# Patient Record
Sex: FEMALE | Race: WHITE | NOT HISPANIC OR LATINO | Employment: FULL TIME | URBAN - METROPOLITAN AREA
[De-identification: names, ages, dates, MRNs, and addresses within clinical notes are randomized per-mention and may not be internally consistent; named-entity substitution may affect disease eponyms.]

---

## 2019-03-15 PROCEDURE — 86481 TB AG RESPONSE T-CELL SUSP: CPT

## 2019-03-16 ENCOUNTER — LAB REQUISITION (OUTPATIENT)
Dept: LAB | Facility: HOSPITAL | Age: 54
End: 2019-03-16

## 2019-03-16 DIAGNOSIS — Z00.00 ROUTINE GENERAL MEDICAL EXAMINATION AT A HEALTH CARE FACILITY: ICD-10-CM

## 2019-03-17 LAB
TSPOT INTERPRETATION: NEGATIVE
TSPOT NIL CONTROL INTERPRETATION: NORMAL
TSPOT PANEL A: 0
TSPOT PANEL B: 0
TSPOT POS CONTROL INTERPRETATION: NORMAL

## 2019-05-08 ENCOUNTER — OFFICE VISIT (OUTPATIENT)
Dept: ORTHOPEDIC SURGERY | Facility: CLINIC | Age: 54
End: 2019-05-08

## 2019-05-08 VITALS — HEIGHT: 70 IN | BODY MASS INDEX: 41.95 KG/M2 | OXYGEN SATURATION: 98 % | WEIGHT: 293 LBS | HEART RATE: 121 BPM

## 2019-05-08 DIAGNOSIS — Z02.6 ENCOUNTER RELATED TO WORKER'S COMPENSATION CLAIM: ICD-10-CM

## 2019-05-08 DIAGNOSIS — S83.91XA SPRAIN OF RIGHT KNEE, UNSPECIFIED LIGAMENT, INITIAL ENCOUNTER: ICD-10-CM

## 2019-05-08 DIAGNOSIS — M25.531 RIGHT WRIST PAIN: Primary | ICD-10-CM

## 2019-05-08 PROCEDURE — 99204 OFFICE O/P NEW MOD 45 MIN: CPT | Performed by: ORTHOPAEDIC SURGERY

## 2019-05-08 NOTE — PROGRESS NOTES
[facility] – [place], [place] Orthopaedic Surgery Clinic Note    Subjective     Chief Complaint   Patient presents with   • Right Wrist - Pain        HPI      Estefani Blancas is a 53 y.o. female.  She fell at work injuring her right knee and right wrist about 4 weeks ago.  She had x-rays .  She is been in a brace.  Pain is 4 out of 10 aching and she is slowly getting better.        Past Medical History:   Diagnosis Date   • Diabetes mellitus (CMS/HCC)    • Hyperlipidemia    • Hypertension       Past Surgical History:   Procedure Laterality Date   • ADENOIDECTOMY     •  SECTION     • TONSILLECTOMY        History reviewed. No pertinent family history.  Social History     Socioeconomic History   • Marital status:      Spouse name: Not on file   • Number of children: Not on file   • Years of education: Not on file   • Highest education level: Not on file   Tobacco Use   • Smoking status: Never Smoker   • Smokeless tobacco: Never Used   Substance and Sexual Activity   • Alcohol use: No     Frequency: Never   • Drug use: Defer   • Sexual activity: Defer      Current Outpatient Medications on File Prior to Visit   Medication Sig Dispense Refill   • ACETAMINOPHEN 8 HOUR PO Take  by mouth.     • AMLODIPINE BESYLATE PO Take  by mouth.     • Empagliflozin (JARDIANCE PO) Take  by mouth.     • GLIPIZIDE PO Take  by mouth.     • HYDROXYZINE HCL PO Take  by mouth.     • LOSARTAN POTASSIUM PO Take  by mouth.     • Metoprolol-Hydrochlorothiazide (METOPROLOL-HCTZ ER PO) Take  by mouth.     • Misc Natural Products (ESTROVEN ENERGY) tablet Take  by mouth.     • naproxen (NAPROSYN) 375 MG tablet Take 1 tablet by mouth 2 (Two) Times a Day With Meals. 20 tablet 0   • PARoxetine HCl (PAXIL PO) Take  by mouth.       No current facility-administered medications on file prior to visit.       Allergies   Allergen Reactions   • Latex Hives        The following portions of the patient's history were reviewed and updated as appropriate:  "allergies, current medications, past family history, past medical history, past social history, past surgical history and problem list.    Review of Systems   Constitutional: Negative.    HENT: Positive for sinus pressure and sneezing.    Eyes: Negative.    Respiratory: Positive for apnea.    Cardiovascular: Negative.    Gastrointestinal: Negative.    Endocrine: Negative.    Genitourinary: Negative.    Musculoskeletal: Positive for arthralgias and joint swelling.   Skin: Negative.    Allergic/Immunologic: Positive for environmental allergies.   Neurological: Negative.    Hematological: Negative.    Psychiatric/Behavioral: Negative.         Objective      Physical Exam  Pulse (!) 121   Ht 177 cm (69.69\")   Wt (!) 148 kg (326 lb 8 oz)   SpO2 98%   Breastfeeding? No   BMI 47.27 kg/m²     Body mass index is 47.27 kg/m².        GENERAL APPEARANCE: awake, alert & oriented x 3, in no acute distress and well developed, well nourished  PSYCH: normal mood and affect  LUNGS:  breathing nonlabored, no wheezing  EYES: sclera anicteric, pupils equal  CARDIOVASCULAR: palpable pulses dorsalis pedis, palpable posterior tibial bilaterally. Capillary refill less than 2 seconds  INTEGUMENTARY: skin intact, no clubbing, cyanosis  NEUROLOGIC:  Normal Sensation and reflexes             Ortho Exam  Peripheral Vascular   Left Upper Extremity    No cyanotic nail beds    Pink nail beds and rapid capillary refill   Palpation    Radial Pulse - Bilaterally normal    Neurologic   Sensory: Light touch intact- Right and left hand    Left Upper Extremity    Left wrist extensors: 5/5    Left wrist flexors: 5/5    Left intrinsics: 5/5   Right Upper Extremity    Right wrist extensors: 5/5    Right wrist flexors: 5/5    Right intrinsics: 5/5    Musculoskeletal   Left Elbow    Forearm supination: AROM - 90 degrees    Forearm pronation: AROM - 90 degrees   Right Elbow    Forearm supination: AROM - 90 degrees    Forearm pronation: AROM - 90 " degrees     Inspection and Palpation   Right Wrist      Tenderness -tender snuffbox right wrist    Swelling - none    Crepitus - none    Muscle tone - no atrophy   Left Wrist    Tenderness - none    Swelling - none    Crepitus - none    Muscle tone - no atrophy     ROJM:   Left Wrist    Flexion: AROM - 90 degrees    Extension: AROM - 90 degrees   Right Wrist    Flexion: AROM - 90 degrees    Extension: AROM - 90 degrees     Deformities, Malalignments, Discrepancies    None     Functional Testing   Right Wrist    Tinel's Sign negative    Phalen's Sign negative    Carpal Compression Test negative   Left Wrist    Tinel's Sign negative    Phalen's Sign negative    Carpal Compression Test negative       Strength and Tone    Right  strength: good    Left  strength: good      Peripheral Vascular:    Upper Extremity:   Inspection:  Left--no cyanotic nail beds Right--no cyanotic nail beds   Bilateral:  Pink nail beds with brisk capillary refill   Palpation:  Bilateral radial pulse normal  Musculoskeletal:  Global Assessment:  Overall assessment of Lower Extremity Muscle Strength and Tone:  Right quadriceps--5/5  Right hamstrings--5/5  Right tibialis anterior--5/5  Right gastroc soleus--5/5  Right EHL--5/5  Lower Extremity:  Knee/Patella:  No digital clubbing or cyanosis.    Examination of right knee reveals:  Normal deep tendon reflexes, coordination, strength, tone, sensation.  No known fractures or deformities.  Inspection and Palpation:    Right knee:  Tenderness: Tibial tubercle  Effusion:  none  Crepitus:  none  Pulses:  2+  Ecchymosis:  None  Warmth:  None   ROM:  Right:  Extension:0    Flexion:135  Left:  Extension:0     Flexion:135  Instability:  Right:  Lachman Test:  Negative, Varus stress test negative,   Valgus stress test negative, Posterior Drawer Test:  Negative  Deformities/Malalignments/Discrepancies:    Left:  none  Right:  none  Functional Testing:  Right:  Briana's test:  Negative  Patella grind  test:  Negative  Q-angle:  Normal  Apprehension Sign:  Negative        Imaging/Studies  Imaging Results (last 7 days)     ** No results found for the last 168 hours. **        I viewed her x-rays from April 19 which showed questionable lucency scaphoid and her knee x-rays show the metal screws with no acute abnormality  Assessment/Plan        ICD-10-CM ICD-9-CM   1. Right wrist pain M25.531 719.43   2. Sprain of right knee, unspecified ligament, initial encounter S83.91XA 844.9   3. Encounter related to worker's compensation claim [email or web address] V70.3       Orders Placed This Encounter   Procedures   • MRI Wrist Right Without Contrast   • Ambulatory Referral to Physical Therapy      She will start physical therapy for the knee.  I have ordered an MRI of the right wrist.  She will follow-up after the MRI.  She will continue thumb spica brace in the meantime for the right wrist for possible scaphoid fracture.  Her work restrictions are seated job only with no use right hand.  Medical Decision Making  Management Options : over-the-counter medicine and close treatment of fracture or dislocation  Data/Risk: radiology tests and independent visualization of imaging, lab tests, or EMG/NCV    Bryon Mckeon MD  05/08/19  2:30 PM         EMR Dragon/Transcription disclaimer:  Much of this encounter note is an electronic transcription of spoken language to printed text. Electronic transcription of spoken language may permit erroneous, or at times, nonsensical words or phrases to be inadvertently transcribed. Although I have reviewed the note for such errors, some may still exist.

## 2019-05-16 ENCOUNTER — HOSPITAL ENCOUNTER (OUTPATIENT)
Dept: PHYSICAL THERAPY | Facility: HOSPITAL | Age: 54
Setting detail: THERAPIES SERIES
Discharge: HOME | End: 2019-05-16

## 2019-05-16 DIAGNOSIS — M25.561 ACUTE PAIN OF RIGHT KNEE: Primary | ICD-10-CM

## 2019-05-16 PROCEDURE — 97161 PT EVAL LOW COMPLEX 20 MIN: CPT | Performed by: PHYSICAL THERAPIST

## 2019-05-16 NOTE — THERAPY EVALUATION
Outpatient Physical Therapy Ortho Initial Evaluation  [facility]     Patient Name: Estefani Blancas  : 1965  MRN: [id number]  Today's Date: 2019      Visit Date: 2019    There is no problem list on file for this patient.       Past Medical History:   Diagnosis Date   • Diabetes mellitus (CMS/[facility])    • Hyperlipidemia    • Hypertension         Past Surgical History:   Procedure Laterality Date   • ADENOIDECTOMY     •  SECTION     • TONSILLECTOMY         Visit Dx:     ICD-10-CM ICD-9-CM   1. Acute pain of right knee M25.561 719.46         Patient History     Row Name 19 0800             History    Chief Complaint  Difficulty Walking;Difficulty with daily activities;Joint swelling;Pain  -RB      Type of Pain  Knee pain  -RB      Date Current Problem(s) Began  19  -RB      Brief Description of Current Complaint  , fell while working. 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 Landed on R knee. Developed swelling, anterior and medial knee pn. XR unremarkable. Continues to have burning pn, swelling w/ prolonged activity. Denies popping, clicking, instability. Able to tolerate about 30 minutes of standing before needing a rest break.  -RB      Previous treatment for THIS PROBLEM  -- none  -RB      Patient/Caregiver Goals  Relieve pain;Return to prior level of function  -RB      Current Tobacco Use  None  -RB      Patient's Rating of General Health  Very good  -RB      Occupation/sports/leisure activities   at [employer]  -RB      How has patient tried to help current problem?  rest, patellar stabilizing brace  -RB      What clinical tests have you had for this problem?  X-ray  -RB      Results of Clinical Tests  mod degenerative changes, no acute injury  -RB         Pain     Pain Location  Knee  -RB      Pain at Present  3  -RB      Pain at Best  0  -RB      Pain at Worst  6  -RB      Pain  Frequency  Intermittent  -RB      Pain Description  Burning  -RB      What Performance Factors Make the Current Problem(s) WORSE?  standing, walking, kneeling, pressure against the knee.  -RB      Tolerance Time- Standing  30 min  -RB      Tolerance Time- Walking  30 min  -RB      Is your sleep disturbed?  No  -RB         Fall Risk Assessment    Any falls in the past year:  Yes  -RB      Number of falls reported in the last 12 months  1  -RB      Factors that contributed to the fall:  Tripped  -RB      Does patient have a fear of falling  No  -RB         Daily Activities    Primary Language  English  -RB      Are you able to read  Yes  -RB      Are you able to write  Yes  -RB      How does patient learn best?  Listening;Demonstration  -RB      Teaching needs identified  Home Exercise Program;Management of Condition  -RB      Patient is concerned about/has problems with  Climbing Stairs;Performing home management (household chores, shopping, care of dependents);Performing job responsibilities/community activities (work, school,;Standing;Transfers (getting out of a chair, bed);Walking  -RB      Does patient have problems with the following?  Anxiety;Panic Attack  -RB      Barriers to learning  None  -RB      Pt Participated in POC and Goals  Yes  -RB         Safety    Are you being hurt, hit, or frightened by anyone at home or in your life?  No  -RB      Are you being neglected by a caregiver  No  -RB        User Key  (r) = Recorded By, (t) = Taken By, (c) = Cosigned By    Initials Name Provider Type    RB Ana Rosa Lennon PT Physical Therapist          PT Ortho     Row Name 05/16/19 0700       Posture/Observations    Posture/Observations Comments  Presents ambulating independently w/ patellar stabilizing brace donned. Demo mild antalgic gait. Noted to have infrapatellar edema. Pt withdraws w/ superficial palpation of infrapatellar region and medial joint line  -RB       Special Tests/Palpation    Special  Tests/Palpation  Knee  -RB       Knee Palpation    Knee Palpation?  Yes  -RB    Patella  Right:;Tender  -RB    Patella Tendon  Right:;Tender  -RB    Medial Joint Line  Right:;Tender  -RB       Knee Special Tests    Anterior drawer (ACL lesion)  Negative  -RB    Posterior drawer (PCL lesion)  Negative  -RB    Valgus stress (MCL lesion)  Right: significant pn medial joint line  -RB    Varus stress (LCL lesion)  Negative  -RB    Thessaly test (meniscal lesion)  Negative  -RB       General ROM    RT Lower Ext  Rt Knee Extension/Flexion  -RB    LT Lower Ext  Lt Knee Extension/Flexion  -RB       Right Lower Ext    Rt Knee Extension/Flexion AROM  0-86 deg pn both end ranges  -RB       Left Lower Ext    Lt Knee Extension/Flexion AROM  0-125  -RB       MMT (Manual Muscle Testing)    Rt Lower Ext  Rt Hip Flexion;Rt Hip Extension;Rt Hip ABduction;Rt Hip External (Lateral) Rotation;Rt Knee Extension;Rt Knee Flexion  -RB    Lt Lower Ext  Lt Hip Flexion;Lt Hip Extension;Lt Hip ABduction;Lt Hip External (Lateral) Rotation;Lt Knee Extension;Lt Knee Flexion  -RB       MMT Right Lower Ext    Rt Hip Flexion MMT, Gross Movement  (5/5) normal  -RB    Rt Hip Extension MMT, Gross Movement  (4/5) good unable to tolerate prone, testing in supine  -RB    Rt Hip ABduction MMT, Gross Movement  (4/5) good  -RB    Rt Hip External (Lateral) Rotation MMT, Gross Movement  (4+/5) good plus  -RB    Rt Knee Extension MMT, Gross Movement  (5/5) normal  -RB    Rt Knee Flexion MMT, Gross Movement  (5/5) normal  -RB       MMT Left Lower Ext    Lt Hip Flexion MMT, Gross Movement  (5/5) normal  -RB    Lt Hip Extension MMT, Gross Movement  (4+/5) good plus  -RB    Lt Hip ABduction MMT, Gross Movement  (4+/5) good plus  -RB    Lt Hip External (Lateral) Rotation MMT, Gross Movement  (5/5) normal  -RB    Lt Knee Extension MMT, Gross Movement  (5/5) normal  -RB    Lt Knee Flexion MMT, Gross Movement  (5/5) normal  -RB      User Key  (r) = Recorded By, (t) =  Taken By, (c) = Cosigned By    Initials Name Provider Type    RB Ana Rosa Lennon PT Physical Therapist                      Therapy Education  Education Details: issued initial HEP including QS, SAQ, SLR, and seated heel slides. Educated regarding use of ice for pn/edema control  Given: HEP  Program: New  How Provided: Verbal, Demonstration, Written  Provided to: Patient  Level of Understanding: Teach back education performed     PT OP Goals     Row Name 05/16/19 0945 05/16/19 0800       PT Short Term Goals    STG Date to Achieve  --  06/06/19  -RB    STG 1  --  Pt to be compliant w/ initial HEP and pn/edema mgmt  -RB    STG 1 Progress  --  New  -RB    STG 2  --  Pt to report at least a 30% improvement in pn.  -RB    STG 2 Progress  --  New  -RB    STG 3  --  Pt to improve R knee flxn ROM to 100 deg  -RB    STG 3 Progress  --  New  -RB       Long Term Goals    LTG Date to Achieve  --  06/27/19  -RB    LTG 1  --  Pt to be independent w/ long term HEP and self mgmt  -RB    LTG 1 Progress  --  New  -RB    LTG 2  --  Pt to report at least a 60% improvement in pn.  -RB    LTG 2 Progress  --  New  -RB    LTG 3  --  Pt to improve R knee flxn ROM to 115 deg  -RB    LTG 3 Progress  --  New  -RB    LTG 4  --  Pt to improve LEFS score to 30/80 or better to reflect improved pn and function.  -RB    LTG 4 Progress  --  New  -RB       Time Calculation    PT Goal Re-Cert Due Date  08/14/19  -RB  08/14/19  -RB      User Key  (r) = Recorded By, (t) = Taken By, (c) = Cosigned By    Initials Name Provider Type    Ana Rosa Hdz PT Physical Therapist          PT Assessment/Plan     Row Name 05/16/19 0941          PT Assessment    Functional Limitations  Impaired gait;Impaired locomotion;Limitation in home management;Limitations in community activities;Limitations in functional capacity and performance;Performance in leisure activities;Performance in self-care ADL;Performance in work activities  -RB     Impairments  Muscle  strength;Range of motion;Pain;Edema  -RB     Assessment Comments  Pt presents w/ complaint of knee of R knee after fall at work w/ direct impact to anterior knee. XR unremarkable and knee stable to ligament testing, though medial knee pn reproduced upon valgus testing. Pt also noted to have infrapatellar edema possibly indicating bursitis due to impact. She demonstrates impairments in knee strength and mobility, which limits her ability to tolerate standing, walking, and performing daily and work activities. She would benefit from skilled PT services to address deficits and maximize function.  -RB     Please refer to paper survey for additional self-reported information  Yes  -RB     Rehab Potential  Good  -RB     Patient/caregiver participated in establishment of treatment plan and goals  Yes  -RB     Patient would benefit from skilled therapy intervention  Yes  -RB        PT Plan    PT Frequency  1x/week;2x/week  -RB     Predicted Duration of Therapy Intervention (Therapy Eval)  10-12 visits  -RB     Planned CPT's?  PT EVAL LOW COMPLEXITY: 29338;PT RE-EVAL: 97208;PT THER PROC EA 15 MIN: 03822;PT MANUAL THERAPY EA 15 MIN: 87045;PT NEUROMUSC RE-EDUCATION EA 15 MIN: 66321;PT GAIT TRAINING EA 15 MIN: 45992;PT ELECTRICAL STIM UNATTEND: ;PT ULTRASOUND EA 15 MIN: 13439;PT HOT/COLD PACK WC NONMCARE: 58380  -RB     PT Plan Comments  PT POC to include progressive hip/knee strengthening, ROM activities, manual therapy techniques, modalities as indicated for pn/edema control  -RB       User Key  (r) = Recorded By, (t) = Taken By, (c) = Cosigned By    Initials Name Provider Type    Ana Rosa Hdz PT Physical Therapist                              Outcome Measure Options: Lower Extremity Functional Scale (LEFS)  Lower Extremity Functional Index  Any of your usual work, housework or school activities: Moderate difficulty  Your usual hobbies, recreational or sporting activities: Extreme difficulty or unable to perform  activity  Getting into or out of the bath: Moderate difficulty  Walking between rooms: Moderate difficulty  Putting on your shoes or socks: A little bit of difficulty  Squatting: Extreme difficulty or unable to perform activity  Lifting an object, like a bag of groceries from the floor: Moderate difficulty  Performing light activities around your home: Moderate difficulty  Performing heavy activities around your home: Extreme difficulty or unable to perform activity  Getting into or out of a car: Quite a bit of difficulty  Walking 2 blocks: Extreme difficulty or unable to perform activity  Walking a mile: Extreme difficulty or unable to perform activity  Going up or down 10 stairs (about 1 flight of stairs): Extreme difficulty or unable to perform activity  Standing for 1 hour: Quite a bit of difficulty  Sitting for 1 hour: Quite a bit of difficulty  Running on even ground: Extreme difficulty or unable to perform activity  Running on uneven ground: Extreme difficulty or unable to perform activity  Making sharp turns while running fast: Extreme difficulty or unable to perform activity  Hopping: Extreme difficulty or unable to perform activity  Rolling over in bed: Moderate difficulty  Total: 18      Time Calculation:     Start Time: 0800     Therapy Charges for Today     Code Description Service Date Service Provider Modifiers Qty    34920530800 HC PT EVAL LOW COMPLEXITY 3 5/16/2019 Ana Rosa Lennon, PT GP 1          PT G-Codes  Outcome Measure Options: Lower Extremity Functional Scale (LEFS)  Total: 18         Ana Rosa Lennon, PT  5/16/2019

## 2019-05-17 ENCOUNTER — HOSPITAL ENCOUNTER (OUTPATIENT)
Dept: MRI IMAGING | Facility: HOSPITAL | Age: 54
Discharge: HOME | End: 2019-05-17
Admitting: ORTHOPAEDIC SURGERY

## 2019-05-17 DIAGNOSIS — M25.531 RIGHT WRIST PAIN: ICD-10-CM

## 2019-05-17 PROCEDURE — 73221 MRI JOINT UPR EXTREM W/O DYE: CPT

## 2019-05-21 ENCOUNTER — APPOINTMENT (OUTPATIENT)
Dept: PHYSICAL THERAPY | Facility: HOSPITAL | Age: 54
End: 2019-05-21

## 2019-05-23 ENCOUNTER — HOSPITAL ENCOUNTER (OUTPATIENT)
Dept: PHYSICAL THERAPY | Facility: HOSPITAL | Age: 54
Setting detail: THERAPIES SERIES
Discharge: HOME | End: 2019-05-23

## 2019-05-23 DIAGNOSIS — M25.561 ACUTE PAIN OF RIGHT KNEE: Primary | ICD-10-CM

## 2019-05-23 PROCEDURE — 97110 THERAPEUTIC EXERCISES: CPT | Performed by: PHYSICAL THERAPIST

## 2019-05-23 NOTE — THERAPY TREATMENT NOTE
Outpatient Physical Therapy Ortho Treatment Note  [facility]     Patient Name: Estefani Blancas  : 1965  MRN: [id number]  Today's Date: 2019      Visit Date: 2019    Visit Dx:    ICD-10-CM ICD-9-CM   1. Acute pain of right knee M25.561 719.46       There is no problem list on file for this patient.       Past Medical History:   Diagnosis Date   • Diabetes mellitus (CMS/HCC)    • Hyperlipidemia    • Hypertension         Past Surgical History:   Procedure Laterality Date   • ADENOIDECTOMY     •  SECTION     • TONSILLECTOMY         PT Ortho     Row Name 19 1100       Subjective Comments    Subjective Comments  Pt. went to field day with grandchildren yesterday.  She was on her feet more than usual.  This morning, her knee pain is mild.  -GEORGE       Subjective Pain    Able to rate subjective pain?  yes  -GEORGE    Pre-Treatment Pain Level  2  -GEORGE    Post-Treatment Pain Level  2  -GEORGE       Right Lower Ext    Rt Knee Extension/Flexion AROM  0-124  -GEORGE      User Key  (r) = Recorded By, (t) = Taken By, (c) = Cosigned By    Initials Name Provider Type    Nicole Li PT Physical Therapist                      PT Assessment/Plan     Row Name 19 1100          PT Assessment    Assessment Comments  Knee ROM is significantly improved since initial visit.  Knee remains tender and swollen, but function is improving and pain is decreasing.  -GEORGE        PT Plan    PT Plan Comments  Continue and progress as tolerated.  Pt. will see MD tomorrow.  -GEORGE       User Key  (r) = Recorded By, (t) = Taken By, (c) = Cosigned By    Initials Name Provider Type    Nicole Li PT Physical Therapist            Exercises     Row Name 19 1100             Subjective Comments    Subjective Comments  Pt. went to field day with grandchildren yesterday.  She was on her feet more than usual.  This morning, her knee pain is mild.  -GEORGE         Subjective Pain    Able to rate subjective pain?   yes  -GEORGE      Pre-Treatment Pain Level  2  -GEORGE      Post-Treatment Pain Level  2  -GEORGE         Total Minutes    44672 - PT Therapeutic Exercise Minutes  30  -GEORGE         Exercise 1    Exercise Name 1  Inititated exercise in clinical setting per flow sheet, including open and closed chain strengthening exercises and introduction of proprioceptive activities on soft surface.  -GEORGE        User Key  (r) = Recorded By, (t) = Taken By, (c) = Cosigned By    Initials Name Provider Type    Nicole Li, PT Physical Therapist                                          Time Calculation:   Start Time: 0915  Total Timed Code Minutes- PT: 30 minute(s)  Therapy Charges for Today     Code Description Service Date Service Provider Modifiers Qty    [id number]  PT THER PROC EA 15 MIN 5/23/2019 Nicole Baca, PT GP 2                    Nicole Baca, RACHEL  5/23/2019

## 2019-05-24 ENCOUNTER — OFFICE VISIT (OUTPATIENT)
Dept: ORTHOPEDIC SURGERY | Facility: CLINIC | Age: 54
End: 2019-05-24

## 2019-05-24 VITALS — OXYGEN SATURATION: 99 % | HEIGHT: 70 IN | WEIGHT: 293 LBS | BODY MASS INDEX: 41.95 KG/M2 | HEART RATE: 98 BPM

## 2019-05-24 DIAGNOSIS — Z02.6 ENCOUNTER RELATED TO WORKER'S COMPENSATION CLAIM: ICD-10-CM

## 2019-05-24 DIAGNOSIS — M25.531 RIGHT WRIST PAIN: Primary | ICD-10-CM

## 2019-05-24 DIAGNOSIS — S83.91XA SPRAIN OF RIGHT KNEE, UNSPECIFIED LIGAMENT, INITIAL ENCOUNTER: ICD-10-CM

## 2019-05-24 PROCEDURE — 99213 OFFICE O/P EST LOW 20 MIN: CPT | Performed by: ORTHOPAEDIC SURGERY

## 2019-05-24 NOTE — PROGRESS NOTES
[facility]    Subjective     Chief Complaint   Patient presents with   • Follow-up     Post MRI -Right wrist pain        HPI      Estefani Blancas is a 53 y.o. female.  She is follow-up after the MRI of the right wrist on May 17.  She is doing better in her knee and wrist.  She goes to physical therapy        Past Medical History:   Diagnosis Date   • Diabetes mellitus (CMS/HCC)    • Hyperlipidemia    • Hypertension       Past Surgical History:   Procedure Laterality Date   • ADENOIDECTOMY     •  SECTION     • TONSILLECTOMY        History reviewed. No pertinent family history.  Social History     Socioeconomic History   • Marital status:      Spouse name: Not on file   • Number of children: Not on file   • Years of education: Not on file   • Highest education level: Not on file   Tobacco Use   • Smoking status: Never Smoker   • Smokeless tobacco: Never Used   Substance and Sexual Activity   • Alcohol use: No     Frequency: Never   • Drug use: Defer   • Sexual activity: Defer      Current Outpatient Medications on File Prior to Visit   Medication Sig Dispense Refill   • ACETAMINOPHEN 8 HOUR PO Take  by mouth.     • AMLODIPINE BESYLATE PO Take  by mouth.     • Empagliflozin (JARDIANCE PO) Take  by mouth.     • GLIPIZIDE PO Take  by mouth.     • HYDROXYZINE HCL PO Take  by mouth.     • LOSARTAN POTASSIUM PO Take  by mouth.     • Metoprolol-Hydrochlorothiazide (METOPROLOL-HCTZ ER PO) Take  by mouth.     • Misc Natural Products (ESTROVEN ENERGY) tablet Take  by mouth.     • naproxen (NAPROSYN) 375 MG tablet Take 1 tablet by mouth 2 (Two) Times a Day With Meals. 20 tablet 0   • PARoxetine HCl (PAXIL PO) Take  by mouth.       No current facility-administered medications on file prior to visit.       Allergies   Allergen Reactions   • Latex Hives        The following portions of the patient's history were reviewed and updated as appropriate: allergies, current medications, past  "family history, past medical history, past social history, past surgical history and problem list.    Review of Systems   Constitutional: Negative.    HENT: Positive for sinus pressure and sneezing.    Eyes: Negative.    Respiratory: Positive for cough.    Cardiovascular: Negative.    Gastrointestinal: Negative.    Endocrine: Negative.    Genitourinary: Negative.    Musculoskeletal: Positive for joint swelling.   Skin: Negative.    Allergic/Immunologic: Negative.    Neurological: Negative.    Hematological: Negative.    Psychiatric/Behavioral: Negative.         Objective      Physical Exam  Pulse 98   Ht 177 cm (69.69\")   Wt (!) 148 kg (326 lb 4.5 oz)   SpO2 99%   Breastfeeding? No   BMI 47.24 kg/m²     Body mass index is 47.24 kg/m².        GENERAL APPEARANCE: awake, alert & oriented x 3, in no acute distress and well developed, well nourished  PSYCH: normal mood and affect      Ortho Exam  Peripheral Vascular   Left Upper Extremity    No cyanotic nail beds    Pink nail beds and rapid capillary refill   Palpation    Radial Pulse - Bilaterally normal    Neurologic   Sensory: Light touch intact- Right and left hand    Left Upper Extremity    Left wrist extensors: 5/5    Left wrist flexors: 5/5    Left intrinsics: 5/5   Right Upper Extremity    Right wrist extensors: 5/5    Right wrist flexors: 5/5    Right intrinsics: 5/5    Musculoskeletal   Left Elbow    Forearm supination: AROM - 90 degrees    Forearm pronation: AROM - 90 degrees   Right Elbow    Forearm supination: AROM - 90 degrees    Forearm pronation: AROM - 90 degrees     Inspection and Palpation   Right Wrist      Tenderness - none    Swelling - none    Crepitus - none    Muscle tone - no atrophy   Left Wrist    Tenderness - none    Swelling - none    Crepitus - none    Muscle tone - no atrophy     ROJM:   Left Wrist    Flexion: AROM - 90 degrees    Extension: AROM - 90 degrees   Right Wrist    Flexion: AROM - 90 degrees    Extension: AROM - 90 " degrees     Deformities, Malalignments, Discrepancies    None     Functional Testing   Right Wrist    Tinel's Sign negative    Phalen's Sign negative    Carpal Compression Test negative   Left Wrist    Tinel's Sign negative    Phalen's Sign negative    Carpal Compression Test negative       Strength and Tone    Right  strength: good    Left  strength: good          Imaging/Studies  Imaging Results (last 7 days)     ** No results found for the last 168 hours. **      I viewed her MRI from May 17 which showed no acute fracture or tendon or ligament injury.    Assessment/Plan        ICD-10-CM ICD-9-CM   1. Right wrist pain M25.531 719.43   2. Encounter related to worker's compensation claim Z02.6 V70.3   3. Sprain of right knee, unspecified ligament, initial encounter S83.91XA 844.9       Orders Placed This Encounter   Procedures   • Ambulatory Referral to Physical Therapy      To continue physical therapy.  She will follow-up in 2 weeks.  Her restrictions are seated job only no use right hand.  She may discontinue the brace.  She is here with the .  I reviewed her notes from physical therapy dated yesterday which state her range of motion 0- 124 degrees and she is making good improvement with her right knee.   Medical Decision Making  Management Options : over-the-counter medicine and physical/occupational therapy  Data/Risk: radiology tests and independent visualization of imaging, lab tests, or EMG/NCV    Bryon Mckeon MD  05/24/19  10:17 AM         EMR Dragon/Transcription disclaimer:  Much of this encounter note is an electronic transcription of spoken language to printed text. Electronic transcription of spoken language may permit erroneous, or at times, nonsensical words or phrases to be inadvertently transcribed. Although I have reviewed the note for such errors, some may still exist.

## 2019-05-31 ENCOUNTER — HOSPITAL ENCOUNTER (OUTPATIENT)
Dept: PHYSICAL THERAPY | Facility: HOSPITAL | Age: 54
Setting detail: THERAPIES SERIES
Discharge: HOME | End: 2019-05-31

## 2019-05-31 DIAGNOSIS — M25.531 RIGHT WRIST PAIN: Primary | ICD-10-CM

## 2019-05-31 PROCEDURE — 97110 THERAPEUTIC EXERCISES: CPT | Performed by: PHYSICAL THERAPIST

## 2019-06-03 ENCOUNTER — HOSPITAL ENCOUNTER (OUTPATIENT)
Dept: PHYSICAL THERAPY | Facility: HOSPITAL | Age: 54
Setting detail: THERAPIES SERIES
Discharge: HOME | End: 2019-06-03

## 2019-06-03 DIAGNOSIS — M25.531 RIGHT WRIST PAIN: Primary | ICD-10-CM

## 2019-06-03 DIAGNOSIS — M25.561 ACUTE PAIN OF RIGHT KNEE: ICD-10-CM

## 2019-06-03 PROCEDURE — 97140 MANUAL THERAPY 1/> REGIONS: CPT | Performed by: PHYSICAL THERAPIST

## 2019-06-03 PROCEDURE — 97110 THERAPEUTIC EXERCISES: CPT | Performed by: PHYSICAL THERAPIST

## 2019-06-03 NOTE — THERAPY TREATMENT NOTE
Outpatient Physical Therapy Ortho Treatment Note  [facility]     Patient Name: Estefani Blancas  : 1965  MRN: [id number]  Today's Date: 6/3/2019      Visit Date: 2019    Visit Dx:    ICD-10-CM ICD-9-CM   1. Right wrist pain M25.531 719.43   2. Acute pain of right knee M25.561 719.46       There is no problem list on file for this patient.       Past Medical History:   Diagnosis Date   • Diabetes mellitus (CMS/HCC)    • Hyperlipidemia    • Hypertension         Past Surgical History:   Procedure Laterality Date   • ADENOIDECTOMY     •  SECTION     • TONSILLECTOMY         PT Ortho     Row Name 19 1000       Subjective Pain    Able to rate subjective pain?  yes  -GEORGE    Pre-Treatment Pain Level  2  -GEORGE    Post-Treatment Pain Level  2  -GEORGE      User Key  (r) = Recorded By, (t) = Taken By, (c) = Cosigned By    Initials Name Provider Type    Nicole Li, PT Physical Therapist                      PT Assessment/Plan     Row Name 19 1000          PT Assessment    Assessment Comments  Pt. needs ongoing functional strengthening of R knee to maximize ability to negotiate stairs, get in/out of car.  She also needs progressive strengthening of R wrist and hand strength and endurance.  Etiology of numbness is unknown at this point.  -GEORGE        PT Plan    PT Plan Comments  Continue and progress as tolerated.  -GEORGE       User Key  (r) = Recorded By, (t) = Taken By, (c) = Cosigned By    Initials Name Provider Type    Nicole Li, PT Physical Therapist            Exercises     Row Name 19 1000 19 0900          Subjective Comments    Subjective Comments  Pt. reports mild R knee and R wrist pain.  She continues to report numbness is R thenar eminence and thumb.  She says the hand/putty exercises are difficult but doable.  -GEORGE  --        Subjective Pain    Able to rate subjective pain?  yes  -GEORGE  --     Pre-Treatment Pain Level  2  -GEORGE  --     Post-Treatment Pain  Level  2  -GEORGE  --        Total Minutes    91412 - PT Therapeutic Exercise Minutes  35  -GEORGE  --     38722 - PT Manual Therapy Minutes  --  10  -GEORGE        Exercise 1    Exercise Name 1  Exercise in clinical setting per flow sheet, addressing R knee ROM and functional strengthening, as well as R wrist exercises emphasizing wrist flexion and thumb ROM today.  -GEORGE  --       User Key  (r) = Recorded By, (t) = Taken By, (c) = Cosigned By    Initials Name Provider Type    Nicole Li, PT Physical Therapist                      Manual Rx (last 36 hours)      Manual Treatments     Row Name 06/03/19 0900             Total Minutes    68231 - PT Manual Therapy Minutes  10  -GEORGE         Manual Rx 1    Manual Rx 1 Location  R thenar eminence, wrist, distal forearm  -GEORGE      Manual Rx 1 Type  Astym, STM, PROM  -GEORGE        User Key  (r) = Recorded By, (t) = Taken By, (c) = Cosigned By    Initials Name Provider Type    Nicole Li PT Physical Therapist                             Time Calculation:   Start Time: 0845  Total Timed Code Minutes- PT: 45 minute(s)  Therapy Charges for Today     Code Description Service Date Service Provider Modifiers Qty    68154761021  PT THER PROC EA 15 MIN 6/3/2019 Nicole Baca PT GP 2    [id number]  PT MANUAL THERAPY EA 15 MIN 6/3/2019 Nicole Baca PT GP 1                    Nicole Baca, PT  6/3/2019

## 2019-06-07 ENCOUNTER — OFFICE VISIT (OUTPATIENT)
Dept: ORTHOPEDIC SURGERY | Facility: CLINIC | Age: 54
End: 2019-06-07

## 2019-06-07 VITALS — BODY MASS INDEX: 41.95 KG/M2 | WEIGHT: 293 LBS | HEART RATE: 92 BPM | HEIGHT: 70 IN | OXYGEN SATURATION: 97 %

## 2019-06-07 DIAGNOSIS — R20.0 NUMBNESS AND TINGLING IN RIGHT HAND: ICD-10-CM

## 2019-06-07 DIAGNOSIS — S83.91XA SPRAIN OF RIGHT KNEE, UNSPECIFIED LIGAMENT, INITIAL ENCOUNTER: ICD-10-CM

## 2019-06-07 DIAGNOSIS — Z02.6 ENCOUNTER RELATED TO WORKER'S COMPENSATION CLAIM: ICD-10-CM

## 2019-06-07 DIAGNOSIS — R20.2 NUMBNESS AND TINGLING IN RIGHT HAND: ICD-10-CM

## 2019-06-07 DIAGNOSIS — M25.531 RIGHT WRIST PAIN: Primary | ICD-10-CM

## 2019-06-07 PROCEDURE — 99213 OFFICE O/P EST LOW 20 MIN: CPT | Performed by: ORTHOPAEDIC SURGERY

## 2019-06-07 NOTE — PROGRESS NOTES
[facility] [place] [facility] Note    Subjective     Chief Complaint   Patient presents with   • Right Wrist - Follow-up     2 weeks        HPI  Estefani Blancas is a 53 y.o. female.  She had an injury at work .  She is only had one session of physical therapy on her right hand.  She has new complaint of right thumb and index finger numbness.  She did not notice that before the injury.  No neck pain.  Pain is 2 out of 10 and dull.  Her knee is getting better.    Past Medical History:   Diagnosis Date   • Diabetes mellitus (CMS/HCC)    • Hyperlipidemia    • Hypertension       Past Surgical History:   Procedure Laterality Date   • ADENOIDECTOMY     •  SECTION     • TONSILLECTOMY        History reviewed. No pertinent family history.  Social History     Socioeconomic History   • Marital status:      Spouse name: Not on file   • Number of children: Not on file   • Years of education: Not on file   • Highest education level: Not on file   Tobacco Use   • Smoking status: Never Smoker   • Smokeless tobacco: Never Used   Substance and Sexual Activity   • Alcohol use: No     Frequency: Never   • Drug use: Defer   • Sexual activity: Defer      Current Outpatient Medications on File Prior to Visit   Medication Sig Dispense Refill   • ACETAMINOPHEN 8 HOUR PO Take  by mouth.     • AMLODIPINE BESYLATE PO Take  by mouth.     • Empagliflozin (JARDIANCE PO) Take  by mouth.     • GLIPIZIDE PO Take  by mouth.     • HYDROXYZINE HCL PO Take  by mouth.     • LOSARTAN POTASSIUM PO Take  by mouth.     • Metoprolol-Hydrochlorothiazide (METOPROLOL-HCTZ ER PO) Take  by mouth.     • Misc Natural Products (ESTROVEN ENERGY) tablet Take  by mouth.     • naproxen (NAPROSYN) 375 MG tablet Take 1 tablet by mouth 2 (Two) Times a Day With Meals. 20 tablet 0   • PARoxetine HCl (PAXIL PO) Take  by mouth.       No current facility-administered medications on file prior to visit.       Allergies   Allergen Reactions   • Latex  "Hives        The following portions of the patient's history were reviewed and updated as appropriate: allergies, current medications, past family history, past medical history, past social history, past surgical history and problem list.    Review of Systems   Constitutional: Negative.    HENT: Negative.    Eyes: Negative.    Respiratory: Negative.    Cardiovascular: Negative.    Gastrointestinal: Negative.    Endocrine: Negative.    Genitourinary: Negative.    Musculoskeletal: Positive for arthralgias.   Skin: Negative.    Allergic/Immunologic: Negative.    Neurological: Negative.    Hematological: Negative.    Psychiatric/Behavioral: Negative.         Objective      Physical Exam  Pulse 92   Ht 177 cm (69.69\")   Wt (!) 148 kg (326 lb 4.5 oz)   SpO2 97%   BMI 47.24 kg/m²     Body mass index is 47.24 kg/m².    GENERAL APPEARANCE: awake, alert & oriented x 3, in no acute distress and well developed, well nourished  PSYCH: normal mood and affect    Ortho Exam  Peripheral Vascular   Left Upper Extremity    No cyanotic nail beds    Pink nail beds and rapid capillary refill   Palpation    Radial Pulse - Bilaterally normal    Neurologic   Sensory: Light touch intact- Right and left hand    Left Upper Extremity    Left wrist extensors: 5/5    Left wrist flexors: 5/5    Left intrinsics: 5/5   Right Upper Extremity    Right wrist extensors: 5/5    Right wrist flexors: 5/5    Right intrinsics: 5/5    Musculoskeletal   Left Elbow    Forearm supination: AROM - 90 degrees    Forearm pronation: AROM - 90 degrees   Right Elbow    Forearm supination: AROM - 90 degrees    Forearm pronation: AROM - 90 degrees     Inspection and Palpation   Right Wrist      Tenderness -dorsum of the hand with decreased sensation in her thumb and index finger    Swelling - none    Crepitus - none    Muscle tone - no atrophy   Left Wrist    Tenderness - none    Swelling - none    Crepitus - none    Muscle tone - no atrophy     ROJM:   Left " Wrist    Flexion: AROM - 90 degrees    Extension: AROM - 90 degrees   Right Wrist    Flexion: AROM - 90 degrees    Extension: AROM - 90 degrees     Deformities, Malalignments, Discrepancies    None     Functional Testing   Right Wrist    Tinel's Sign negative    Phalen's Sign negative    Carpal Compression Test negative   Left Wrist    Tinel's Sign negative    Phalen's Sign negative    Carpal Compression Test negative       Strength and Tone    Right  strength: Weakness of  and pinch of the index finger and thumb    Left  strength: good     Right knee has no swelling tenderness and normal gait     Imaging/Studies  Imaging Results (last 7 days)     ** No results found for the last 168 hours. **          Assessment/Plan        ICD-10-CM ICD-9-CM   1. Right wrist pain M25.531 719.43   2. Encounter related to worker's compensation claim Z02.6 V70.3   3. Sprain of right knee, unspecified ligament, initial encounter S83.91XA 844.9   4. Numbness and tingling in right hand R20.0 782.0    R20.2        Orders Placed This Encounter   Procedures   • Ambulatory Referral to Physical Therapy      She will continue physical therapy.  Anti-inflammatories and follow-up in 3 to 4 weeks.  Work restriction no use right hand and no squatting.  She is here with the .  This was discussed with .    Medical Decision Making  Management Options : over-the-counter medicine and physical/occupational therapy      Bryon cMkeon MD  06/07/19  9:40 AM         EMR Dragon/Transcription disclaimer:  Much of this encounter note is an electronic transcription of spoken language to printed text. Electronic transcription of spoken language may permit erroneous, or at times, nonsensical words or phrases to be inadvertently transcribed. Although I have reviewed the note for such errors, some may still exist.

## 2019-06-14 ENCOUNTER — HOSPITAL ENCOUNTER (OUTPATIENT)
Dept: PHYSICAL THERAPY | Facility: HOSPITAL | Age: 54
Setting detail: THERAPIES SERIES
Discharge: HOME | End: 2019-06-14

## 2019-06-14 DIAGNOSIS — M25.531 RIGHT WRIST PAIN: Primary | ICD-10-CM

## 2019-06-14 DIAGNOSIS — M25.561 ACUTE PAIN OF RIGHT KNEE: ICD-10-CM

## 2019-06-14 PROCEDURE — 97110 THERAPEUTIC EXERCISES: CPT | Performed by: PHYSICAL THERAPIST

## 2019-06-14 NOTE — THERAPY PROGRESS REPORT/RE-CERT
Outpatient Physical Therapy Ortho Progress Note  [facility]     Patient Name: Estefani Blancas  : 1965  MRN: [id number]  Today's Date: 2019      Visit Date: 2019    Visit Dx:    ICD-10-CM ICD-9-CM   1. Right wrist pain M25.531 719.43   2. Acute pain of right knee M25.561 719.46       There is no problem list on file for this patient.       Past Medical History:   Diagnosis Date   • Diabetes mellitus (CMS/HCC)    • Hyperlipidemia    • Hypertension         Past Surgical History:   Procedure Laterality Date   • ADENOIDECTOMY     •  SECTION     • TONSILLECTOMY         PT Ortho     Row Name 19 1100       Subjective Comments    Subjective Comments  Pt. saw MD.  She is to continue PT.  She is working 2 hours per day answering phones.  -GEORGE       Right Hand Strength - Pinch (lbs)    Lateral  11 lbs  -GEORGE    Tip (2 point)  10 lbs  -GEORGE    Tip (3 point)  9 lbs  -GEORGE       MMT Right Upper Ext    Rt Forearm Supination MMT, Gross Movement  (4+/5) good plus  -GEORGE    Rt Forearm Pronation MMT, Gross Movement  (5/5) normal  -GEORGE    Rt Wrist Flexion MMT, Gross Movement  (4+/5) good plus  -GEORGE    Rt Wrist Extension MMT, Gross Movement  (5/5) normal  -GEORGE       MMT Left Lower Ext    Lt Hip Extension MMT, Gross Movement  (4/5) good  -GEORGE    Lt Hip ABduction MMT, Gross Movement  (4+/5) good plus  -GEORGE        Strength Right    # Reps  3  -GEORGE    Right Rung  2  -GEORGE    Right  Test 1  35  -GEORGE    Right  Test 2  36  -GEORGE    Right  Test 3  34  -GEORGE     Strength Average Right  35  -GEORGE      User Key  (r) = Recorded By, (t) = Taken By, (c) = Cosigned By    Initials Name Provider Type    Nicole Li, PT Physical Therapist                      PT Assessment/Plan     Row Name 19 1200          PT Assessment    Assessment Comments  Pt. demonstrates improvement in R  and pinch strength.  LEFS score shows significant improvement.  Pt. will benefit from continuing PT intervention to  address remaining deficits.  -GEORGE        PT Plan    PT Plan Comments  Continue PT.  -GEORGE       User Key  (r) = Recorded By, (t) = Taken By, (c) = Cosigned By    Initials Name Provider Type    Nicole Li PT Physical Therapist            Exercises     Row Name 06/14/19 1100             Subjective Comments    Subjective Comments  Pt. saw MD.  She is to continue PT.  She is working 2 hours per day answering phones.  -GEORGE         Total Minutes    20270 - PT Therapeutic Exercise Minutes  45  -GEORGE         Exercise 1    Exercise Name 1  Reassessment completed and HEP progressed.  -GEORGE        User Key  (r) = Recorded By, (t) = Taken By, (c) = Cosigned By    Initials Name Provider Type    Nicole Li PT Physical Therapist                       PT OP Goals     Row Name 06/14/19 1200          PT Short Term Goals    STG Date to Achieve  06/06/19  -GEORGE     STG 1  Pt to be compliant w/ initial HEP and pn/edema mgmt  -GEORGE     STG 1 Progress  Met  -GEORGE     STG 2  Pt to report at least a 30% improvement in pn.  -GEORGE     STG 2 Progress  Partially Met;Ongoing  -GEORGE     STG 3  Pt to improve R knee flxn ROM to 100 deg  -GEORGE     STG 3 Progress  Met  -GEORGE     STG 3 Progress Comments  0-124  -GEORGE        Long Term Goals    LTG Date to Achieve  06/27/19  -GEORGE     LTG 1  Pt to be independent w/ long term HEP and self mgmt  -GEORGE     LTG 1 Progress  Progressing;Ongoing  -GEORGE     LTG 2  Pt to report at least a 60% improvement in pn.  -GEORGE     LTG 2 Progress  Progressing;Ongoing  -GEORGE     LTG 3  Pt to improve R knee flxn ROM to 115 deg  -GEORGE     LTG 3 Progress  Met  -GEORGE     LTG 3 Progress Comments  0-124  -GEORGE     LTG 4  Pt to improve LEFS score to 30/80 or better to reflect improved pn and function.  -GEORGE     LTG 4 Progress  Met  -GEORGE     LTG 4 Progress Comments  38/80  -GEORGE     LTG 5  Improve R hand strength to WFL's for daily tasks.  -GOERGE     LTG 5 Progress  Progressing;Ongoing  -     LTG 6  Improve R wrist ROM to WNL's.  -GEORGE     LTG 6  Progress  Progressing;Ongoing  -GEORGE       User Key  (r) = Recorded By, (t) = Taken By, (c) = Cosigned By    Initials Name Provider Type    Nicole Li, PT Physical Therapist               Outcome Measure Options: Quick DASH  Quick DASH  Open a tight or new jar.: Moderate Difficulty  Do heavy household chores (e.g., wash walls, wash floors): Severe Difficulty  Carry a shopping bag or briefcase: Severe Difficulty  Wash your back: Moderate Difficulty  Use a knife to cut food: Unable  Recreational activities in which you take some force or impact through your arm, should or hand (e.g. golf, hammering, tennis, etc.): Severe Difficulty  During the past week, to what extent has your arm, shoulder, or hand problem interfered with your normal social activites with family, friends, neighbors or groups?: Quite a bit  During the past week, were you limited in your work or other regular daily activities as a result of your arm, shoulder or hand problem?: Moderately Limited  Arm, Shoulder, or hand pain: Moderate  Tingling (pins and needles) in your arm, shoulder, or hand: Mild  During the past week, how much difficulty have you had sleeping because of the pain in your arm, shoulder or hand?: Mild Difficulty  Number of Questions Answered: 11  Quick DASH Score: 59.09  Lower Extremity Functional Index  Any of your usual work, housework or school activities: Moderate difficulty  Your usual hobbies, recreational or sporting activities: Moderate difficulty  Getting into or out of the bath: A little bit of difficulty  Walking between rooms: No difficulty  Putting on your shoes or socks: No difficulty  Squatting: Extreme difficulty or unable to perform activity  Lifting an object, like a bag of groceries from the floor: Moderate difficulty  Performing light activities around your home: No difficulty  Performing heavy activities around your home: Quite a bit of difficulty  Getting into or out of a car: Moderate  difficulty  Walking 2 blocks: Moderate difficulty  Walking a mile: Extreme difficulty or unable to perform activity  Going up or down 10 stairs (about 1 flight of stairs): Quite a bit of difficulty  Standing for 1 hour: A little bit of difficulty  Sitting for 1 hour: No difficulty  Running on even ground: Extreme difficulty or unable to perform activity  Running on uneven ground: Extreme difficulty or unable to perform activity  Making sharp turns while running fast: Extreme difficulty or unable to perform activity  Hopping: Extreme difficulty or unable to perform activity  Rolling over in bed: No difficulty  Total: 38      Time Calculation:   Start Time: 1115  Total Timed Code Minutes- PT: 45 minute(s)  Therapy Charges for Today     Code Description Service Date Service Provider Modifiers Qty    70349736729 HC PT THER PROC EA 15 MIN 6/14/2019 Nicole Baca, PT GP 3          PT G-Codes  Outcome Measure Options: Quick DASH  Quick DASH Score: 59.09  Total: 38         Nicole Baca PT  6/14/2019

## 2019-06-17 ENCOUNTER — TELEPHONE (OUTPATIENT)
Dept: ORTHOPEDIC SURGERY | Facility: CLINIC | Age: 54
End: 2019-06-17

## 2019-06-17 NOTE — TELEPHONE ENCOUNTER
Patient returned call with instructions to fax note to [phone], attn Ale Bethea. Note typed and faxed. Patient informed.

## 2019-06-17 NOTE — TELEPHONE ENCOUNTER
Patient went to [facility] yesterday and with very little walking, she is now experiencing knee pain and swelling. Her  wanted her to let Dr. Mckeon know so he could check it out at her next visit on July 5.     She would also like to know if there's anything she can do other than elevate and ice?

## 2019-06-21 ENCOUNTER — TELEPHONE (OUTPATIENT)
Dept: ORTHOPEDIC SURGERY | Facility: CLINIC | Age: 54
End: 2019-06-21

## 2019-06-21 NOTE — TELEPHONE ENCOUNTER
Patient called regarding MRI for knee. She has decided that she would like to have the MRI done. She is a workers comp patient.

## 2019-06-24 ENCOUNTER — HOSPITAL ENCOUNTER (OUTPATIENT)
Dept: PHYSICAL THERAPY | Facility: HOSPITAL | Age: 54
Setting detail: THERAPIES SERIES
Discharge: HOME | End: 2019-06-24

## 2019-06-24 DIAGNOSIS — M25.531 RIGHT WRIST PAIN: Primary | ICD-10-CM

## 2019-06-24 DIAGNOSIS — M25.561 ACUTE PAIN OF RIGHT KNEE: ICD-10-CM

## 2019-06-24 PROCEDURE — 97110 THERAPEUTIC EXERCISES: CPT | Performed by: PHYSICAL THERAPIST

## 2019-06-24 NOTE — THERAPY TREATMENT NOTE
Outpatient Physical Therapy Ortho Treatment Note  [facility]     Patient Name: Estefani Blancas  : 1965  MRN: [id number]  Today's Date: 2019      Visit Date: 2019    Visit Dx:    ICD-10-CM ICD-9-CM   1. Right wrist pain M25.531 719.43   2. Acute pain of right knee M25.561 719.46       There is no problem list on file for this patient.       Past Medical History:   Diagnosis Date   • Diabetes mellitus (CMS/HCC)    • Hyperlipidemia    • Hypertension         Past Surgical History:   Procedure Laterality Date   • ADENOIDECTOMY     •  SECTION     • TONSILLECTOMY         PT Ortho     Row Name 19 0900       Subjective Comments    Subjective Comments  Pt. is eager to get back to work.  She reports no pain upon arrival this morning.  During exercise, she indicates R quad fatigue.  -GEORGE       Subjective Pain    Able to rate subjective pain?  yes  -GEORGE    Pre-Treatment Pain Level  0  -GEORGE    Post-Treatment Pain Level  0  -GEORGE      User Key  (r) = Recorded By, (t) = Taken By, (c) = Cosigned By    Initials Name Provider Type    Nicole Li, PT Physical Therapist                      PT Assessment/Plan     Row Name 19 09          PT Assessment    Assessment Comments  Pt. was challenged by exercises, but tolerated well.  -GEORGE        PT Plan    PT Plan Comments  Continue and progress exercises in preparation for return to work.  -GEORGE       User Key  (r) = Recorded By, (t) = Taken By, (c) = Cosigned By    Initials Name Provider Type    Nicole Li, RACHEL Physical Therapist            Exercises     Row Name 19 0900             Subjective Comments    Subjective Comments  Pt. is eager to get back to work.  She reports no pain upon arrival this morning.  During exercise, she indicates R quad fatigue.  -GEORGE         Subjective Pain    Able to rate subjective pain?  yes  -GEORGE      Pre-Treatment Pain Level  0  -GEORGE      Post-Treatment Pain Level  0  -GEORGE         Total Minutes     88030 - PT Therapeutic Exercise Minutes  45  -GEORGE         Exercise 1    Exercise Name 1  Exercises in clinical setting per flow sheet, addressing LE functional strength and R hand strength.  -GEORGE        User Key  (r) = Recorded By, (t) = Taken By, (c) = Cosigned By    Initials Name Provider Type    Nicole Li, PT Physical Therapist                                          Time Calculation:   Start Time: 0845  Total Timed Code Minutes- PT: 45 minute(s)  Therapy Charges for Today     Code Description Service Date Service Provider Modifiers Qty    27164037478  PT THER PROC EA 15 MIN 6/24/2019 Nicole Baca, PT GP 3                    Nicole Baca, PT  6/24/2019

## 2019-06-27 ENCOUNTER — HOSPITAL ENCOUNTER (OUTPATIENT)
Dept: PHYSICAL THERAPY | Facility: HOSPITAL | Age: 54
Setting detail: THERAPIES SERIES
Discharge: HOME | End: 2019-06-27

## 2019-06-27 DIAGNOSIS — M25.561 ACUTE PAIN OF RIGHT KNEE: Primary | ICD-10-CM

## 2019-06-27 DIAGNOSIS — M25.531 RIGHT WRIST PAIN: ICD-10-CM

## 2019-06-27 PROCEDURE — 97110 THERAPEUTIC EXERCISES: CPT | Performed by: PHYSICAL THERAPIST

## 2019-06-27 NOTE — THERAPY TREATMENT NOTE
Outpatient Physical Therapy Ortho Treatment Note  [facility]     Patient Name: Estefani Blancas  : 1965  MRN: [id number]  Today's Date: 2019      Visit Date: 2019    Visit Dx:    ICD-10-CM ICD-9-CM   1. Acute pain of right knee M25.561 719.46   2. Right wrist pain M25.531 719.43       There is no problem list on file for this patient.       Past Medical History:   Diagnosis Date   • Diabetes mellitus (CMS/HCC)    • Hyperlipidemia    • Hypertension         Past Surgical History:   Procedure Laterality Date   • ADENOIDECTOMY     •  SECTION     • TONSILLECTOMY         PT Ortho     Row Name 19 1600       Subjective Comments    Subjective Comments  Pt. says she has to return to work as soon as possible for financial reasons.  She has no complaints about her wrist today.  Her knee is sore after being on it a little more recently.  -GEORGE       Subjective Pain    Able to rate subjective pain?  yes  -GEORGE    Pre-Treatment Pain Level  2  -GEORGE    Post-Treatment Pain Level  2  -GEORGE      User Key  (r) = Recorded By, (t) = Taken By, (c) = Cosigned By    Initials Name Provider Type    Nicole Li, PT Physical Therapist                      PT Assessment/Plan     Row Name 19 1600          PT Assessment    Assessment Comments  Pt. is determined to return to full-time work after MD follow up next week.  She has no complaints regarding her wrist today.  Her knee pain is mild and is associated with iincreased time on feet.  Pain subsides when off of feet.  ROM and strength are WNL's.  -GEORGE        PT Plan    PT Plan Comments  Continue next week.  MD follow up at end of next week.  -GEORGE       User Key  (r) = Recorded By, (t) = Taken By, (c) = Cosigned By    Initials Name Provider Type    Nicole Li, PT Physical Therapist            Exercises     Row Name 19 1600             Subjective Comments    Subjective Comments  Pt. says she has to return to work as soon as possible  for financial reasons.  She has no complaints about her wrist today.  Her knee is sore after being on it a little more recently.  -GEORGE         Subjective Pain    Able to rate subjective pain?  yes  -GEORGE      Pre-Treatment Pain Level  2  -GEORGE      Post-Treatment Pain Level  2  -GEORGE         Total Minutes    52413 - PT Therapeutic Exercise Minutes  40  -GEORGE         Exercise 1    Exercise Name 1  Continued exercise in clinical setting, emphasizing LE strength and functional mobility skills.  Reassessed R knee and found it to be stable with behavior of symptoms consistent with degenerative changes exacerbated by injury.    -GEORGE        User Key  (r) = Recorded By, (t) = Taken By, (c) = Cosigned By    Initials Name Provider Type    Nicole Li, PT Physical Therapist                                          Time Calculation:   Start Time: 1030  Total Timed Code Minutes- PT: 40 minute(s)  Therapy Charges for Today     Code Description Service Date Service Provider Modifiers Qty    12130303138  PT THER PROC EA 15 MIN 6/27/2019 Nicole Baca, PT GP 3                    Nicole Baca PT  6/27/2019

## 2019-07-05 ENCOUNTER — OFFICE VISIT (OUTPATIENT)
Dept: ORTHOPEDIC SURGERY | Facility: CLINIC | Age: 54
End: 2019-07-05

## 2019-07-05 ENCOUNTER — HOSPITAL ENCOUNTER (OUTPATIENT)
Dept: PHYSICAL THERAPY | Facility: HOSPITAL | Age: 54
Setting detail: THERAPIES SERIES
Discharge: HOME | End: 2019-07-05

## 2019-07-05 VITALS — HEART RATE: 111 BPM | WEIGHT: 293 LBS | OXYGEN SATURATION: 98 % | BODY MASS INDEX: 41.95 KG/M2 | HEIGHT: 70 IN

## 2019-07-05 DIAGNOSIS — M25.531 RIGHT WRIST PAIN: Primary | ICD-10-CM

## 2019-07-05 DIAGNOSIS — M25.531 RIGHT WRIST PAIN: ICD-10-CM

## 2019-07-05 DIAGNOSIS — S83.91XA SPRAIN OF RIGHT KNEE, UNSPECIFIED LIGAMENT, INITIAL ENCOUNTER: ICD-10-CM

## 2019-07-05 DIAGNOSIS — M25.561 ACUTE PAIN OF RIGHT KNEE: Primary | ICD-10-CM

## 2019-07-05 DIAGNOSIS — Z02.6 ENCOUNTER RELATED TO WORKER'S COMPENSATION CLAIM: ICD-10-CM

## 2019-07-05 PROCEDURE — 97110 THERAPEUTIC EXERCISES: CPT | Performed by: PHYSICAL THERAPIST

## 2019-07-05 PROCEDURE — 99213 OFFICE O/P EST LOW 20 MIN: CPT | Performed by: ORTHOPAEDIC SURGERY

## 2019-07-05 NOTE — PROGRESS NOTES
[facility] Note    Subjective     Chief Complaint   Patient presents with   • Right Wrist - Follow-up     injury at work         HPI  Estefani Blancas is a 53 y.o. female.  Fell at work .  She says she is getting better but still needs restrictions.  Pain is 1 out of 10.    Past Medical History:   Diagnosis Date   • Diabetes mellitus (CMS/HCC)    • Hyperlipidemia    • Hypertension       Past Surgical History:   Procedure Laterality Date   • ADENOIDECTOMY     •  SECTION     • TONSILLECTOMY        History reviewed. No pertinent family history.  Social History     Socioeconomic History   • Marital status:      Spouse name: Not on file   • Number of children: Not on file   • Years of education: Not on file   • Highest education level: Not on file   Tobacco Use   • Smoking status: Never Smoker   • Smokeless tobacco: Never Used   Substance and Sexual Activity   • Alcohol use: No     Frequency: Never   • Drug use: Defer   • Sexual activity: Defer      Current Outpatient Medications on File Prior to Visit   Medication Sig Dispense Refill   • ACETAMINOPHEN 8 HOUR PO Take  by mouth.     • AMLODIPINE BESYLATE PO Take  by mouth.     • Empagliflozin (JARDIANCE PO) Take  by mouth.     • GLIPIZIDE PO Take  by mouth.     • HYDROXYZINE HCL PO Take  by mouth.     • LOSARTAN POTASSIUM PO Take  by mouth.     • Metoprolol-Hydrochlorothiazide (METOPROLOL-HCTZ ER PO) Take  by mouth.     • Misc Natural Products (ESTROVEN ENERGY) tablet Take  by mouth.     • naproxen (NAPROSYN) 375 MG tablet Take 1 tablet by mouth 2 (Two) Times a Day With Meals. 20 tablet 0   • PARoxetine HCl (PAXIL PO) Take  by mouth.       No current facility-administered medications on file prior to visit.       Allergies   Allergen Reactions   • Latex Hives        The following portions of the patient's history were reviewed and updated as appropriate: allergies, current medications, past family history, past  "medical history, past social history, past surgical history and problem list.    Review of Systems   Constitutional: Negative.    Eyes: Negative.    Respiratory: Positive for apnea.    Cardiovascular: Negative.    Gastrointestinal: Negative.    Endocrine: Negative.    Genitourinary: Negative.    Musculoskeletal: Positive for arthralgias (wrist pain).   Skin: Negative.    Allergic/Immunologic: Negative.    Neurological: Positive for headaches.   Hematological: Negative.    Psychiatric/Behavioral: Negative.         Objective      Physical Exam  Pulse 111   Ht 177 cm (69.69\")   Wt (!) 148 kg (326 lb 4.5 oz)   SpO2 98%   BMI 47.24 kg/m²     Body mass index is 47.24 kg/m².    GENERAL APPEARANCE: awake, alert & oriented x 3, in no acute distress and well developed, well nourished  PSYCH: normal mood and affect  LUNGS:  breathing nonlabored, no wheezing      Ortho Exam  Peripheral Vascular   Left Upper Extremity    No cyanotic nail beds    Pink nail beds and rapid capillary refill   Palpation    Radial Pulse - Bilaterally normal    Neurologic   Sensory: Light touch intact- Right and left hand    Left Upper Extremity    Left wrist extensors: 5/5    Left wrist flexors: 5/5    Left intrinsics: 5/5   Right Upper Extremity    Right wrist extensors: 5/5    Right wrist flexors: 5/5    Right intrinsics: 5/5    Musculoskeletal   Left Elbow    Forearm supination: AROM - 90 degrees    Forearm pronation: AROM - 90 degrees   Right Elbow    Forearm supination: AROM - 90 degrees    Forearm pronation: AROM - 90 degrees     Inspection and Palpation   Right Wrist      Tenderness - none    Swelling - none    Crepitus - none    Muscle tone - no atrophy   Left Wrist    Tenderness - none    Swelling - none    Crepitus - none    Muscle tone - no atrophy     ROJM:   Left Wrist    Flexion: AROM - 90 degrees    Extension: AROM - 90 degrees   Right Wrist    Flexion: AROM - 90 degrees    Extension: AROM - 90 degrees     Deformities, " Malalignments, Discrepancies    None     Functional Testing   Right Wrist    Tinel's Sign negative    Phalen's Sign negative    Carpal Compression Test negative   Left Wrist    Tinel's Sign negative    Phalen's Sign negative    Carpal Compression Test negative       Strength and Tone    Right  strength: good    Left  strength: good      Peripheral Vascular:    Upper Extremity:   Inspection:  Left--no cyanotic nail beds Right--no cyanotic nail beds   Bilateral:  Pink nail beds with brisk capillary refill   Palpation:  Bilateral radial pulse normal    Musculoskeletal:  Global Assessment:  Overall assessment of Lower Extremity Muscle Strength and Tone:  Right quadriceps--5/5   Right hamstrings--5/5       Right tibialis anterior--5/5  Right gastroc-soleus--5/5  Right EHL --5/5    Lower Extremity:  Knee/Patella:  No digital clubbing or cyanosis.    Examination of right knee reveals:  Normal deep tendon reflexes, coordination, strength, tone, sensation.  No known fractures or deformities.    Inspection and Palpation:  Right knee:  Tenderness:  Over the medial joint line and moderate severity  Effusion:  none  Crepitus:  Positive  Pulses:  2+  Ecchymosis:  None  Warmth:  None     ROM:  Right:  Extension:120    Flexion: 5  Left:  Extension:120     Flexion:5    Instability:    Right:  Lachman Test:  Negative, Varus stress test negative, Valgus stress test negative    Deformities/Malalignments/Discrepancies:    Left:  No deformities   Right:  Genu Varum    Functional Testing:  Briana's test:  Negative  Patella grind test:  Positive  Q-angle:  normal        Imaging/Studies  Imaging Results (last 7 days)     ** No results found for the last 168 hours. **          Assessment/Plan        ICD-10-CM ICD-9-CM   1. Right wrist pain M25.531 719.43   2. Encounter related to worker's compensation claim [email or web address] V70.3   3. Sprain of right knee, unspecified ligament, initial encounter S83.91XA 844.9       Orders Placed This  Encounter   Procedures   • Ambulatory Referral to Physical Therapy      She will continue physical therapy and follow-up in 1 month.  I anticipate MMI a month from now.  Medical Decision Making  Management Options : over-the-counter medicine and physical/occupational therapy      Bryon Mckeon MD  07/05/19  10:08 AM         EMR Dragon/Transcription disclaimer:  Much of this encounter note is an electronic transcription of spoken language to printed text. Electronic transcription of spoken language may permit erroneous, or at times, nonsensical words or phrases to be inadvertently transcribed. Although I have reviewed the note for such errors, some may still exist.

## 2019-07-05 NOTE — THERAPY TREATMENT NOTE
Outpatient Physical Therapy Ortho Treatment Note  [facility]     Patient Name: Estefani Blancas  : 1965  MRN: [id number]  Today's Date: 2019      Visit Date: 2019    Visit Dx:    ICD-10-CM ICD-9-CM   1. Acute pain of right knee M25.561 719.46   2. Right wrist pain M25.531 719.43       There is no problem list on file for this patient.       Past Medical History:   Diagnosis Date   • Diabetes mellitus (CMS/HCC)    • Hyperlipidemia    • Hypertension         Past Surgical History:   Procedure Laterality Date   • ADENOIDECTOMY     •  SECTION     • TONSILLECTOMY         PT Ortho     Row Name 19 1100       Subjective Comments    Subjective Comments  Pt. saw MD this morning.  She is to continue with restrictions at work and continue PT for 1 month.  -GEORGE       Subjective Pain    Able to rate subjective pain?  yes  -GEORGE    Pre-Treatment Pain Level  1  -GEORGE    Post-Treatment Pain Level  1  -GEORGE      User Key  (r) = Recorded By, (t) = Taken By, (c) = Cosigned By    Initials Name Provider Type    Nicole Li PT Physical Therapist                      PT Assessment/Plan     Row Name 19 1100          PT Assessment    Assessment Comments  Pt. tends to minimize activity the majority of the time, and then over-do with prolonged continuous activity.  She has discomfort with eccentric quad work in standing, but tolerates well on incline today.  She is advised to be as active as her knee tolerates with balance of rest and activity in order to begin conditioning for eventual return to usual work activities.  -GEORGE        PT Plan    PT Plan Comments  Continue PT.  Progress strengthening and functional mobility skills as tolerated.  -GEORGE       User Key  (r) = Recorded By, (t) = Taken By, (c) = Cosigned By    Initials Name Provider Type    Nicole Li PT Physical Therapist            Exercises     Row Name 19 1100             Subjective Comments    Subjective Comments  Pt.  saw MD this morning.  She is to continue with restrictions at work and continue PT for 1 month.  -GEORGE         Subjective Pain    Able to rate subjective pain?  yes  -GEORGE      Pre-Treatment Pain Level  1  -GEORGE      Post-Treatment Pain Level  1  -GEORGE         Total Minutes    95752 - PT Therapeutic Exercise Minutes  30  -GEORGE         Exercise 1    Exercise Name 1  Active warm up on Nu-Step followed by LE exercises per flow sheet.  -GEORGE        User Key  (r) = Recorded By, (t) = Taken By, (c) = Cosigned By    Initials Name Provider Type    Nicole Li, PT Physical Therapist                                          Time Calculation:   Start Time: 1040  Total Timed Code Minutes- PT: 30 minute(s)  Therapy Charges for Today     Code Description Service Date Service Provider Modifiers Qty    51101776067  PT THER PROC EA 15 MIN 7/5/2019 Nicole Baca, PT GP 2                    Nicole Baca, PT  7/5/2019

## 2019-07-09 ENCOUNTER — HOSPITAL ENCOUNTER (OUTPATIENT)
Dept: PHYSICAL THERAPY | Facility: HOSPITAL | Age: 54
Setting detail: THERAPIES SERIES
Discharge: HOME | End: 2019-07-09

## 2019-07-09 PROCEDURE — 97110 THERAPEUTIC EXERCISES: CPT | Performed by: PHYSICAL THERAPIST

## 2019-07-09 NOTE — THERAPY PROGRESS REPORT/RE-CERT
Outpatient Physical Therapy Ortho Progress Note  [facility]     Patient Name: Estefani Blancas  : 1965  MRN: [id number]  Today's Date: 2019      Visit Date: 2019    Visit Dx:  No diagnosis found.    There is no problem list on file for this patient.       Past Medical History:   Diagnosis Date   • Diabetes mellitus (CMS/HCC)    • Hyperlipidemia    • Hypertension         Past Surgical History:   Procedure Laterality Date   • ADENOIDECTOMY     •  SECTION     • TONSILLECTOMY         PT Ortho     Row Name 19 1100       Subjective Comments    Subjective Comments  Pt. reports increase in R knee pain while driving here today.   -GEORGE       Subjective Pain    Able to rate subjective pain?  yes  -GEORGE    Pre-Treatment Pain Level  2 knee  -GEORGE    Post-Treatment Pain Level  2  -GEORGE       Right Hand Strength - Pinch (lbs)    Lateral  12 lbs  -GEORGE    Tip (2 point)  11 lbs  -GEORGE    Tip (3 point)  11 lbs  -GEORGE        Strength Right    # Reps  3  -GEORGE    Right Rung  2  -GEORGE    Right  Test 1  43  -GEORGE    Right  Test 2  48  -GEORGE    Right  Test 3  49  -GEORGE     Strength Average Right  46.67  -GEORGE      User Key  (r) = Recorded By, (t) = Taken By, (c) = Cosigned By    Initials Name Provider Type    Nicole Li, PT Physical Therapist                      PT Assessment/Plan     Row Name 19 1500          PT Assessment    Assessment Comments  Pt. reports fluctuation of knee symptoms from no pain to 2/10 pain.  She had no pain prior to driving here today, and now says knee pain is 2/10.  She reports even less wrist pain which is consistent with DeQuervain's tendinitis.  -GEORGE        PT Plan    PT Plan Comments  Continue and progress as tolerated.  -GEORGE       User Key  (r) = Recorded By, (t) = Taken By, (c) = Cosigned By    Initials Name Provider Type    Nicole Li PT Physical Therapist            Exercises     Row Name 19 1100             Subjective Comments     Subjective Comments  Pt. reports increase in R knee pain while driving here today.   -GEORGE         Subjective Pain    Able to rate subjective pain?  yes  -GEORGE      Pre-Treatment Pain Level  2 knee  -GEORGE      Post-Treatment Pain Level  2  -         Total Minutes    40118 - PT Therapeutic Exercise Minutes  30  -GEORGE         Exercise 1    Exercise Name 1  Reassessment completed today.  Added resisted hamstring strengthening with green band in sitting.  Also added calf stretching die to report of leg cramps.  Also encouraged pt. to drink enough water and be attentive to caclium and potassium intake.    -        User Key  (r) = Recorded By, (t) = Taken By, (c) = Cosigned By    Initials Name Provider Type    Nicole Li, PT Physical Therapist                       PT OP Goals     Row Name 07/09/19 1100          PT Short Term Goals    STG Date to Achieve  06/06/19  -     STG 1  Pt to be compliant w/ initial HEP and pn/edema mgmt  -     STG 1 Progress  Met  -     STG 2  Pt to report at least a 30% improvement in pn.  -     STG 2 Progress  Met  -     STG 3  Pt to improve R knee flxn ROM to 100 deg  -     STG 3 Progress  Met  -        Long Term Goals    LTG Date to Achieve  06/27/19  -     LTG 1  Pt to be independent w/ long term HEP and self mgmt  -     LTG 1 Progress  Progressing;Ongoing  -     LTG 2  Pt to report at least a 60% improvement in pn.  -     LTG 2 Progress  Met  -     LTG 3  Pt to improve R knee flxn ROM to 115 deg  -     LTG 3 Progress  Met  -     LTG 4  Pt to improve LEFS score to 30/80 or better to reflect improved pn and function.  -     LTG 4 Progress  Not Met  -     LTG 4 Progress Comments  regressed since last reassessment  -     LTG 5  Improve R hand strength to WFL's for daily tasks.  -     LTG 5 Progress  Met  -     LTG 6  Improve R wrist ROM to WNL's.  -     LTG 6 Progress  Met  -       User Key  (r) = Recorded By, (t) = Taken By, (c) = Cosigned By     Initials Name Provider Type    Nicole Li, PT Physical Therapist          Therapy Education  Given: HEP  Program: Reinforced, Progressed  How Provided: Verbal, Demonstration, Written  Provided to: Patient  Level of Understanding: Verbalized, Demonstrated    Outcome Measure Options: Quick DASH  Quick DASH  Open a tight or new jar.: Mild Difficulty  Do heavy household chores (e.g., wash walls, wash floors): Mild Difficulty  Carry a shopping bag or briefcase: Moderate Difficulty  Wash your back: Mild Difficulty  Use a knife to cut food: Mild Difficulty  Recreational activities in which you take some force or impact through your arm, should or hand (e.g. golf, hammering, tennis, etc.): Mild Difficulty  During the past week, to what extent has your arm, shoulder, or hand problem interfered with your normal social activites with family, friends, neighbors or groups?: Slightly  During the past week, were you limited in your work or other regular daily activities as a result of your arm, shoulder or hand problem?: Not limited at all  Arm, Shoulder, or hand pain: Mild  Tingling (pins and needles) in your arm, shoulder, or hand: None  During the past week, how much difficulty have you had sleeping because of the pain in your arm, shoulder or hand?: No difficulty  Number of Questions Answered: 11  Quick DASH Score: 20.45  Lower Extremity Functional Index  Any of your usual work, housework or school activities: Quite a bit of difficulty  Your usual hobbies, recreational or sporting activities: Quite a bit of difficulty  Getting into or out of the bath: Moderate difficulty  Walking between rooms: Moderate difficulty  Putting on your shoes or socks: No difficulty  Squatting: Quite a bit of difficulty  Lifting an object, like a bag of groceries from the floor: No difficulty  Performing light activities around your home: Quite a bit of difficulty  Performing heavy activities around your home: Moderate difficulty  Getting  into or out of a car: Extreme difficulty or unable to perform activity  Walking 2 blocks: Extreme difficulty or unable to perform activity  Walking a mile: Extreme difficulty or unable to perform activity  Going up or down 10 stairs (about 1 flight of stairs): Moderate difficulty  Standing for 1 hour: Extreme difficulty or unable to perform activity  Sitting for 1 hour: A little bit of difficulty  Running on even ground: Extreme difficulty or unable to perform activity  Running on uneven ground: Extreme difficulty or unable to perform activity  Making sharp turns while running fast: Extreme difficulty or unable to perform activity  Hopping: Extreme difficulty or unable to perform activity  Rolling over in bed: A little bit of difficulty  Total: 26      Time Calculation:   Start Time: 1115  Total Timed Code Minutes- PT: 30 minute(s)  Therapy Charges for Today     Code Description Service Date Service Provider Modifiers Qty    26472767755 HC PT THER PROC EA 15 MIN 7/9/2019 Nicole Baca, PT GP 2          PT G-Codes  Outcome Measure Options: Quick DASH  Quick DASH Score: 20.45  Total: 26         Nicole Baca PT  7/9/2019

## 2019-07-11 ENCOUNTER — HOSPITAL ENCOUNTER (OUTPATIENT)
Dept: PHYSICAL THERAPY | Facility: HOSPITAL | Age: 54
Setting detail: THERAPIES SERIES
Discharge: HOME | End: 2019-07-11

## 2019-07-11 DIAGNOSIS — M25.561 ACUTE PAIN OF RIGHT KNEE: Primary | ICD-10-CM

## 2019-07-11 PROCEDURE — 97110 THERAPEUTIC EXERCISES: CPT | Performed by: PHYSICAL THERAPIST

## 2019-07-11 NOTE — THERAPY TREATMENT NOTE
Outpatient Physical Therapy Ortho Treatment Note  [facility]     Patient Name: Estefani Blancas  : 1965  MRN: [id number]  Today's Date: 2019      Visit Date: 2019    Visit Dx:    ICD-10-CM ICD-9-CM   1. Acute pain of right knee M25.561 719.46       There is no problem list on file for this patient.       Past Medical History:   Diagnosis Date   • Diabetes mellitus (CMS/HCC)    • Hyperlipidemia    • Hypertension         Past Surgical History:   Procedure Laterality Date   • ADENOIDECTOMY     •  SECTION     • TONSILLECTOMY         PT Ortho     Row Name 19 1200       Subjective Comments    Subjective Comments  Pt. reports no wrist pain.  Knee pain is 0-1/10.  Her primary concerns are squatting and stairs.  -GEORGE       Subjective Pain    Able to rate subjective pain?  yes  -GEORGE    Pre-Treatment Pain Level  1  -GEORGE    Post-Treatment Pain Level  1  -GEORGE    Row Name 19 1100       Subjective Comments    Subjective Comments  Pt. reports increase in R knee pain while driving here today.   -GEORGE       Subjective Pain    Able to rate subjective pain?  yes  -GEORGE    Pre-Treatment Pain Level  2 knee  -GEORGE    Post-Treatment Pain Level  2  -GEORGE       Right Hand Strength - Pinch (lbs)    Lateral  12 lbs  -GEORGE    Tip (2 point)  11 lbs  -GEORGE    Tip (3 point)  11 lbs  -GEORGE        Strength Right    # Reps  3  -GEORGE    Right Rung  2  -GEORGE    Right  Test 1  43  -GEORGE    Right  Test 2  48  -EGORGE    Right  Test 3  49  -GEORGE     Strength Average Right  46.67  -GEORGE      User Key  (r) = Recorded By, (t) = Taken By, (c) = Cosigned By    Initials Name Provider Type    Nicole Li, PT Physical Therapist                      PT Assessment/Plan     Row Name 19 1200          PT Assessment    Assessment Comments  Mild R knee pain, but continued difficulty with eccentric strength for tasks such as squatting and stair climbing.  -GEORGE        PT Plan    PT Plan Comments  Continue to progress R LE  strength in order to improve overall functional abilities.  -       User Key  (r) = Recorded By, (t) = Taken By, (c) = Cosigned By    Initials Name Provider Type    Nicole Li PT Physical Therapist            Exercises     Row Name 07/11/19 1200             Subjective Comments    Subjective Comments  Pt. reports no wrist pain.  Knee pain is 0-1/10.  Her primary concerns are squatting and stairs.  -GEORGE         Subjective Pain    Able to rate subjective pain?  yes  -GEORGE      Pre-Treatment Pain Level  1  -GEORGE      Post-Treatment Pain Level  1  -GEORGE         Total Minutes    99693 - PT Therapeutic Exercise Minutes  30  -GEORGE         Exercise 1    Exercise Name 1  Continued exercises in clinical setting emphasizing functional quad strength.  Particular emphasis today on eccentric control of R quad in open anad closed chain situations.  -GEORGE        User Key  (r) = Recorded By, (t) = Taken By, (c) = Cosigned By    Initials Name Provider Type    Nicole Li PT Physical Therapist                                          Time Calculation:   Start Time: 1100  Total Timed Code Minutes- PT: 30 minute(s)  Therapy Charges for Today     Code Description Service Date Service Provider Modifiers Qty    13762033618  PT THER PROC EA 15 MIN 7/11/2019 Nicole Baca, PT GP 2                    Nicole Baca, PT  7/11/2019

## 2019-07-15 ENCOUNTER — HOSPITAL ENCOUNTER (OUTPATIENT)
Dept: PHYSICAL THERAPY | Facility: HOSPITAL | Age: 54
Setting detail: THERAPIES SERIES
Discharge: HOME | End: 2019-07-15

## 2019-07-15 DIAGNOSIS — M25.561 ACUTE PAIN OF RIGHT KNEE: Primary | ICD-10-CM

## 2019-07-15 PROCEDURE — 97110 THERAPEUTIC EXERCISES: CPT | Performed by: PHYSICAL THERAPIST

## 2019-07-15 NOTE — THERAPY TREATMENT NOTE
Outpatient Physical Therapy Ortho Treatment Note  [facility]     Patient Name: Estefani Blancas  : 1965  MRN: [id number]  Today's Date: 7/15/2019      Visit Date: 07/15/2019    Visit Dx:    ICD-10-CM ICD-9-CM   1. Acute pain of right knee M25.561 719.46       There is no problem list on file for this patient.       Past Medical History:   Diagnosis Date   • Diabetes mellitus (CMS/HCC)    • Hyperlipidemia    • Hypertension         Past Surgical History:   Procedure Laterality Date   • ADENOIDECTOMY     •  SECTION     • TONSILLECTOMY         PT Ortho     Row Name 07/15/19 1300       Subjective Comments    Subjective Comments  Pt. reports increased R medial knee pain for no known reason.  She recalls doing nothing on Saturday.  On , she went to [employer], did some dishes, and then did nothing the rest of the day.  -GEORGE       Subjective Pain    Able to rate subjective pain?  yes  -GEORGE    Pre-Treatment Pain Level  2  -GEORGE    Post-Treatment Pain Level  3  -GEORGE      User Key  (r) = Recorded By, (t) = Taken By, (c) = Cosigned By    Initials Name Provider Type    Nicole Li PT Physical Therapist                      PT Assessment/Plan     Row Name 07/15/19 1300          PT Assessment    Assessment Comments  Knee pain increased today for no known reason.  Pt. indicated tenderness even in sidelying with knees touching.  She declined ice in clinic.  She plans to apply ice at home.  -GEORGE        PT Plan    PT Plan Comments  Continue as symptoms indicate.  -       User Key  (r) = Recorded By, (t) = Taken By, (c) = Cosigned By    Initials Name Provider Type    Nicole Li PT Physical Therapist            Exercises     Row Name 07/15/19 1300             Subjective Comments    Subjective Comments  Pt. reports increased R medial knee pain for no known reason.  She recalls doing nothing on Saturday.  On , she went to [employer], did some dishes, and then did nothing the rest of the day.   -GEORGE         Subjective Pain    Able to rate subjective pain?  yes  -GEORGE      Pre-Treatment Pain Level  2  -GEORGE      Post-Treatment Pain Level  3  -GEORGE         Total Minutes    48889 - PT Therapeutic Exercise Minutes  30  -GEORGE         Exercise 1    Exercise Name 1  Active warm up on Nu-Step followed by emphasis on open chain/NWB LE exercises per flow sheet.  Provided additional illustrations for HEP in NWB to allow knee pain to subside.  -GEORGE        User Key  (r) = Recorded By, (t) = Taken By, (c) = Cosigned By    Initials Name Provider Type    Nicole Li, PT Physical Therapist                                          Time Calculation:   Start Time: 1300  Total Timed Code Minutes- PT: 30 minute(s)  Therapy Charges for Today     Code Description Service Date Service Provider Modifiers Qty    97563325042 HC PT THER PROC EA 15 MIN 7/15/2019 Nicole Baca, PT GP 2                    Nicole Baca, RACHEL  7/15/2019

## 2019-08-01 ENCOUNTER — HOSPITAL ENCOUNTER (OUTPATIENT)
Dept: PHYSICAL THERAPY | Facility: HOSPITAL | Age: 54
Setting detail: THERAPIES SERIES
Discharge: HOME | End: 2019-08-01

## 2019-08-01 DIAGNOSIS — M25.531 RIGHT WRIST PAIN: ICD-10-CM

## 2019-08-01 DIAGNOSIS — M25.561 ACUTE PAIN OF RIGHT KNEE: Primary | ICD-10-CM

## 2019-08-01 PROCEDURE — 97110 THERAPEUTIC EXERCISES: CPT | Performed by: PHYSICAL THERAPIST

## 2019-08-01 NOTE — THERAPY TREATMENT NOTE
Outpatient Physical Therapy Ortho Treatment Note  [facility]     Patient Name: Estefani Blancas  : 1965  MRN: [id number]  Today's Date: 2019      Visit Date: 2019    Visit Dx:    ICD-10-CM ICD-9-CM   1. Acute pain of right knee M25.561 719.46   2. Right wrist pain M25.531 719.43       There is no problem list on file for this patient.       Past Medical History:   Diagnosis Date   • Diabetes mellitus (CMS/HCC)    • Hyperlipidemia    • Hypertension         Past Surgical History:   Procedure Laterality Date   • ADENOIDECTOMY     •  SECTION     • TONSILLECTOMY         PT Ortho     Row Name 19 1100       Subjective Pain    Able to rate subjective pain?  yes  -GEORGE    Pre-Treatment Pain Level  1  -GEORGE    Post-Treatment Pain Level  1  -GEORGE      User Key  (r) = Recorded By, (t) = Taken By, (c) = Cosigned By    Initials Name Provider Type    Nicole Li PT Physical Therapist                      PT Assessment/Plan     Row Name 19 1100          PT Assessment    Assessment Comments  R knee pain mild and functional abilities improving.    -GEORGE        PT Plan    PT Plan Comments  Continue PT with progression of LE strength and endurance within tolerance.  -GEORGE       User Key  (r) = Recorded By, (t) = Taken By, (c) = Cosigned By    Initials Name Provider Type    Nicole Li PT Physical Therapist            Exercises     Row Name 19 1100             Subjective Comments    Subjective Comments  Pt. reports mild R knee pain (no worse than 3/10) and no wrist pain.  Thenar eminence with intermittent numbness, no functional limitations.  -GEORGE         Subjective Pain    Able to rate subjective pain?  yes  -GEORGE      Pre-Treatment Pain Level  1  -GEORGE      Post-Treatment Pain Level  1  -GEORGE         Total Minutes    19374 - PT Therapeutic Exercise Minutes  30  -GEORGE         Exercise 1    Exercise Name 1  Active warm up on Nu-Step, added tendon glides for R hand to HEP.  Pt.  indicates mild knee discomfort and points to medial knee, superior to joint line (MFC).  Sit to stand is performed without difficulty.  Stair climbing shows improvement, using reciprocal pattern.  Squatting is difficult and uncomfortable.  Practiced shallow squats with emphasis on good form and staying within pain free range.    -GEORGE        User Key  (r) = Recorded By, (t) = Taken By, (c) = Cosigned By    Initials Name Provider Type    Nicole Li PT Physical Therapist                       PT OP Goals     Row Name 08/01/19 1100          PT Short Term Goals    STG Date to Achieve  06/06/19  -GEORGE     STG 1  Pt to be compliant w/ initial HEP and pn/edema mgmt  -GEORGE     STG 1 Progress  Met  -GEORGE     STG 2  Pt to report at least a 30% improvement in pn.  -GEORGE     STG 2 Progress  Met  -GEORGE     STG 3  Pt to improve R knee flxn ROM to 100 deg  -GEORGE     STG 3 Progress  Met  -GEORGE        Long Term Goals    LTG Date to Achieve  06/27/19  -GEORGE     LTG 1  Pt to be independent w/ long term HEP and self mgmt  -GEORGE     LTG 1 Progress  Progressing;Ongoing  -GEORGE     LTG 2  Pt to report at least a 60% improvement in pn.  -GEORGE     LTG 2 Progress  Met  -GEORGE     LTG 3  Pt to improve R knee flxn ROM to 115 deg  -GEORGE     LTG 3 Progress  Met  -GEORGE     LTG 4  Pt to improve LEFS score to 30/80 or better to reflect improved pn and function.  -GEORGE     LTG 4 Progress  Met  -GEORGE     LTG 5  Improve R hand strength to WFL's for daily tasks.  -GEORGE     LTG 5 Progress  Met  -GEORGE     LTG 6  Improve R wrist ROM to WNL's.  -GEORGE     LTG 6 Progress  Met  -GEORGE       User Key  (r) = Recorded By, (t) = Taken By, (c) = Cosigned By    Initials Name Provider Type    Nicole Li PT Physical Therapist                  Lower Extremity Functional Index  Any of your usual work, housework or school activities: A little bit of difficulty  Your usual hobbies, recreational or sporting activities: A little bit of difficulty  Getting into or out of the bath: No  difficulty  Walking between rooms: A little bit of difficulty  Putting on your shoes or socks: No difficulty  Squatting: Quite a bit of difficulty  Lifting an object, like a bag of groceries from the floor: A little bit of difficulty  Performing light activities around your home: A little bit of difficulty  Performing heavy activities around your home: Moderate difficulty  Getting into or out of a car: A little bit of difficulty  Walking 2 blocks: A little bit of difficulty  Walking a mile: Moderate difficulty  Going up or down 10 stairs (about 1 flight of stairs): A little bit of difficulty  Standing for 1 hour: Moderate difficulty  Sitting for 1 hour: No difficulty  Running on even ground: Extreme difficulty or unable to perform activity  Running on uneven ground: Extreme difficulty or unable to perform activity  Making sharp turns while running fast: Extreme difficulty or unable to perform activity  Hopping: Extreme difficulty or unable to perform activity  Rolling over in bed: No difficulty  Total: 47      Time Calculation:   Start Time: 1110  Total Timed Code Minutes- PT: 30 minute(s)  Therapy Charges for Today     Code Description Service Date Service Provider Modifiers Qty    47926420857 HC PT THER PROC EA 15 MIN 8/1/2019 Nicole Baca, PT GP 2          PT G-Codes  Total: 47         Nicole Baca PT  8/1/2019

## 2019-08-09 ENCOUNTER — OFFICE VISIT (OUTPATIENT)
Dept: ORTHOPEDIC SURGERY | Facility: CLINIC | Age: 54
End: 2019-08-09

## 2019-08-09 VITALS — BODY MASS INDEX: 41.95 KG/M2 | HEIGHT: 70 IN | OXYGEN SATURATION: 98 % | WEIGHT: 293 LBS | HEART RATE: 113 BPM

## 2019-08-09 DIAGNOSIS — M25.531 RIGHT WRIST PAIN: Primary | ICD-10-CM

## 2019-08-09 DIAGNOSIS — Z02.6 ENCOUNTER RELATED TO WORKER'S COMPENSATION CLAIM: ICD-10-CM

## 2019-08-09 PROCEDURE — 99212 OFFICE O/P EST SF 10 MIN: CPT | Performed by: ORTHOPAEDIC SURGERY

## 2019-08-09 NOTE — PROGRESS NOTES
[facility] Orthopaedic Surgery Clinic Note    Subjective     Chief Complaint   Patient presents with   • Follow-up     1 month f/u; Right Wrist; injury at work         HPI  Estefani Blancas is a 53 y.o. female.  She is doing much better with her right knee and wrist injuries.  Pain is currently 0.  She has occasional swelling about her right wrist.    Past Medical History:   Diagnosis Date   • Diabetes mellitus (CMS/HCC)    • Hyperlipidemia    • Hypertension       Past Surgical History:   Procedure Laterality Date   • ADENOIDECTOMY     •  SECTION     • TONSILLECTOMY        History reviewed. No pertinent family history.  Social History     Socioeconomic History   • Marital status:      Spouse name: Not on file   • Number of children: Not on file   • Years of education: Not on file   • Highest education level: Not on file   Tobacco Use   • Smoking status: Never Smoker   • Smokeless tobacco: Never Used   Substance and Sexual Activity   • Alcohol use: No     Frequency: Never   • Drug use: Defer   • Sexual activity: Defer      Current Outpatient Medications on File Prior to Visit   Medication Sig Dispense Refill   • ACETAMINOPHEN 8 HOUR PO Take  by mouth.     • AMLODIPINE BESYLATE PO Take  by mouth.     • Empagliflozin (JARDIANCE PO) Take  by mouth.     • GLIPIZIDE PO Take  by mouth.     • HYDROXYZINE HCL PO Take  by mouth.     • LOSARTAN POTASSIUM PO Take  by mouth.     • Metoprolol-Hydrochlorothiazide (METOPROLOL-HCTZ ER PO) Take  by mouth.     • Misc Natural Products (ESTROVEN ENERGY) tablet Take  by mouth.     • naproxen (NAPROSYN) 375 MG tablet Take 1 tablet by mouth 2 (Two) Times a Day With Meals. 20 tablet 0   • PARoxetine HCl (PAXIL PO) Take  by mouth.       No current facility-administered medications on file prior to visit.       Allergies   Allergen Reactions   • Latex Hives        The following portions of the patient's history were reviewed and updated as appropriate: allergies, current  "medications, past family history, past medical history, past social history, past surgical history and problem list.    Review of Systems   Constitutional: Negative.    HENT: Positive for postnasal drip and sneezing.    Eyes: Positive for itching.   Respiratory: Negative.    Cardiovascular: Negative.    Gastrointestinal: Negative.    Endocrine: Negative.    Genitourinary: Negative.    Musculoskeletal: Positive for arthralgias.   Skin: Negative.    Allergic/Immunologic: Negative.    Neurological: Negative.    Hematological: Negative.    Psychiatric/Behavioral: Negative.         Objective      Physical Exam  Pulse 113   Ht 177 cm (69.69\")   Wt (!) 152 kg (334 lb 13 oz)   SpO2 98%   BMI 48.48 kg/m²     Body mass index is 48.48 kg/m².    GENERAL APPEARANCE: awake, alert & oriented x 3, in no acute distress and well developed, well nourished  PSYCH: normal mood and affect         Ortho Exam  Peripheral Vascular   Left Upper Extremity    No cyanotic nail beds    Pink nail beds and rapid capillary refill   Palpation    Radial Pulse - Bilaterally normal    Neurologic   Sensory: Light touch intact- Right and left hand    Left Upper Extremity    Left wrist extensors: 5/5    Left wrist flexors: 5/5    Left intrinsics: 5/5   Right Upper Extremity    Right wrist extensors: 5/5    Right wrist flexors: 5/5    Right intrinsics: 5/5    Musculoskeletal   Left Elbow    Forearm supination: AROM - 90 degrees    Forearm pronation: AROM - 90 degrees   Right Elbow    Forearm supination: AROM - 90 degrees    Forearm pronation: AROM - 90 degrees     Inspection and Palpation   Right Wrist      Tenderness - none    Swelling - none    Crepitus - none    Muscle tone - no atrophy   Left Wrist    Tenderness - none    Swelling - none    Crepitus - none    Muscle tone - no atrophy     ROJM:   Left Wrist    Flexion: AROM - 90 degrees    Extension: AROM - 90 degrees   Right Wrist    Flexion: AROM - 90 degrees    Extension: AROM - 90 " degrees     Deformities, Malalignments, Discrepancies    None     Functional Testing   Right Wrist    Tinel's Sign negative    Phalen's Sign negative    Carpal Compression Test negative   Left Wrist    Tinel's Sign negative    Phalen's Sign negative    Carpal Compression Test negative       Strength and Tone    Right  strength: good    Left  strength: good      Peripheral Vascular:    Upper Extremity:   Inspection:  Left--no cyanotic nail beds Right--no cyanotic nail beds   Bilateral:  Pink nail beds with brisk capillary refill   Palpation:  Bilateral radial pulse normal  Musculoskeletal:  Global Assessment:  Overall assessment of Lower Extremity Muscle Strength and Tone:  Right quadriceps--5/5  Right hamstrings--5/5  Right tibialis anterior--5/5  Right gastroc soleus--5/5  Right EHL--5/5  Lower Extremity:  Knee/Patella:  No digital clubbing or cyanosis.    Examination of right knee reveals:  Normal deep tendon reflexes, coordination, strength, tone, sensation.  No known fractures or deformities.  Inspection and Palpation:    Right knee:  Tenderness:  none  Effusion:  none  Crepitus:  none  Pulses:  2+  Ecchymosis:  None  Warmth:  None   ROM:  Right:  Extension:0    Flexion:135  Left:  Extension:0     Flexion:135  Instability:  Right:  Lachman Test:  Negative, Varus stress test negative,   Valgus stress test negative, Posterior Drawer Test:  Negative  Deformities/Malalignments/Discrepancies:    Left:  none  Right:  none  Functional Testing:  Right:  Briana's test:  Negative  Patella grind test:  Negative  Q-angle:  Normal  Apprehension Sign:  Negative        Imaging/Studies  Imaging Results (last 7 days)     ** No results found for the last 168 hours. **          Assessment/Plan        ICD-10-CM ICD-9-CM   1. Right wrist pain M25.531 719.43   2. Encounter related to worker's compensation claim Z02.6 V70.3     We will continue lifting restrictions of no lifting more than 15 pounds.  We will limit walking  and standing to 2 hours a day for 2 weeks followed by 4 hours a day for 2 weeks followed by 6 hours a day for 2 weeks followed by 8 hours a day.  After that 8-week.  She should be MMI and full duty with no lifting restrictions at that point.  I will see her back as needed.  MMI 2 months from today  Medical Decision Making  Management Options : over-the-counter medicine      Bryon Mckeon MD  08/09/19  10:44 AM         EMR Dragon/Transcription disclaimer:  Much of this encounter note is an electronic transcription of spoken language to printed text. Electronic transcription of spoken language may permit erroneous, or at times, nonsensical words or phrases to be inadvertently transcribed. Although I have reviewed the note for such errors, some may still exist.

## 2019-08-12 ENCOUNTER — TELEPHONE (OUTPATIENT)
Dept: ORTHOPEDIC SURGERY | Facility: CLINIC | Age: 54
End: 2019-08-12

## 2019-08-12 NOTE — TELEPHONE ENCOUNTER
I spoke with pt. 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 She asked for letter to be mailed to her address on file. Address was confirmed. Letter was put in the mail today, but will not go out until tomorrow. Pt verbalized understanding.   -TMT 08/12/2019

## 2019-08-12 NOTE — TELEPHONE ENCOUNTER
Pt saw Dr Mckeon on Friday and was put on a gradual work release, every two weeks.    Her employer is only allowing her to work 10 hours a week on those restrictions.    Can she be put back to full duty on hand and knee? Pt states that if she has any more issues after that then she'll come back to the office.    Pt contact [phone]

## 2019-08-15 ENCOUNTER — DOCUMENTATION (OUTPATIENT)
Dept: PHYSICAL THERAPY | Facility: HOSPITAL | Age: 54
End: 2019-08-15

## 2019-08-15 DIAGNOSIS — M25.531 RIGHT WRIST PAIN: ICD-10-CM

## 2019-08-15 DIAGNOSIS — M25.561 ACUTE PAIN OF RIGHT KNEE: Primary | ICD-10-CM

## 2019-08-15 NOTE — THERAPY DISCHARGE NOTE
Outpatient Physical Therapy Discharge Summary         Patient Name: Estefani Blancas  : 1965  MRN: [id number]    Today's Date: 8/15/2019    Visit Dx:    ICD-10-CM ICD-9-CM   1. Acute pain of right knee M25.561 719.46   2. Right wrist pain M25.531 719.43       PT OP Goals     Row Name 08/15/19 0700          PT Short Term Goals    STG Date to Achieve  19  -GEORGE     STG 1  Pt to be compliant w/ initial HEP and pn/edema mgmt  -GEORGE     STG 1 Progress  Met  -GEORGE     STG 2  Pt to report at least a 30% improvement in pn.  -GEORGE     STG 2 Progress  Met  -GEORGE     STG 3  Pt to improve R knee flxn ROM to 100 deg  -GEORGE     STG 3 Progress  Met  -GEORGE        Long Term Goals    LTG Date to Achieve  19  -GEORGE     LTG 1  Pt to be independent w/ long term HEP and self mgmt  -GEORGE     LTG 1 Progress  Progressing;Ongoing  -GEORGE     LTG 2  Pt to report at least a 60% improvement in pn.  -GEORGE     LTG 2 Progress  Met  -GEORGE     LTG 3  Pt to improve R knee flxn ROM to 115 deg  -GEORGE     LTG 3 Progress  Met  -GEORGE     LTG 4  Pt to improve LEFS score to 30/80 or better to reflect improved pn and function.  -GEORGE     LTG 4 Progress  Met  -GEORGE     LTG 5  Improve R hand strength to WFL's for daily tasks.  -GEORGE     LTG 5 Progress  Met  -GEORGE     LTG 6  Improve R wrist ROM to WNL's.  -GEORGE     LTG 6 Progress  Met  -GEORGE       User Key  (r) = Recorded By, (t) = Taken By, (c) = Cosigned By    Initials Name Provider Type    Nicole Li, PT Physical Therapist          OP PT Discharge Summary  Date of Discharge: 08/15/19  Reason for Discharge: Patient/Caregiver request  Outcomes Achieved: Patient able to partially acheive established goals  Discharge Destination: Returned to work      Time Calculation:                    Nicole Baca PT  8/15/2019

## 2021-07-15 ENCOUNTER — APPOINTMENT (OUTPATIENT)
Dept: GENERAL RADIOLOGY | Facility: HOSPITAL | Age: 56
End: 2021-07-15

## 2021-07-15 ENCOUNTER — HOSPITAL ENCOUNTER (EMERGENCY)
Facility: HOSPITAL | Age: 56
Discharge: HOME | End: 2021-07-15
Attending: EMERGENCY MEDICINE | Admitting: EMERGENCY MEDICINE

## 2021-07-15 ENCOUNTER — APPOINTMENT (OUTPATIENT)
Dept: CT IMAGING | Facility: HOSPITAL | Age: 56
End: 2021-07-15

## 2021-07-15 VITALS
HEIGHT: 70 IN | SYSTOLIC BLOOD PRESSURE: 138 MMHG | OXYGEN SATURATION: 91 % | TEMPERATURE: 99 F | HEART RATE: 61 BPM | DIASTOLIC BLOOD PRESSURE: 83 MMHG | BODY MASS INDEX: 41.95 KG/M2 | RESPIRATION RATE: 16 BRPM | WEIGHT: 293 LBS

## 2021-07-15 DIAGNOSIS — S86.911A KNEE STRAIN, RIGHT, INITIAL ENCOUNTER: ICD-10-CM

## 2021-07-15 DIAGNOSIS — S40.011A CONTUSION OF RIGHT SHOULDER, INITIAL ENCOUNTER: ICD-10-CM

## 2021-07-15 DIAGNOSIS — S39.012A STRAIN OF LUMBAR REGION, INITIAL ENCOUNTER: ICD-10-CM

## 2021-07-15 DIAGNOSIS — S00.03XA CONTUSION OF SCALP, INITIAL ENCOUNTER: Primary | ICD-10-CM

## 2021-07-15 DIAGNOSIS — S66.911A MUSCLE STRAIN OF RIGHT WRIST, INITIAL ENCOUNTER: ICD-10-CM

## 2021-07-15 DIAGNOSIS — S16.1XXA ACUTE CERVICAL MYOFASCIAL STRAIN, INITIAL ENCOUNTER: ICD-10-CM

## 2021-07-15 PROCEDURE — 72100 X-RAY EXAM L-S SPINE 2/3 VWS: CPT

## 2021-07-15 PROCEDURE — 99284 EMERGENCY DEPT VISIT MOD MDM: CPT

## 2021-07-15 PROCEDURE — 72072 X-RAY EXAM THORAC SPINE 3VWS: CPT

## 2021-07-15 PROCEDURE — 73560 X-RAY EXAM OF KNEE 1 OR 2: CPT

## 2021-07-15 PROCEDURE — 70450 CT HEAD/BRAIN W/O DYE: CPT

## 2021-07-15 PROCEDURE — 72040 X-RAY EXAM NECK SPINE 2-3 VW: CPT

## 2021-07-15 PROCEDURE — 73030 X-RAY EXAM OF SHOULDER: CPT

## 2021-07-15 PROCEDURE — 63710000001 ONDANSETRON PER 8 MG: Performed by: EMERGENCY MEDICINE

## 2021-07-15 PROCEDURE — 73110 X-RAY EXAM OF WRIST: CPT

## 2021-07-15 RX ORDER — MECLIZINE HCL 12.5 MG/1
12.5 TABLET ORAL 3 TIMES DAILY PRN
COMMUNITY

## 2021-07-15 RX ORDER — ONDANSETRON 4 MG/1
4 TABLET, FILM COATED ORAL ONCE
Status: COMPLETED | OUTPATIENT
Start: 2021-07-15 | End: 2021-07-15

## 2021-07-15 RX ORDER — HYDROCODONE BITARTRATE AND ACETAMINOPHEN 7.5; 325 MG/1; MG/1
1 TABLET ORAL ONCE
Status: COMPLETED | OUTPATIENT
Start: 2021-07-15 | End: 2021-07-15

## 2021-07-15 RX ORDER — METHOCARBAMOL 750 MG/1
750 TABLET, FILM COATED ORAL 3 TIMES DAILY PRN
Qty: 20 TABLET | Refills: 0 | Status: SHIPPED | OUTPATIENT
Start: 2021-07-15

## 2021-07-15 RX ADMIN — HYDROCODONE BITARTRATE AND ACETAMINOPHEN 1 TABLET: 7.5; 325 TABLET ORAL at 12:32

## 2021-07-15 RX ADMIN — ONDANSETRON HYDROCHLORIDE 4 MG: 4 TABLET, FILM COATED ORAL at 12:32

## 2021-07-15 NOTE — DISCHARGE INSTRUCTIONS
Home to rest.  Maintain your very best hydration and nutrition.  Muscle relaxers together with ibuprofen 600 mg every 8 hours can be very helpful together with ice packs to the joints and warm compresses to your back muscles.  Use the Ace wrap for the next 3 days to help facilitate recovery.  No primary care provider is listed but you may call the [facility] connection number provided if you would like to establish care with a [facility] provider.  Thank you

## 2021-07-15 NOTE — ED PROVIDER NOTES
Subjective   Patient presents to the emergency department via EMS who was called to the scene of a local medical office where the patient was accompanying her .  Patient states she went to a standing position when her shoe was caught on the furnishings and she slowly fell forward with her head striking the sheet rock and plunging through the wall.  Patient complains of headache and neck pain with pain to the entire spine.  She localizes pain to her right shoulder, right wrist and right knee.  She has no neurosensory complaint or focal weakness.  She denies loss of consciousness or vomiting    Patient has past medical history of morbid obesity, type 2 diabetes, hyperlipidemia and hypertension.  She has had her medications today as usual      History provided by:  Patient and spouse   used: No    Fall  Mechanism of injury: fall    Injury location:  Head/neck  Head/neck injury location:  Head and scalp  Incident location: medical office   Time since incident:  1 hour  Arrived directly from scene: yes    Fall:     Point of impact:  Head  Suspicion of alcohol use: no    Suspicion of drug use: no    Prior to arrival data:     Bystander interventions:  None    Patient ambulatory at scene: yes      Blood loss:  None    Responsiveness at scene:  Alert    Orientation at scene:  Person, place, situation and time    Loss of consciousness: no      Amnesic to event: no      Airway interventions:  None    Breathing interventions:  None    IV access status:  None    IO access:  None    Fluids administered:  None    Cardiac interventions:  None    Medications administered:  None    Immobilization:  None    Airway condition since incident:  Stable    Breathing condition since incident:  Stable    Circulation condition since incident:  Stable    Mental status condition since incident:  Stable    Disability condition since incident:  Stable  Associated symptoms: back pain, headaches and neck pain     Associated symptoms: no abdominal pain, no blindness, no chest pain, no difficulty breathing, no hearing loss, no loss of consciousness and no vomiting    Risk factors: diabetes        Review of Systems   HENT: Negative for hearing loss.    Eyes: Negative for blindness.   Respiratory: Negative for shortness of breath.    Cardiovascular: Negative for chest pain and leg swelling.   Gastrointestinal: Negative for abdominal pain and vomiting.   Musculoskeletal: Positive for back pain and neck pain.   Neurological: Positive for headaches. Negative for loss of consciousness and weakness.   All other systems reviewed and are negative.      Past Medical History:   Diagnosis Date   • Diabetes mellitus (CMS/HCC)    • Hyperlipidemia    • Hypertension        Allergies   Allergen Reactions   • Latex Hives       Past Surgical History:   Procedure Laterality Date   • ADENOIDECTOMY     •  SECTION     • TONSILLECTOMY         History reviewed. No pertinent family history.    Social History     Socioeconomic History   • Marital status:      Spouse name: Not on file   • Number of children: Not on file   • Years of education: Not on file   • Highest education level: Not on file   Tobacco Use   • Smoking status: Never Smoker   • Smokeless tobacco: Never Used   Substance and Sexual Activity   • Alcohol use: No   • Drug use: Defer   • Sexual activity: Defer           Objective   Physical Exam  Vitals and nursing note reviewed.   Constitutional:       General: She is not in acute distress.     Appearance: Normal appearance. She is obese. She is not ill-appearing or toxic-appearing.      Comments: Patient is a good historian.  She is hypertensive.   HENT:      Head: Normocephalic and atraumatic.      Right Ear: External ear normal.      Left Ear: External ear normal.      Nose: Nose normal.      Mouth/Throat:      Mouth: Mucous membranes are moist.      Pharynx: No oropharyngeal exudate.   Eyes:      Conjunctiva/sclera:  Conjunctivae normal.   Neck:      Comments: Patient demonstrates full range of motion without limitation though this reproduces her presenting discomfort.  Patient also has tenderness to palpation of the paravertebral musculature.  Cardiovascular:      Rate and Rhythm: Normal rate and regular rhythm.      Heart sounds: No murmur heard.     Pulmonary:      Effort: Pulmonary effort is normal. No respiratory distress.      Breath sounds: Normal breath sounds.   Abdominal:      General: Bowel sounds are normal. There is no distension.      Palpations: Abdomen is soft.      Tenderness: There is no abdominal tenderness.   Musculoskeletal:         General: No swelling, tenderness or deformity. Normal range of motion.      Cervical back: Normal range of motion and neck supple. No muscular tenderness.      Comments: Patient complains of palpation to the entire spine with palpation.  Patient's demonstration of pain is out of proportion of physical findings.  Pelvis is stable.  Straight leg raise is negative bilaterally.  She has 5 out of 5 strength in lower and upper extremities.  Right knee is without laxity x4.  There is no effusion.  There is no ballottement.  Upper extremities: Left upper extremity is atraumatic and unremarkable.  Patient localizes discomfort at the right wrist and right shoulder.  No gross deformities appreciated.  Range of motion is normal but this reproduces presenting pain.  Distal joints are negative neurovascular exams are negative   Skin:     General: Skin is warm and dry.      Capillary Refill: Capillary refill takes less than 2 seconds.      Coloration: Skin is not pale.      Findings: No lesion.   Neurological:      General: No focal deficit present.      Mental Status: She is alert and oriented to person, place, and time.      Comments: No Neurosensory deficit or focal weakness     Psychiatric:         Behavior: Behavior normal.         Thought Content: Thought content normal.          Procedures           ED Course  ED Course as of Jul 15 1410   Thu Jul 15, 2021   1406 Patient's work-up is negative for acute findings.  She has had some pain relief with administered medications.  Her vital signs have been stable throughout her ED course.  Ace wrap's of been applied to her right wrist and right knee.  We discussed parameters for concern that would warrant return to the emergency department; patient and her  understand and concur with his outpatient plan of care and close follow-up    [MS]      ED Course User Index  [MS] PapoLili scott, MARK            No results found for this or any previous visit (from the past 24 hour(s)).  Note: In addition to lab results from this visit, the labs listed above may include labs taken at another facility or during a different encounter within the last 24 hours. Please correlate lab times with ED admission and discharge times for further clarification of the services performed during this visit.    XR Wrist 3+ View Right   Preliminary Result   No acute osseous findings of the right wrist specifically,   no acute fracture with carpal rows preserved.       D:  07/15/2021   E:  07/15/2021                  XR Shoulder 2+ View Right   Preliminary Result   No acute osseous findings of the right shoulder with   mild-to-moderate degenerative changes noted.       D:  07/15/2021   E:  07/15/2021                  XR Spine Thoracic 3 View   Preliminary Result   No acute displaced fracture of the cervical, thoracic or   lumbar spine with degenerative changes throughout.       D:  07/15/2021   E:  07/15/2021                  XR Spine Lumbar 2 or 3 View   Preliminary Result   No acute displaced fracture of the cervical, thoracic or   lumbar spine with degenerative changes throughout.       D:  07/15/2021   E:  07/15/2021                  XR Knee 1 or 2 View Right   Preliminary Result   No acute osseous findings of the right knee with   degenerative changes  "and postsurgical changes again noted and stable   from 2019.       D:  07/15/2021   E:  07/15/2021                  XR Spine Cervical 2 View   Preliminary Result   No acute displaced fracture of the cervical, thoracic or   lumbar spine with degenerative changes throughout.       D:  07/15/2021   E:  07/15/2021                  CT Head Without Contrast   Preliminary Result   No acute intracranial abnormality.       D:  07/15/2021   E:  07/15/2021                Vitals:    07/15/21 1145 07/15/21 1200 07/15/21 1220 07/15/21 1240   BP: (!) 178/102 151/89 145/79 134/74   Pulse: 65 63 64 61   Resp: 16      Temp: 99 °F (37.2 °C)      TempSrc: Oral      SpO2: 95% 94% 91% 93%   Weight: (!) 149 kg (329 lb)      Height: 177.8 cm (70\")        Medications   HYDROcodone-acetaminophen (NORCO) 7.5-325 MG per tablet 1 tablet (1 tablet Oral Given 7/15/21 1232)   ondansetron (ZOFRAN) tablet 4 mg (4 mg Oral Given 7/15/21 1232)     ECG/EMG Results (last 24 hours)     ** No results found for the last 24 hours. **        No orders to display                                         MDM    Final diagnoses:   Contusion of scalp, initial encounter   Contusion of right shoulder, initial encounter   Knee strain, right, initial encounter   Muscle strain of right wrist, initial encounter   Acute cervical myofascial strain, initial encounter   Strain of lumbar region, initial encounter       ED Disposition  ED Disposition     ED Disposition Condition Comment    Discharge Stable           PATIENT CONNECTION - [facility] [id number]  [phone]             Medication List      New Prescriptions    methocarbamol 750 MG tablet  Commonly known as: ROBAXIN  Take 1 tablet by mouth 3 (Three) Times a Day As Needed for Muscle Spasms.           Where to Get Your Medications      These medications were sent to [facility] [address] [place] [address] - [address] [facility] [phone] [facility] [phone]   [address] [facility] " [address]    Phone: [phone]   · methocarbamol 750 MG tablet          Lili Barros, APRN  07/20/21 0737

## 2023-04-24 ENCOUNTER — OFFICE VISIT (OUTPATIENT)
Dept: INTERNAL MEDICINE | Facility: CLINIC | Age: 58
End: 2023-04-24
Payer: MEDICAID

## 2023-04-24 VITALS
BODY MASS INDEX: 42.95 KG/M2 | RESPIRATION RATE: 22 BRPM | DIASTOLIC BLOOD PRESSURE: 84 MMHG | SYSTOLIC BLOOD PRESSURE: 142 MMHG | TEMPERATURE: 97.5 F | HEIGHT: 69 IN | HEART RATE: 68 BPM | WEIGHT: 290 LBS

## 2023-04-24 DIAGNOSIS — E78.5 HYPERLIPIDEMIA LDL GOAL <70: ICD-10-CM

## 2023-04-24 DIAGNOSIS — E11.43 TYPE 2 DIABETES MELLITUS WITH DIABETIC AUTONOMIC NEUROPATHY, WITH LONG-TERM CURRENT USE OF INSULIN: ICD-10-CM

## 2023-04-24 DIAGNOSIS — F33.1 MODERATE EPISODE OF RECURRENT MAJOR DEPRESSIVE DISORDER: ICD-10-CM

## 2023-04-24 DIAGNOSIS — I25.708 CORONARY ARTERY DISEASE OF BYPASS GRAFT OF NATIVE HEART WITH STABLE ANGINA PECTORIS: Primary | ICD-10-CM

## 2023-04-24 DIAGNOSIS — I10 PRIMARY HYPERTENSION: ICD-10-CM

## 2023-04-24 DIAGNOSIS — Z79.4 TYPE 2 DIABETES MELLITUS WITH DIABETIC AUTONOMIC NEUROPATHY, WITH LONG-TERM CURRENT USE OF INSULIN: ICD-10-CM

## 2023-04-24 PROCEDURE — 1160F RVW MEDS BY RX/DR IN RCRD: CPT | Performed by: NURSE PRACTITIONER

## 2023-04-24 PROCEDURE — 1159F MED LIST DOCD IN RCRD: CPT | Performed by: NURSE PRACTITIONER

## 2023-04-24 PROCEDURE — 99204 OFFICE O/P NEW MOD 45 MIN: CPT | Performed by: NURSE PRACTITIONER

## 2023-04-24 RX ORDER — PANTOPRAZOLE SODIUM 40 MG/1
40 TABLET, DELAYED RELEASE ORAL DAILY
COMMUNITY

## 2023-04-24 RX ORDER — CLOPIDOGREL BISULFATE 75 MG/1
75 TABLET ORAL DAILY
COMMUNITY

## 2023-04-24 RX ORDER — PEN NEEDLE, DIABETIC 30 GX3/16"
1 NEEDLE, DISPOSABLE MISCELLANEOUS 2 TIMES DAILY
Qty: 100 EACH | Refills: 5 | Status: SHIPPED | OUTPATIENT
Start: 2023-04-24

## 2023-04-24 RX ORDER — PAROXETINE HYDROCHLORIDE 40 MG/1
40 TABLET, FILM COATED ORAL EVERY MORNING
COMMUNITY

## 2023-04-24 RX ORDER — INSULIN GLARGINE 100 [IU]/ML
40 INJECTION, SOLUTION SUBCUTANEOUS 2 TIMES DAILY
COMMUNITY

## 2023-04-24 RX ORDER — GLIPIZIDE 10 MG/1
10 TABLET ORAL
COMMUNITY

## 2023-04-24 RX ORDER — DILTIAZEM HYDROCHLORIDE 120 MG/1
120 TABLET, FILM COATED ORAL 2 TIMES DAILY
COMMUNITY

## 2023-04-24 RX ORDER — FENOFIBRATE 145 MG/1
145 TABLET, COATED ORAL DAILY
COMMUNITY

## 2023-04-24 RX ORDER — ATORVASTATIN CALCIUM 80 MG/1
80 TABLET, FILM COATED ORAL DAILY
COMMUNITY

## 2023-04-24 RX ORDER — ASPIRIN 81 MG/1
81 TABLET ORAL DAILY
COMMUNITY

## 2023-04-24 NOTE — PROGRESS NOTES
"Patient Name: Estefani Blancas  : 1965   MRN: [id number]     Chief Complaint:    Chief Complaint   Patient presents with   • heart attack     New patient, follow up from heart attack 23 went to [facility], needs cardiologist referral   • lung nodules     Needs pulmonology referral       History of Present Illness: Estefani Blancas is a 57 y.o. female presents to clinic to establish care.    Post CABG care.  The patient reports a  maker heart attack 2023 followed by a CABG on 2022 which was performed by Dr. Palomino. She reports a failed heart catheterization and notes she was informed by Dr. Perez that her arteries were \"as thin as dental floss.\" She admits that she continues to feel fatigued after surgery but states she is trying to walk more to increase her stamina and energy. She reports insomnia since surgery and notes she has not felt like herself. The patient was placed in cardiac rehab but has not begun. She started an assessment at [facility] but has not yet received a call to continue care. She is no longer followed by her cardiothoracic surgeon and continues to be followed by cardiology. She was last seen by cardiology on 2023.  She assists with ADLs for the elderly and was advised not tug, pull, or stretch. She will follow-up with cardiology in 2023. She is no longer seeing Dr. Perez due to insurance coverage. She reports intermittent chest pain that primarily occurs with coughing and sneezing. She is no longer taking amlodipine. She reports her surgical site is almost healed outside of one area that became infected. She reports dyspnea prior to and after her heart attack.    Pulmonary nodules.   The patient was informed she has pulmonary nodules and advised to see a pulmonologist. She denies smoking cigarettes.     Diabetes.   While in the hospital the patient had a reported HbA1c of 13.9. She was placed on Basaglar insulin for the first " time. She was informed by her previous cardiologist she was supposed to discontinue the insulin after 30 days. She continues to take 40 units 2 times daily and admits she was not instructed to discontinue use. The patient reports her blood sugars have decreased from 450 mg/dL and now range between 150 and 200 mg/dL since starting insulin. She denies low blood sugar readings. She was prescribed glipizide by Dr. Laws prior to beginning insulin. She admits taking Januvia and Jardiance in the past with positive effect. She has since discontinued use due to insurance non-coverage. She continues to take Glucotrol and has discontinued metformin. She reports her lowest blood sugar reading was 177 mg/dL. She reports neuropathy in her feet.     Depression.   The patient is taking Paxil for depression. She adds she experiences hot flashes and Paxil does not help.     GERD.   She has discontinued pantoprazole and denies symptoms outside of flatulence. She admits she has never experienced flatulence prior to having surgery. She reports having an endoscopy 7 to 8 years ago due to severe heartburn. She notes inflammation was noted at that time.       The patient has been experiencing edema in her right lower leg.    She recently applied for disability and reports her past medical history includes diabetes, back discomfort, and 3 episodes of COVID-19.      The patient requests a refill on insulin needles.     She reports a paternal history of death before 60-years-old.        Subjective     Review of System: Review of Systems   A review of systems was performed, and the pertinent positives are noted in the HPI.      Medications:     Current Outpatient Medications:   •  ACETAMINOPHEN 8 HOUR PO, Take  by mouth., Disp: , Rfl:   •  aspirin 81 MG EC tablet, Take 1 tablet by mouth Daily., Disp: , Rfl:   •  atorvastatin (LIPITOR) 80 MG tablet, Take 1 tablet by mouth Daily., Disp: , Rfl:   •  clopidogrel (PLAVIX) 75 MG tablet, Take 1  "tablet by mouth Daily., Disp: , Rfl:   •  dilTIAZem (CARDIZEM) 120 MG tablet, Take 1 tablet by mouth 4 (Four) Times a Day., Disp: , Rfl:   •  fenofibrate (TRICOR) 145 MG tablet, Take 1 tablet by mouth Daily., Disp: , Rfl:   •  glipizide (GLUCOTROL) 10 MG tablet, Take 1 tablet by mouth 2 (Two) Times a Day Before Meals., Disp: , Rfl:   •  Insulin Glargine (BASAGLAR KWIKPEN) 100 UNIT/ML injection pen, Inject 40 Units under the skin into the appropriate area as directed 2 (Two) Times a Day., Disp: , Rfl:   •  metoprolol tartrate (LOPRESSOR) 25 MG tablet, Take 1 tablet by mouth 2 (Two) Times a Day., Disp: , Rfl:   •  Metoprolol-Hydrochlorothiazide (METOPROLOL-HCTZ ER PO), Take  by mouth., Disp: , Rfl:   •  pantoprazole (PROTONIX) 40 MG EC tablet, Take 1 tablet by mouth Daily., Disp: , Rfl:   •  PARoxetine (PAXIL) 40 MG tablet, Take 1 tablet by mouth Every Morning., Disp: , Rfl:   •  Insulin Pen Needle (Pen Needles) 31G X 8 MM misc, 1 each 2 (Two) Times a Day. For Diabetes: E11.43, Disp: 100 each, Rfl: 5  •  Misc Natural Products (ESTROVEN ENERGY) tablet, Take  by mouth., Disp: , Rfl:   •  PARoxetine HCl (PAXIL PO), Take  by mouth., Disp: , Rfl:     Allergies:   Allergies   Allergen Reactions   • Benazepril-Hydrochlorothiazide Cough     Other reaction(s): Cough   • Latex Hives       Objective     Physical Exam:   Vital Signs:   Vitals:    04/24/23 1340   BP: 142/84   BP Location: Right arm   Patient Position: Sitting   Cuff Size: Adult   Pulse: 68   Resp: 22   Temp: 97.5 °F (36.4 °C)   TempSrc: Infrared   Weight: 132 kg (290 lb)   Height: 175.3 cm (69\")   PainSc:   2     Body mass index is 42.83 kg/m². Class 3 Severe Obesity (BMI >=40). Obesity-related health conditions include the following: hypertension, coronary heart disease, diabetes mellitus, dyslipidemias and GERD. Obesity is newly identified. BMI is is above average; BMI management plan is completed. Information provided on after visit summary.      Physical " Exam  Constitutional:       General: She is not in acute distress.     Appearance: She is not ill-appearing.   HENT:      Head: Normocephalic.   Cardiovascular:      Rate and Rhythm: Normal rate and regular rhythm.      Heart sounds: Normal heart sounds. No murmur heard.  Pulmonary:      Breath sounds: Normal breath sounds.   Abdominal:      General: Bowel sounds are normal.      Tenderness: There is no abdominal tenderness.   Musculoskeletal:      Right lower leg: Edema (trace) present.      Left lower leg: Edema (trace) present.   Skin:     Comments: Healing midsternal incision with small amount erythema on the lower incision   Neurological:      General: No focal deficit present.      Mental Status: She is oriented to person, place, and time.   Psychiatric:         Mood and Affect: Mood normal.         Assessment / Plan      Assessment/Plan:   Diagnoses and all orders for this visit:    1. 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 Type 2 diabetes mellitus with diabetic autonomic neuropathy, with long-term current use of insulin  -     Ambulatory Referral to Endocrinology    3. Primary hypertension    4. Hyperlipidemia LDL goal <70    5. Moderate episode of recurrent major depressive disorder    Other orders  -     Insulin Pen Needle (Pen Needles) 31G X 8 MM misc; 1 each 2 (Two) Times a Day. For Diabetes: E11.43  Dispense: 100 each; Refill: 5    1. Post CABG care.  The patient will contact her insurance company and ask if cardiac rehab is covered.    2. History of pulmonary nodules.   The patient has been referred to the pulmonary nodule clinic.    3. Diabetes mellitus  The patient has been referred to endocrinology.    We will repeat labs in 06/2023.    She will follow-up in 2 months for a physical exam.    Follow Up:   Return in about 2 months (around 6/24/2023) for Annual.    MARK Estrada  [facility]  Transcribed from ambient dictation for MARK Estrada by Daisy Browne.  04/24/23   18:07 EDT    Patient or patient representative verbalized consent to the visit recording.  I have personally performed the services described in this document as transcribed by the above individual, and it is both accurate and complete.

## 2023-04-24 NOTE — PATIENT INSTRUCTIONS
MyPlate from USDA  MyPlate is an outline of a general healthy diet based on the Dietary Guidelines for Americans, 4664-0673, from the U.S. Department of Agriculture (USDA). It sets guidelines for how much food you should eat from each food group based on your age, sex, and level of physical activity.  What are tips for following MyPlate?  To follow MyPlate recommendations:  Eat a wide variety of fruits and vegetables, grains, and protein foods.  Serve smaller portions and eat less food throughout the day.  Limit portion sizes to avoid overeating.  Enjoy your food.  Get at least 150 minutes of exercise every week. This is about 30 minutes each day, 5 or more days per week.  It can be difficult to have every meal look like MyPlate. Think about MyPlate as eating guidelines for an entire day, rather than each individual meal.  Fruits and vegetables  Make one half of your plate fruits and vegetables.  Eat many different colors of fruits and vegetables each day.  For a 2,000-calorie daily food plan, eat:  2½ cups of vegetables every day.  2 cups of fruit every day.  1 cup is equal to:  1 cup raw or cooked vegetables.  1 cup raw fruit.  1 medium-sized orange, apple, or banana.  1 cup 100% fruit or vegetable juice.  2 cups raw leafy greens, such as lettuce, spinach, or kale.  ½ cup dried fruit.  Grains  One fourth of your plate should be grains.  Make at least half of the grains you eat each day whole grains.  For a 2,000-calorie daily food plan, eat 6 oz of grains every day.  1 oz is equal to:  1 slice bread.  1 cup cereal.  ½ cup cooked rice, cereal, or pasta.  Protein  One fourth of your plate should be protein.  Eat a wide variety of protein foods, including meat, poultry, fish, eggs, beans, nuts, and tofu.  For a 2,000-calorie daily food plan, eat 5½ oz of protein every day.  1 oz is equal to:  1 oz meat, poultry, or fish.  ¼ cup cooked beans.  1 egg.  ½ oz nuts or seeds.  1 Tbsp peanut butter.  Dairy  Drink fat-free  or low-fat (1%) milk.  Eat or drink dairy as a side to meals.  For a 2,000-calorie daily food plan, eat or drink 3 cups of dairy every day.  1 cup is equal to:  1 cup milk, yogurt, cottage cheese, or soy milk (soy beverage).  2 oz processed cheese.  1½ oz natural cheese.  Fats, oils, salt, and sugars  Only small amounts of oils are recommended.  Avoid foods that are high in calories and low in nutritional value (empty calories), like foods high in fat or added sugars.  Choose foods that are low in salt (sodium). Choose foods that have less than 140 milligrams (mg) of sodium per serving.  Drink water instead of sugary drinks. Drink enough fluid to keep your urine pale yellow.  Where to find support  Work with your health care provider or a dietitian to develop a customized eating plan that is right for you.  Download an courtney (mobile application) to help you track your daily food intake.  Where to find more information  USDA: ChooseMyPlate.gov  Summary  MyPlate is a general guideline for healthy eating from the USDA. It is based on the Dietary Guidelines for Americans, 2609-7716.  In general, fruits and vegetables should take up one half of your plate, grains should take up one fourth of your plate, and protein should take up one fourth of your plate.  This information is not intended to replace advice given to you by your health care provider. Make sure you discuss any questions you have with your health care provider.  Document Revised: 11/08/2021 Document Reviewed: 11/08/2021  [employer] Patient Education © 2022 [employer]

## 2023-04-27 ENCOUNTER — PATIENT ROUNDING (BHMG ONLY) (OUTPATIENT)
Dept: INTERNAL MEDICINE | Facility: CLINIC | Age: 58
End: 2023-04-27
Payer: MEDICAID

## 2023-04-27 ENCOUNTER — OFFICE VISIT (OUTPATIENT)
Dept: CARDIOLOGY | Facility: CLINIC | Age: 58
End: 2023-04-27
Payer: MEDICAID

## 2023-04-27 VITALS
WEIGHT: 284 LBS | SYSTOLIC BLOOD PRESSURE: 110 MMHG | HEART RATE: 57 BPM | BODY MASS INDEX: 40.66 KG/M2 | DIASTOLIC BLOOD PRESSURE: 62 MMHG | HEIGHT: 70 IN | OXYGEN SATURATION: 98 %

## 2023-04-27 DIAGNOSIS — I10 PRIMARY HYPERTENSION: ICD-10-CM

## 2023-04-27 DIAGNOSIS — E11.9 TYPE 2 DIABETES MELLITUS WITHOUT COMPLICATION, UNSPECIFIED WHETHER LONG TERM INSULIN USE: ICD-10-CM

## 2023-04-27 DIAGNOSIS — E66.2 CLASS 3 OBESITY WITH ALVEOLAR HYPOVENTILATION, SERIOUS COMORBIDITY, AND BODY MASS INDEX (BMI) OF 40.0 TO 44.9 IN ADULT: ICD-10-CM

## 2023-04-27 DIAGNOSIS — E78.2 MIXED HYPERLIPIDEMIA: ICD-10-CM

## 2023-04-27 DIAGNOSIS — I25.118 CORONARY ARTERY DISEASE OF NATIVE ARTERY OF NATIVE HEART WITH STABLE ANGINA PECTORIS: Primary | ICD-10-CM

## 2023-04-27 PROBLEM — E66.813 CLASS 3 OBESITY WITH ALVEOLAR HYPOVENTILATION AND BODY MASS INDEX (BMI) OF 40.0 TO 44.9 IN ADULT: Status: ACTIVE | Noted: 2023-03-02

## 2023-04-27 PROBLEM — I21.9 HEART ATTACK: Status: ACTIVE | Noted: 2023-03-02

## 2023-04-27 NOTE — PROGRESS NOTES
OFFICE VISIT  NOTE  [facility] CARDIOLOGY MAIN CAMPUS      Name: Estefani Blancas    Date: 2023  MRN:  [id number]  :  1965      REFERRING/PRIMARY PROVIDER:  Leeanna Tovar APRN    Chief Complaint   Patient presents with   • Establish Care   • Coronary Artery Disease       HPI: Estefani Blancas is a 57 y.o. female who presents today for CAD.  Myocardial infarction with subsequent 2V CABG 3/7/2023 at [facility] by Dr. Arellano, used free radial and LIMA.  Doing better since discharge no significant chest pain she does have some positional chest pain and pleuritic type chest pain but nothing worsening.  Denies significant shortness of breath.  Waiting on insurance approval for cardiac rehab.    Past Medical History:   Diagnosis Date   • Diabetes mellitus    • Heart attack 2023   • Hyperlipidemia    • Hypertension        Past Surgical History:   Procedure Laterality Date   • ADENOIDECTOMY     • CARDIAC SURGERY  2023   •  SECTION     • TONSILLECTOMY         Social History     Socioeconomic History   • Marital status:    Tobacco Use   • Smoking status: Never   • Smokeless tobacco: Never   Vaping Use   • Vaping Use: Never used   Substance and Sexual Activity   • Alcohol use: No   • Drug use: Defer   • Sexual activity: Defer       Family History   Problem Relation Age of Onset   • Heart attack Mother    • Heart attack Father    • Breast cancer Sister    • Lung cancer Paternal Aunt    • Heart attack Maternal Grandmother    • Heart attack Paternal Grandmother    • Heart attack Paternal Grandfather         ROS:   Constitutional no fever,  no weight loss   Skin no rash, no subcutaneous nodules   Otolaryngeal no difficulty swallowing   Cardiovascular See HPI   Pulmonary no cough, no sputum production   Gastrointestinal no constipation, no diarrhea   Genitourinary no dysuria, no hematuria   Hematologic no easy bruisability, no abnormal bleeding   Musculoskeletal no muscle  "pain   Neurologic no dizziness, no falls         Allergies   Allergen Reactions   • Benazepril-Hydrochlorothiazide Cough     Other reaction(s): Cough   • Latex Hives         Current Outpatient Medications:   •  ACETAMINOPHEN 8 HOUR PO, Take  by mouth., Disp: , Rfl:   •  aspirin 81 MG EC tablet, Take 1 tablet by mouth Daily., Disp: , Rfl:   •  atorvastatin (LIPITOR) 80 MG tablet, Take 1 tablet by mouth Daily., Disp: , Rfl:   •  clopidogrel (PLAVIX) 75 MG tablet, Take 1 tablet by mouth Daily., Disp: , Rfl:   •  dilTIAZem (CARDIZEM) 120 MG tablet, Take 1 tablet by mouth 2 (Two) Times a Day., Disp: , Rfl:   •  fenofibrate (TRICOR) 145 MG tablet, Take 1 tablet by mouth Daily., Disp: , Rfl:   •  glipizide (GLUCOTROL) 10 MG tablet, Take 1 tablet by mouth 2 (Two) Times a Day Before Meals., Disp: , Rfl:   •  Insulin Glargine (BASAGLAR KWIKPEN) 100 UNIT/ML injection pen, Inject 40 Units under the skin into the appropriate area as directed 2 (Two) Times a Day., Disp: , Rfl:   •  Insulin Pen Needle (Pen Needles) 31G X 8 MM misc, 1 each 2 (Two) Times a Day. For Diabetes: E11.43, Disp: 100 each, Rfl: 5  •  metoprolol tartrate (LOPRESSOR) 25 MG tablet, Take 1 tablet by mouth 2 (Two) Times a Day., Disp: , Rfl:   •  pantoprazole (PROTONIX) 40 MG EC tablet, Take 1 tablet by mouth Daily., Disp: , Rfl:   •  PARoxetine (PAXIL) 40 MG tablet, Take 1 tablet by mouth Every Morning., Disp: , Rfl:     Vitals:    04/27/23 1140   BP: 110/62   BP Location: Left arm   Patient Position: Sitting   Pulse: 57   SpO2: 98%   Weight: 129 kg (284 lb)   Height: 177.8 cm (70\")     Body mass index is 40.75 kg/m².    PHYSICAL EXAM:    General Appearance:   · well developed  · well nourished  HENT:   · oropharynx moist  · lips not cyanotic  Neck:  · thyroid not enlarged  · supple  Respiratory:  · no respiratory distress  · normal breath sounds  · no rales  Cardiovascular:  · no jugular venous distention  · regular rhythm  · apical impulse normal  · S1 normal, " S2 normal  · no S3, no S4   · no murmur  · no rub, no thrill  · carotid pulses normal; no bruit  · lower extremity edema: none      Musculoskeletal:  · no clubbing of fingers.   · normocephalic, head atraumatic  Skin:   · warm, dry  Psychiatric:  · judgement and insight appropriate  · normal mood and affect    RESULTS:     ECG 12 Lead    Date/Time: 4/27/2023 11:59 AM  Performed by: Saeed Temple MD  Authorized by: Saeed Temple MD   Comparison: not compared with previous ECG   Previous ECG: no previous ECG available  Rhythm: sinus rhythm  Rate: normal  QRS axis: normal    Clinical impression: non-specific ECG                  Labs:  No results found for: CHOL, TRIG, HDL, LDL, AST, ALT  No results found for: HGBA1C  No components found for: CREATINININE  No results found for: EGFRIFNONA    Most recent PCP note, imaging tests, and labs reviewed.    ASSESSMENT:  Problem List Items Addressed This Visit        Cardiac and Vasculature    Coronary artery disease of native artery of native heart with stable angina pectoris - Primary    Overview     3/7/23: 2V cabg with free radial and lewis at [facility] dr. Palomino.         Hypertension       Sleep    Class 3 obesity with alveolar hypoventilation and body mass index (BMI) of 40.0 to 44.9 in adult   Other Visit Diagnoses     Mixed hyperlipidemia              PLAN:    1.  CAD:  Status post CABG 3/7/2023  Requesting records  Continue aspirin, Plavix, high intensity statin therapy and beta-blocker  Cardiac rehab referral  Work restrictions per surgeon    2.  Primary hypertension:  Goal blood pressure less than 130/80  Continue current medical therapy, fairly well-controlled today  Low-sodium diet and exercise recommended    3.  Hyperlipidemia:  Continue atorvastatin 80 mg daily  Goal to less than 70  Check lipids in 3-6 months  Low saturated fat diet    4.  Uncontrolled diabetes mellitus type 2:  Refer to endocrinology    5.  Obesity:  BMI 40  Discussed importance of  decreased caloric intake and increase aerobic exercise  Consider GLP-1 agonist and/or Jardiance    Return to clinic in 6 months, or sooner as needed.    Thank you for the opportunity to share in the care of your patient; please do not hesitate to call me with any questions.     Saeed Temple MD, [facility], [facility]  Office: [phone] [address] [place] [address]    04/27/23

## 2023-04-27 NOTE — PROGRESS NOTES
A [employer] message has been sent to the patient for patient rounding [place] [facility] [place]

## 2023-04-27 NOTE — PROGRESS NOTES
A [employer] message has been sent to the patient for [place] [facility] [place]

## 2023-05-01 LAB
PH UR STRIP.AUTO: 5.5 [PH] (ref 5–8)
PROT UR QL STRIP: NEGATIVE
SPECIFIC GRAVITY: 1.01

## 2023-05-02 ENCOUNTER — APPOINTMENT (OUTPATIENT)
Dept: GENERAL RADIOLOGY | Facility: HOSPITAL | Age: 58
DRG: 247 | End: 2023-05-02
Payer: MEDICAID

## 2023-05-02 ENCOUNTER — APPOINTMENT (OUTPATIENT)
Dept: CARDIOLOGY | Facility: HOSPITAL | Age: 58
DRG: 247 | End: 2023-05-02
Payer: MEDICAID

## 2023-05-02 ENCOUNTER — HOSPITAL ENCOUNTER (INPATIENT)
Facility: HOSPITAL | Age: 58
LOS: 2 days | Discharge: HOME | DRG: 247 | End: 2023-05-04
Attending: INTERNAL MEDICINE | Admitting: INTERNAL MEDICINE
Payer: MEDICAID

## 2023-05-02 ENCOUNTER — TELEPHONE (OUTPATIENT)
Dept: INTERNAL MEDICINE | Facility: CLINIC | Age: 58
End: 2023-05-02
Payer: MEDICAID

## 2023-05-02 DIAGNOSIS — R91.1 RIGHT MIDDLE LOBE PULMONARY NODULE: Primary | ICD-10-CM

## 2023-05-02 DIAGNOSIS — J90 PLEURAL EFFUSION, BILATERAL: ICD-10-CM

## 2023-05-02 DIAGNOSIS — I25.118 CORONARY ARTERY DISEASE OF NATIVE ARTERY OF NATIVE HEART WITH STABLE ANGINA PECTORIS: Primary | ICD-10-CM

## 2023-05-02 DIAGNOSIS — I10 PRIMARY HYPERTENSION: ICD-10-CM

## 2023-05-02 PROBLEM — I21.21 STEMI INVOLVING LEFT CIRCUMFLEX CORONARY ARTERY: Status: ACTIVE | Noted: 2023-05-02

## 2023-05-02 LAB
ACT BLD: 233 SECONDS (ref 82–152)
ACT BLD: 239 SECONDS (ref 82–152)
ACT BLD: 263 SECONDS (ref 82–152)
ACT BLD: 311 SECONDS (ref 82–152)
ALBUMIN SERPL-MCNC: 3.9 G/DL (ref 3.5–5.2)
ALBUMIN/GLOB SERPL: 1.4 G/DL
ALP SERPL-CCNC: 90 U/L (ref 39–117)
ALT SERPL W P-5'-P-CCNC: 17 U/L (ref 1–33)
ANION GAP SERPL CALCULATED.3IONS-SCNC: 13 MMOL/L (ref 5–15)
AST SERPL-CCNC: 15 U/L (ref 1–32)
BASOPHILS # BLD AUTO: 0.04 10*3/MM3 (ref 0–0.2)
BASOPHILS NFR BLD AUTO: 0.3 % (ref 0–1.5)
BILIRUB SERPL-MCNC: 0.5 MG/DL (ref 0–1.2)
BUN SERPL-MCNC: 16 MG/DL (ref 6–20)
BUN/CREAT SERPL: 27.6 (ref 7–25)
CALCIUM SPEC-SCNC: 8.9 MG/DL (ref 8.6–10.5)
CHLORIDE SERPL-SCNC: 101 MMOL/L (ref 98–107)
CO2 SERPL-SCNC: 20 MMOL/L (ref 22–29)
CREAT BLDA-MCNC: 0.4 MG/DL (ref 0.6–1.3)
CREAT SERPL-MCNC: 0.58 MG/DL (ref 0.57–1)
DEPRECATED RDW RBC AUTO: 44.1 FL (ref 37–54)
EGFRCR SERPLBLD CKD-EPI 2021: 107 ML/MIN/1.73
EOSINOPHIL # BLD AUTO: 0.17 10*3/MM3 (ref 0–0.4)
EOSINOPHIL NFR BLD AUTO: 1.4 % (ref 0.3–6.2)
ERYTHROCYTE [DISTWIDTH] IN BLOOD BY AUTOMATED COUNT: 14.6 % (ref 12.3–15.4)
GEN 5 2HR TROPONIN T REFLEX: 19 NG/L
GLOBULIN UR ELPH-MCNC: 2.8 GM/DL
GLUCOSE BLDC GLUCOMTR-MCNC: 115 MG/DL (ref 70–130)
GLUCOSE BLDC GLUCOMTR-MCNC: 193 MG/DL (ref 70–130)
GLUCOSE SERPL-MCNC: 190 MG/DL (ref 65–99)
HCT VFR BLD AUTO: 41.1 % (ref 34–46.6)
HGB BLD-MCNC: 13.1 G/DL (ref 12–15.9)
IMM GRANULOCYTES # BLD AUTO: 0.06 10*3/MM3 (ref 0–0.05)
IMM GRANULOCYTES NFR BLD AUTO: 0.5 % (ref 0–0.5)
LYMPHOCYTES # BLD AUTO: 1.96 10*3/MM3 (ref 0.7–3.1)
LYMPHOCYTES NFR BLD AUTO: 15.6 % (ref 19.6–45.3)
MAGNESIUM SERPL-MCNC: 1.5 MG/DL (ref 1.6–2.6)
MCH RBC QN AUTO: 26.3 PG (ref 26.6–33)
MCHC RBC AUTO-ENTMCNC: 31.9 G/DL (ref 31.5–35.7)
MCV RBC AUTO: 82.5 FL (ref 79–97)
MONOCYTES # BLD AUTO: 0.94 10*3/MM3 (ref 0.1–0.9)
MONOCYTES NFR BLD AUTO: 7.5 % (ref 5–12)
NEUTROPHILS NFR BLD AUTO: 74.7 % (ref 42.7–76)
NEUTROPHILS NFR BLD AUTO: 9.4 10*3/MM3 (ref 1.7–7)
NRBC BLD AUTO-RTO: 0 /100 WBC (ref 0–0.2)
NT-PROBNP SERPL-MCNC: 138.4 PG/ML (ref 0–900)
PLATELET # BLD AUTO: 313 10*3/MM3 (ref 140–450)
PMV BLD AUTO: 10.8 FL (ref 6–12)
POTASSIUM SERPL-SCNC: 3.9 MMOL/L (ref 3.5–5.2)
PROT SERPL-MCNC: 6.7 G/DL (ref 6–8.5)
RBC # BLD AUTO: 4.98 10*6/MM3 (ref 3.77–5.28)
SODIUM SERPL-SCNC: 134 MMOL/L (ref 136–145)
TROPONIN T DELTA: 12 NG/L
TROPONIN T SERPL HS-MCNC: 7 NG/L
WBC NRBC COR # BLD: 12.57 10*3/MM3 (ref 3.4–10.8)

## 2023-05-02 PROCEDURE — C1894 INTRO/SHEATH, NON-LASER: HCPCS | Performed by: INTERNAL MEDICINE

## 2023-05-02 PROCEDURE — 93459 L HRT ART/GRFT ANGIO: CPT | Performed by: INTERNAL MEDICINE

## 2023-05-02 PROCEDURE — C1887 CATHETER, GUIDING: HCPCS | Performed by: INTERNAL MEDICINE

## 2023-05-02 PROCEDURE — C1769 GUIDE WIRE: HCPCS | Performed by: INTERNAL MEDICINE

## 2023-05-02 PROCEDURE — 25010000002 HEPARIN (PORCINE) PER 1000 UNITS: Performed by: INTERNAL MEDICINE

## 2023-05-02 PROCEDURE — 25010000002 ONDANSETRON PER 1 MG: Performed by: INTERNAL MEDICINE

## 2023-05-02 PROCEDURE — 93005 ELECTROCARDIOGRAM TRACING: CPT | Performed by: INTERNAL MEDICINE

## 2023-05-02 PROCEDURE — 82948 REAGENT STRIP/BLOOD GLUCOSE: CPT

## 2023-05-02 PROCEDURE — 93306 TTE W/DOPPLER COMPLETE: CPT | Performed by: INTERNAL MEDICINE

## 2023-05-02 PROCEDURE — C1725 CATH, TRANSLUMIN NON-LASER: HCPCS | Performed by: INTERNAL MEDICINE

## 2023-05-02 PROCEDURE — 93971 EXTREMITY STUDY: CPT | Performed by: INTERNAL MEDICINE

## 2023-05-02 PROCEDURE — 71045 X-RAY EXAM CHEST 1 VIEW: CPT

## 2023-05-02 PROCEDURE — 93571 IV DOP VEL&/PRESS C FLO 1ST: CPT | Performed by: INTERNAL MEDICINE

## 2023-05-02 PROCEDURE — C1874 STENT, COATED/COV W/DEL SYS: HCPCS | Performed by: INTERNAL MEDICINE

## 2023-05-02 PROCEDURE — C9600 PERC DRUG-EL COR STENT SING: HCPCS | Performed by: INTERNAL MEDICINE

## 2023-05-02 PROCEDURE — 82565 ASSAY OF CREATININE: CPT

## 2023-05-02 PROCEDURE — 92928 PRQ TCAT PLMT NTRAC ST 1 LES: CPT | Performed by: INTERNAL MEDICINE

## 2023-05-02 PROCEDURE — 4A023N7 MEASUREMENT OF CARDIAC SAMPLING AND PRESSURE, LEFT HEART, PERCUTANEOUS APPROACH: ICD-10-PCS | Performed by: INTERNAL MEDICINE

## 2023-05-02 PROCEDURE — 93306 TTE W/DOPPLER COMPLETE: CPT

## 2023-05-02 PROCEDURE — 83735 ASSAY OF MAGNESIUM: CPT | Performed by: INTERNAL MEDICINE

## 2023-05-02 PROCEDURE — B2111ZZ FLUOROSCOPY OF MULTIPLE CORONARY ARTERIES USING LOW OSMOLAR CONTRAST: ICD-10-PCS | Performed by: INTERNAL MEDICINE

## 2023-05-02 PROCEDURE — 85347 COAGULATION TIME ACTIVATED: CPT

## 2023-05-02 PROCEDURE — 85025 COMPLETE CBC W/AUTO DIFF WBC: CPT | Performed by: INTERNAL MEDICINE

## 2023-05-02 PROCEDURE — 84484 ASSAY OF TROPONIN QUANT: CPT | Performed by: INTERNAL MEDICINE

## 2023-05-02 PROCEDURE — B2181ZZ FLUOROSCOPY OF LEFT INTERNAL MAMMARY BYPASS GRAFT USING LOW OSMOLAR CONTRAST: ICD-10-PCS | Performed by: INTERNAL MEDICINE

## 2023-05-02 PROCEDURE — 25010000002 FENTANYL CITRATE (PF) 50 MCG/ML SOLUTION: Performed by: INTERNAL MEDICINE

## 2023-05-02 PROCEDURE — 99223 1ST HOSP IP/OBS HIGH 75: CPT | Performed by: INTERNAL MEDICINE

## 2023-05-02 PROCEDURE — 83880 ASSAY OF NATRIURETIC PEPTIDE: CPT | Performed by: INTERNAL MEDICINE

## 2023-05-02 PROCEDURE — 25510000001 IOPAMIDOL PER 1 ML: Performed by: INTERNAL MEDICINE

## 2023-05-02 PROCEDURE — 027034Z DILATION OF CORONARY ARTERY, ONE ARTERY WITH DRUG-ELUTING INTRALUMINAL DEVICE, PERCUTANEOUS APPROACH: ICD-10-PCS | Performed by: INTERNAL MEDICINE

## 2023-05-02 PROCEDURE — 93971 EXTREMITY STUDY: CPT

## 2023-05-02 PROCEDURE — 80053 COMPREHEN METABOLIC PANEL: CPT | Performed by: INTERNAL MEDICINE

## 2023-05-02 PROCEDURE — 25010000002 MIDAZOLAM PER 1 MG: Performed by: INTERNAL MEDICINE

## 2023-05-02 PROCEDURE — 63710000001 INSULIN DETEMIR PER 5 UNITS: Performed by: HOSPITALIST

## 2023-05-02 DEVICE — XIENCE SKYPOINT™ EVEROLIMUS ELUTING CORONARY STENT SYSTEM 2.50 MM X 15 MM / RAPID-EXCHANGE
Type: IMPLANTABLE DEVICE | Site: HEART | Status: FUNCTIONAL
Brand: XIENCE SKYPOINT™

## 2023-05-02 RX ORDER — DEXTROSE MONOHYDRATE 25 G/50ML
25 INJECTION, SOLUTION INTRAVENOUS
Status: DISCONTINUED | OUTPATIENT
Start: 2023-05-02 | End: 2023-05-04 | Stop reason: HOSPADM

## 2023-05-02 RX ORDER — FENOFIBRATE 145 MG/1
145 TABLET, COATED ORAL DAILY
Status: DISCONTINUED | OUTPATIENT
Start: 2023-05-02 | End: 2023-05-04 | Stop reason: HOSPADM

## 2023-05-02 RX ORDER — ONDANSETRON 2 MG/ML
INJECTION INTRAMUSCULAR; INTRAVENOUS
Status: DISCONTINUED | OUTPATIENT
Start: 2023-05-02 | End: 2023-05-02 | Stop reason: HOSPADM

## 2023-05-02 RX ORDER — HEPARIN SODIUM 1000 [USP'U]/ML
INJECTION, SOLUTION INTRAVENOUS; SUBCUTANEOUS
Status: DISCONTINUED | OUTPATIENT
Start: 2023-05-02 | End: 2023-05-02 | Stop reason: HOSPADM

## 2023-05-02 RX ORDER — PAROXETINE HYDROCHLORIDE 20 MG/1
40 TABLET, FILM COATED ORAL EVERY MORNING
Status: DISCONTINUED | OUTPATIENT
Start: 2023-05-03 | End: 2023-05-04 | Stop reason: HOSPADM

## 2023-05-02 RX ORDER — NICARDIPINE HCL-0.9% SOD CHLOR 1 MG/10 ML
SYRINGE (ML) INTRAVENOUS
Status: DISCONTINUED | OUTPATIENT
Start: 2023-05-02 | End: 2023-05-02 | Stop reason: HOSPADM

## 2023-05-02 RX ORDER — HYDROCODONE BITARTRATE AND ACETAMINOPHEN 5; 325 MG/1; MG/1
1 TABLET ORAL EVERY 6 HOURS PRN
Status: DISCONTINUED | OUTPATIENT
Start: 2023-05-02 | End: 2023-05-04 | Stop reason: HOSPADM

## 2023-05-02 RX ORDER — LIDOCAINE HYDROCHLORIDE 10 MG/ML
INJECTION, SOLUTION EPIDURAL; INFILTRATION; INTRACAUDAL; PERINEURAL
Status: DISCONTINUED | OUTPATIENT
Start: 2023-05-02 | End: 2023-05-02 | Stop reason: HOSPADM

## 2023-05-02 RX ORDER — MIDAZOLAM HYDROCHLORIDE 1 MG/ML
INJECTION INTRAMUSCULAR; INTRAVENOUS
Status: DISCONTINUED | OUTPATIENT
Start: 2023-05-02 | End: 2023-05-02 | Stop reason: HOSPADM

## 2023-05-02 RX ORDER — DILTIAZEM HYDROCHLORIDE 60 MG/1
120 TABLET, FILM COATED ORAL EVERY 12 HOURS SCHEDULED
Status: DISCONTINUED | OUTPATIENT
Start: 2023-05-02 | End: 2023-05-03

## 2023-05-02 RX ORDER — PANTOPRAZOLE SODIUM 40 MG/1
40 TABLET, DELAYED RELEASE ORAL DAILY
Status: DISCONTINUED | OUTPATIENT
Start: 2023-05-02 | End: 2023-05-04 | Stop reason: HOSPADM

## 2023-05-02 RX ORDER — FENTANYL CITRATE 50 UG/ML
INJECTION, SOLUTION INTRAMUSCULAR; INTRAVENOUS
Status: DISCONTINUED | OUTPATIENT
Start: 2023-05-02 | End: 2023-05-02 | Stop reason: HOSPADM

## 2023-05-02 RX ORDER — CLOPIDOGREL BISULFATE 75 MG/1
75 TABLET ORAL DAILY
Status: DISCONTINUED | OUTPATIENT
Start: 2023-05-02 | End: 2023-05-04 | Stop reason: HOSPADM

## 2023-05-02 RX ORDER — IBUPROFEN 600 MG/1
1 TABLET ORAL
Status: DISCONTINUED | OUTPATIENT
Start: 2023-05-02 | End: 2023-05-04 | Stop reason: HOSPADM

## 2023-05-02 RX ORDER — ASPIRIN 81 MG/1
81 TABLET ORAL DAILY
Status: DISCONTINUED | OUTPATIENT
Start: 2023-05-02 | End: 2023-05-04 | Stop reason: HOSPADM

## 2023-05-02 RX ORDER — ATORVASTATIN CALCIUM 40 MG/1
80 TABLET, FILM COATED ORAL NIGHTLY
Status: DISCONTINUED | OUTPATIENT
Start: 2023-05-02 | End: 2023-05-04 | Stop reason: HOSPADM

## 2023-05-02 RX ORDER — ACETAMINOPHEN 325 MG/1
650 TABLET ORAL EVERY 6 HOURS PRN
Status: DISCONTINUED | OUTPATIENT
Start: 2023-05-02 | End: 2023-05-02 | Stop reason: SDUPTHER

## 2023-05-02 RX ORDER — INSULIN LISPRO 100 [IU]/ML
0-14 INJECTION, SOLUTION INTRAVENOUS; SUBCUTANEOUS
Status: DISCONTINUED | OUTPATIENT
Start: 2023-05-02 | End: 2023-05-04 | Stop reason: HOSPADM

## 2023-05-02 RX ORDER — ACETAMINOPHEN 325 MG/1
650 TABLET ORAL EVERY 4 HOURS PRN
Status: DISCONTINUED | OUTPATIENT
Start: 2023-05-02 | End: 2023-05-02

## 2023-05-02 RX ORDER — NICOTINE POLACRILEX 4 MG
15 LOZENGE BUCCAL
Status: DISCONTINUED | OUTPATIENT
Start: 2023-05-02 | End: 2023-05-04 | Stop reason: HOSPADM

## 2023-05-02 RX ADMIN — CLOPIDOGREL BISULFATE 75 MG: 75 TABLET ORAL at 16:40

## 2023-05-02 RX ADMIN — FENOFIBRATE 145 MG: 145 TABLET ORAL at 18:19

## 2023-05-02 RX ADMIN — DILTIAZEM HYDROCHLORIDE 120 MG: 60 TABLET, FILM COATED ORAL at 16:40

## 2023-05-02 RX ADMIN — INSULIN DETEMIR 30 UNITS: 100 INJECTION, SOLUTION SUBCUTANEOUS at 20:15

## 2023-05-02 RX ADMIN — HYDROCODONE BITARTRATE AND ACETAMINOPHEN 1 TABLET: 5; 325 TABLET ORAL at 20:12

## 2023-05-02 RX ADMIN — ATORVASTATIN CALCIUM 80 MG: 40 TABLET, FILM COATED ORAL at 20:12

## 2023-05-02 RX ADMIN — PANTOPRAZOLE SODIUM 40 MG: 40 TABLET, DELAYED RELEASE ORAL at 16:40

## 2023-05-02 RX ADMIN — ACETAMINOPHEN 325MG 650 MG: 325 TABLET ORAL at 16:40

## 2023-05-02 NOTE — Clinical Note
POC Creatinine = 0.4 (mg/dL). POC Creatinine was drawn at 12:05 EDT. POC Creatinine result was completed at 12:07 EDT.

## 2023-05-02 NOTE — Clinical Note
First balloon inflation max pressure = 8 niru. First balloon inflation duration = 10 seconds. Second inflation of balloon - Max pressure = 8 niru. 2nd Inflation of balloon - Duration = 20 seconds. 2nd inflation was done at 13:00 EDT. Third inflation of balloon - Max pressure = 12 niru. 3rd Inflation of balloon - Duration = 15 seconds. 3rd inflation was done at 13:01 EDT.

## 2023-05-02 NOTE — Clinical Note
First balloon inflation max pressure = 14 niru. First balloon inflation duration = 15 seconds. Second inflation of balloon - Max pressure = 17 niru. 2nd Inflation of balloon - Duration = 20 seconds. 2nd inflation was done at 13:59 EDT. Third inflation of balloon - Max pressure = 16 niru. 3rd Inflation of balloon - Duration = 15 seconds. 3rd inflation was done at 14:00 EDT.

## 2023-05-02 NOTE — Clinical Note
First balloon inflation max pressure = 8 niru. First balloon inflation duration = 15 seconds. Second inflation of balloon - Max pressure = 10 niru. 2nd Inflation of balloon - Duration = 60 seconds. 2nd inflation was done at 13:34 EDT. Third inflation of balloon - Max pressure = 10 niru. 3rd Inflation of balloon - Duration = 25 seconds. 3rd inflation was done at 13:35 EDT.

## 2023-05-02 NOTE — Clinical Note
First balloon inflation max pressure = 12 niru. First balloon inflation duration = 10 seconds. Second inflation of balloon - Max pressure = 12 inru. 2nd Inflation of balloon - Duration = 10 seconds. 2nd inflation was done at 13:53 EDT.

## 2023-05-02 NOTE — H&P
[facility] Cardiology   [address], [address]  [place], [place], [address] [phone]  [email or web address]           INPATIENT HISTORY AND PHYSICAL NOTE    Patient Care Team:  Patient Care Team:  Leeanna Tovar APRN as PCP - General (Nurse Practitioner)    Chief complaint: Chest pain         Subjective:     Cardiac focused problem list:  1. CAD   a. status post 2V CABG, [facility], LIMA to LAD, free right radial to diagonal  i. Postoperative wound infection  2. Hypertension  3. Dyslipidemia  4. Diabetes  5. Obesity    HPI:      Estefani Blancas is a 57 y.o. female.  Patient presents with 1 to 2 weeks of accelerating/worsening chest pain with acute worsening this morning, severe.  Called EMS.  EMS EKG with subtle ST elevation in the inferior leads, slightly worse than baseline EKG obtained last week when the patient met Dr. Temple in the cardiology office.  Has noticed some right leg swelling postoperatively    Review of Systems:  As noted in the HPI    PFSH:  Patient Active Problem List   Diagnosis   • Coronary artery disease of native artery of native heart with stable angina pectoris   • Hypertension   • Heart attack   • Class 3 obesity with alveolar hypoventilation and body mass index (BMI) of 40.0 to 44.9 in adult   • Right middle lobe pulmonary nodule       No current facility-administered medications on file prior to encounter.     Current Outpatient Medications on File Prior to Encounter   Medication Sig Dispense Refill   • ACETAMINOPHEN 8 HOUR PO Take  by mouth.     • aspirin 81 MG EC tablet Take 1 tablet by mouth Daily.     • atorvastatin (LIPITOR) 80 MG tablet Take 1 tablet by mouth Daily.     • clopidogrel (PLAVIX) 75 MG tablet Take 1 tablet by mouth Daily.     • dilTIAZem (CARDIZEM) 120 MG tablet Take 1 tablet by mouth 2 (Two) Times a Day.     • fenofibrate (TRICOR) 145 MG tablet Take 1 tablet by mouth Daily.     • glipizide (GLUCOTROL) 10 MG tablet Take 1 tablet by  mouth 2 (Two) Times a Day Before Meals.     • Insulin Glargine (BASAGLAR KWIKPEN) 100 UNIT/ML injection pen Inject 40 Units under the skin into the appropriate area as directed 2 (Two) Times a Day.     • Insulin Pen Needle (Pen Needles) 31G X 8 MM misc 1 each 2 (Two) Times a Day. For Diabetes: E11.43 100 each 5   • metoprolol tartrate (LOPRESSOR) 25 MG tablet Take 1 tablet by mouth 2 (Two) Times a Day.     • pantoprazole (PROTONIX) 40 MG EC tablet Take 1 tablet by mouth Daily.     • PARoxetine (PAXIL) 40 MG tablet Take 1 tablet by mouth Every Morning.         Social History     Socioeconomic History   • Marital status:    Tobacco Use   • Smoking status: Never   • Smokeless tobacco: Never   Vaping Use   • Vaping Use: Never used   Substance and Sexual Activity   • Alcohol use: No   • Drug use: Defer   • Sexual activity: Defer            Objective:     Vital Sign Min/Max for last 24 hours  No data recorded   BP  Min: 112/51  Max: 129/43   Pulse  Min: 59  Max: 60   Resp  Min: 18  Max: 22   SpO2  Min: 98 %  Max: 98 %   Flow (L/min)  Min: 2  Max: 2    No intake or output data in the 24 hours ending 05/02/23 1421        Vitals:    05/02/23 1418   BP: 129/43   Pulse: 59   Resp: 18   SpO2: 98%     CONSTITUTIONAL: No acute distress  RESPIRATORY: Normal effort. Clear to auscultation bilaterally without wheezing or rales  CARDIOVASCULAR: Regular rate and rhythm with normal S1 and S2. Without murmur.  PERIPHERAL VASCULAR: No carotid bruit bilaterally.  Normal radial pulse. There is no significant lower extremity edema bilaterally.  2+ DP pulses bilaterally    Labs and radiologic results:  Today's results were reviewed by myself.    Cardiac Data:    EMS EKG, reviewed by myself: Sinus rhythm, subtle ST elevation in the inferior leads    Tele: Sinus rhythm         Assessment and Plan:     Problem list:    * No active hospital problems. *      ASSESSMENT:  1. Acute/subacute chest pain, possible STEMI  a. Subtle ST elevation  in the inferior leads, worse than prior EKG  b. No clear culprit lesion on initial LHC.    c. Hazy 80% ostial OM 2 stenosis, status post ERNESTO x1.    d. Patent LIMA to LAD.  Patent radial to diagonal branch with a distal 80% diagonal branch stenosis in a small caliber vessel. Intermediate RCA lesion with normal functional assessment by RFR of 0.97   2. CAD  a. S/p 2V CABG at [facility] 3/2023  3. Right leg swelling   4. Hypertension  5. Dyslipidemia  6. Diabetes  7. Obesity    PLAN:  1. Admission to telemetry  2. Echo, right lower extremity venous duplex, EKG, chest x-ray, labs ordered  3. Continue DAPT, P2 Y12 level in the morning  4. Continue diltiazem, beta-blocker, statin, fenofibrate  5. Insulin with insulin sliding scale.  Holding off glipizide  6. Continue home Paxil and Protonix  7. Dr. Temple to resume cardiac care tomorrow morning    Korey Rene MD, MSc, FACC, [facility]  Interventional Cardiology  [facility]

## 2023-05-02 NOTE — Clinical Note
Hemostasis started on the left radial artery. R-Band was used in achieving hemostasis. Radial compression device applied to vessel. Hemostasis achieved successfully. Closure device additional comment: TR band 12cc

## 2023-05-02 NOTE — Clinical Note
First balloon inflation max pressure = 12 niru. First balloon inflation duration = 30 seconds. Second inflation of balloon - Max pressure = 12 niru. 2nd Inflation of balloon - Duration = 30 seconds. 2nd inflation was done at 13:22 EDT.

## 2023-05-03 ENCOUNTER — APPOINTMENT (OUTPATIENT)
Dept: CT IMAGING | Facility: HOSPITAL | Age: 58
DRG: 247 | End: 2023-05-03
Payer: MEDICAID

## 2023-05-03 LAB
ANION GAP SERPL CALCULATED.3IONS-SCNC: 12 MMOL/L (ref 5–15)
ASCENDING AORTA: 4.1 CM
BACTERIA UR QL AUTO: ABNORMAL /HPF
BH CV ECHO MEAS - AO MAX PG: 11.3 MMHG
BH CV ECHO MEAS - AO MEAN PG: 6 MMHG
BH CV ECHO MEAS - AO ROOT DIAM: 3.2 CM
BH CV ECHO MEAS - AO V2 MAX: 168 CM/SEC
BH CV ECHO MEAS - AO V2 VTI: 35.4 CM
BH CV ECHO MEAS - AVA(I,D): 2.18 CM2
BH CV ECHO MEAS - EDV(CUBED): 107.2 ML
BH CV ECHO MEAS - EDV(MOD-SP2): 85.5 ML
BH CV ECHO MEAS - EDV(MOD-SP4): 123 ML
BH CV ECHO MEAS - EF(MOD-BP): 57.2 %
BH CV ECHO MEAS - EF(MOD-SP2): 61.2 %
BH CV ECHO MEAS - EF(MOD-SP4): 56 %
BH CV ECHO MEAS - ESV(CUBED): 35.9 ML
BH CV ECHO MEAS - ESV(MOD-SP2): 33.2 ML
BH CV ECHO MEAS - ESV(MOD-SP4): 54.1 ML
BH CV ECHO MEAS - FS: 30.5 %
BH CV ECHO MEAS - IVS/LVPW: 0.87 CM
BH CV ECHO MEAS - IVSD: 1.05 CM
BH CV ECHO MEAS - LA DIMENSION: 3.8 CM
BH CV ECHO MEAS - LAT PEAK E' VEL: 9.8 CM/SEC
BH CV ECHO MEAS - LV MASS(C)D: 196.8 GRAMS
BH CV ECHO MEAS - LV MAX PG: 5.3 MMHG
BH CV ECHO MEAS - LV MEAN PG: 3 MMHG
BH CV ECHO MEAS - LV V1 MAX: 115 CM/SEC
BH CV ECHO MEAS - LV V1 VTI: 24.6 CM
BH CV ECHO MEAS - LVIDD: 4.8 CM
BH CV ECHO MEAS - LVIDS: 3.3 CM
BH CV ECHO MEAS - LVOT AREA: 3.1 CM2
BH CV ECHO MEAS - LVOT DIAM: 2 CM
BH CV ECHO MEAS - LVPWD: 1.2 CM
BH CV ECHO MEAS - MED PEAK E' VEL: 7.9 CM/SEC
BH CV ECHO MEAS - MV A MAX VEL: 74.9 CM/SEC
BH CV ECHO MEAS - MV DEC TIME: 0.29 MSEC
BH CV ECHO MEAS - MV E MAX VEL: 79.1 CM/SEC
BH CV ECHO MEAS - MV E/A: 1.06
BH CV ECHO MEAS - PA ACC TIME: 0.17 SEC
BH CV ECHO MEAS - PA PR(ACCEL): 4.8 MMHG
BH CV ECHO MEAS - PA V2 MAX: 104 CM/SEC
BH CV ECHO MEAS - RAP SYSTOLE: 3 MMHG
BH CV ECHO MEAS - RVSP: 27 MMHG
BH CV ECHO MEAS - SV(LVOT): 77.3 ML
BH CV ECHO MEAS - SV(MOD-SP2): 52.3 ML
BH CV ECHO MEAS - SV(MOD-SP4): 68.9 ML
BH CV ECHO MEAS - TAPSE (>1.6): 1.86 CM
BH CV ECHO MEAS - TR MAX PG: 24.2 MMHG
BH CV ECHO MEAS - TR MAX VEL: 246 CM/SEC
BH CV ECHO MEASUREMENTS AVERAGE E/E' RATIO: 8.94
BH CV LOWER VASCULAR LEFT COMMON FEMORAL PHASIC: NORMAL
BH CV LOWER VASCULAR LEFT COMMON FEMORAL SPONT: NORMAL
BH CV LOWER VASCULAR RIGHT COMMON FEMORAL COMPRESS: NORMAL
BH CV LOWER VASCULAR RIGHT COMMON FEMORAL PHASIC: NORMAL
BH CV LOWER VASCULAR RIGHT COMMON FEMORAL SPONT: NORMAL
BH CV LOWER VASCULAR RIGHT DISTAL FEMORAL COMPRESS: NORMAL
BH CV LOWER VASCULAR RIGHT DISTAL FEMORAL PHASIC: NORMAL
BH CV LOWER VASCULAR RIGHT DISTAL FEMORAL SPONT: NORMAL
BH CV LOWER VASCULAR RIGHT GASTRONEMIUS COMPRESS: NORMAL
BH CV LOWER VASCULAR RIGHT GREATER SAPH AK COMPRESS: NORMAL
BH CV LOWER VASCULAR RIGHT GREATER SAPH BK COMPRESS: NORMAL
BH CV LOWER VASCULAR RIGHT LESSER SAPH COMPRESS: NORMAL
BH CV LOWER VASCULAR RIGHT MID FEMORAL COMPRESS: NORMAL
BH CV LOWER VASCULAR RIGHT MID FEMORAL PHASIC: NORMAL
BH CV LOWER VASCULAR RIGHT MID FEMORAL SPONT: NORMAL
BH CV LOWER VASCULAR RIGHT PERONEAL COMPRESS: NORMAL
BH CV LOWER VASCULAR RIGHT POPLITEAL COMPRESS: NORMAL
BH CV LOWER VASCULAR RIGHT POPLITEAL PHASIC: NORMAL
BH CV LOWER VASCULAR RIGHT POPLITEAL SPONT: NORMAL
BH CV LOWER VASCULAR RIGHT POSTERIOR TIBIAL COMPRESS: NORMAL
BH CV LOWER VASCULAR RIGHT PROFUNDA FEMORAL COMPRESS: NORMAL
BH CV LOWER VASCULAR RIGHT PROFUNDA FEMORAL PHASIC: NORMAL
BH CV LOWER VASCULAR RIGHT PROFUNDA FEMORAL SPONT: NORMAL
BH CV LOWER VASCULAR RIGHT PROXIMAL FEMORAL COMPRESS: NORMAL
BH CV LOWER VASCULAR RIGHT PROXIMAL FEMORAL PHASIC: NORMAL
BH CV LOWER VASCULAR RIGHT PROXIMAL FEMORAL SPONT: NORMAL
BH CV LOWER VASCULAR RIGHT SAPHENOFEMORAL JUNCTION COMPRESS: NORMAL
BH CV LOWER VASCULAR RIGHT SAPHENOFEMORAL JUNCTION PHASIC: NORMAL
BH CV LOWER VASCULAR RIGHT SAPHENOFEMORAL JUNCTION SPONT: NORMAL
BH CV VAS BP LEFT ARM: NORMAL MMHG
BH CV XLRA - RV BASE: 3.6 CM
BH CV XLRA - RV LENGTH: 5.7 CM
BH CV XLRA - RV MID: 3.4 CM
BH CV XLRA - TDI S': 6.9 CM/SEC
BILIRUB UR QL STRIP: NEGATIVE
BUN SERPL-MCNC: 10 MG/DL (ref 6–20)
BUN/CREAT SERPL: 17.2 (ref 7–25)
CALCIUM SPEC-SCNC: 8.8 MG/DL (ref 8.6–10.5)
CHLORIDE SERPL-SCNC: 105 MMOL/L (ref 98–107)
CHOLEST SERPL-MCNC: 154 MG/DL (ref 0–200)
CLARITY UR: CLEAR
CO2 SERPL-SCNC: 22 MMOL/L (ref 22–29)
COLOR UR: YELLOW
CREAT SERPL-MCNC: 0.58 MG/DL (ref 0.57–1)
D DIMER PPP FEU-MCNC: 0.51 MCGFEU/ML (ref 0–0.55)
DEPRECATED RDW RBC AUTO: 44.8 FL (ref 37–54)
EGFRCR SERPLBLD CKD-EPI 2021: 107 ML/MIN/1.73
ERYTHROCYTE [DISTWIDTH] IN BLOOD BY AUTOMATED COUNT: 14.7 % (ref 12.3–15.4)
GLUCOSE BLDC GLUCOMTR-MCNC: 112 MG/DL (ref 70–130)
GLUCOSE BLDC GLUCOMTR-MCNC: 208 MG/DL (ref 70–130)
GLUCOSE BLDC GLUCOMTR-MCNC: 210 MG/DL (ref 70–130)
GLUCOSE SERPL-MCNC: 103 MG/DL (ref 65–99)
GLUCOSE UR STRIP-MCNC: NEGATIVE MG/DL
HBA1C MFR BLD: 9.6 % (ref 4.8–5.6)
HCT VFR BLD AUTO: 39.2 % (ref 34–46.6)
HDLC SERPL-MCNC: 34 MG/DL (ref 40–60)
HGB BLD-MCNC: 12.1 G/DL (ref 12–15.9)
HGB UR QL STRIP.AUTO: NEGATIVE
HYALINE CASTS UR QL AUTO: ABNORMAL /LPF
KETONES UR QL STRIP: NEGATIVE
LDLC SERPL CALC-MCNC: 78 MG/DL (ref 0–100)
LDLC/HDLC SERPL: 2.01 {RATIO}
LEFT ATRIUM VOLUME INDEX: 24.1 ML/M2
LEUKOCYTE ESTERASE UR QL STRIP.AUTO: ABNORMAL
MAXIMAL PREDICTED HEART RATE: 165 BPM
MAXIMAL PREDICTED HEART RATE: 165 BPM
MCH RBC QN AUTO: 25.8 PG (ref 26.6–33)
MCHC RBC AUTO-ENTMCNC: 30.9 G/DL (ref 31.5–35.7)
MCV RBC AUTO: 83.6 FL (ref 79–97)
NITRITE UR QL STRIP: NEGATIVE
PA ADP PRP-ACNC: 140 PRU
PH UR STRIP.AUTO: <=5 [PH] (ref 5–8)
PLATELET # BLD AUTO: 291 10*3/MM3 (ref 140–450)
PMV BLD AUTO: 11 FL (ref 6–12)
POTASSIUM SERPL-SCNC: 3.9 MMOL/L (ref 3.5–5.2)
PROT UR QL STRIP: NEGATIVE
RBC # BLD AUTO: 4.69 10*6/MM3 (ref 3.77–5.28)
RBC # UR STRIP: ABNORMAL /HPF
REF LAB TEST METHOD: ABNORMAL
SODIUM SERPL-SCNC: 139 MMOL/L (ref 136–145)
SP GR UR STRIP: 1.01 (ref 1–1.03)
SQUAMOUS #/AREA URNS HPF: ABNORMAL /HPF
STRESS TARGET HR: 140 BPM
STRESS TARGET HR: 140 BPM
TRIGL SERPL-MCNC: 258 MG/DL (ref 0–150)
UROBILINOGEN UR QL STRIP: ABNORMAL
VLDLC SERPL-MCNC: 42 MG/DL (ref 5–40)
WBC # UR STRIP: ABNORMAL /HPF
WBC NRBC COR # BLD: 9.35 10*3/MM3 (ref 3.4–10.8)

## 2023-05-03 PROCEDURE — 82948 REAGENT STRIP/BLOOD GLUCOSE: CPT

## 2023-05-03 PROCEDURE — 25010000002 FUROSEMIDE PER 20 MG: Performed by: INTERNAL MEDICINE

## 2023-05-03 PROCEDURE — 85576 BLOOD PLATELET AGGREGATION: CPT | Performed by: INTERNAL MEDICINE

## 2023-05-03 PROCEDURE — 63710000001 INSULIN LISPRO (HUMAN) PER 5 UNITS: Performed by: HOSPITALIST

## 2023-05-03 PROCEDURE — 93005 ELECTROCARDIOGRAM TRACING: CPT | Performed by: INTERNAL MEDICINE

## 2023-05-03 PROCEDURE — 71275 CT ANGIOGRAPHY CHEST: CPT

## 2023-05-03 PROCEDURE — 81001 URINALYSIS AUTO W/SCOPE: CPT | Performed by: PHYSICIAN ASSISTANT

## 2023-05-03 PROCEDURE — 63710000001 INSULIN DETEMIR PER 5 UNITS: Performed by: HOSPITALIST

## 2023-05-03 PROCEDURE — 99233 SBSQ HOSP IP/OBS HIGH 50: CPT | Performed by: INTERNAL MEDICINE

## 2023-05-03 PROCEDURE — 25510000001 IOPAMIDOL PER 1 ML: Performed by: INTERNAL MEDICINE

## 2023-05-03 PROCEDURE — 85379 FIBRIN DEGRADATION QUANT: CPT | Performed by: INTERNAL MEDICINE

## 2023-05-03 PROCEDURE — 83036 HEMOGLOBIN GLYCOSYLATED A1C: CPT | Performed by: INTERNAL MEDICINE

## 2023-05-03 PROCEDURE — 93010 ELECTROCARDIOGRAM REPORT: CPT | Performed by: INTERNAL MEDICINE

## 2023-05-03 PROCEDURE — 80048 BASIC METABOLIC PNL TOTAL CA: CPT | Performed by: INTERNAL MEDICINE

## 2023-05-03 PROCEDURE — 80061 LIPID PANEL: CPT | Performed by: INTERNAL MEDICINE

## 2023-05-03 PROCEDURE — 85027 COMPLETE CBC AUTOMATED: CPT | Performed by: INTERNAL MEDICINE

## 2023-05-03 RX ORDER — COLCHICINE 0.6 MG/1
0.6 TABLET ORAL DAILY
Status: DISCONTINUED | OUTPATIENT
Start: 2023-05-04 | End: 2023-05-04 | Stop reason: HOSPADM

## 2023-05-03 RX ORDER — DILTIAZEM HYDROCHLORIDE 120 MG/1
120 CAPSULE, COATED, EXTENDED RELEASE ORAL DAILY
COMMUNITY

## 2023-05-03 RX ORDER — DILTIAZEM HYDROCHLORIDE 120 MG/1
120 CAPSULE, COATED, EXTENDED RELEASE ORAL DAILY
Status: DISCONTINUED | OUTPATIENT
Start: 2023-05-04 | End: 2023-05-04 | Stop reason: HOSPADM

## 2023-05-03 RX ORDER — COLCHICINE 0.6 MG/1
1.2 TABLET ORAL ONCE
Status: COMPLETED | OUTPATIENT
Start: 2023-05-03 | End: 2023-05-03

## 2023-05-03 RX ORDER — FUROSEMIDE 10 MG/ML
40 INJECTION INTRAMUSCULAR; INTRAVENOUS DAILY
Status: DISCONTINUED | OUTPATIENT
Start: 2023-05-03 | End: 2023-05-04 | Stop reason: HOSPADM

## 2023-05-03 RX ADMIN — INSULIN LISPRO 5 UNITS: 100 INJECTION, SOLUTION INTRAVENOUS; SUBCUTANEOUS at 13:34

## 2023-05-03 RX ADMIN — PANTOPRAZOLE SODIUM 40 MG: 40 TABLET, DELAYED RELEASE ORAL at 09:25

## 2023-05-03 RX ADMIN — FUROSEMIDE 40 MG: 10 INJECTION, SOLUTION INTRAMUSCULAR; INTRAVENOUS at 12:50

## 2023-05-03 RX ADMIN — INSULIN DETEMIR 30 UNITS: 100 INJECTION, SOLUTION SUBCUTANEOUS at 20:19

## 2023-05-03 RX ADMIN — METOPROLOL TARTRATE 25 MG: 25 TABLET, FILM COATED ORAL at 09:25

## 2023-05-03 RX ADMIN — DILTIAZEM HYDROCHLORIDE 120 MG: 60 TABLET, FILM COATED ORAL at 09:25

## 2023-05-03 RX ADMIN — IOPAMIDOL 80 ML: 755 INJECTION, SOLUTION INTRAVENOUS at 16:07

## 2023-05-03 RX ADMIN — HYDROCODONE BITARTRATE AND ACETAMINOPHEN 1 TABLET: 5; 325 TABLET ORAL at 20:18

## 2023-05-03 RX ADMIN — ASPIRIN 81 MG: 81 TABLET, COATED ORAL at 09:25

## 2023-05-03 RX ADMIN — COLCHICINE 1.2 MG: 0.6 TABLET, FILM COATED ORAL at 12:49

## 2023-05-03 RX ADMIN — CLOPIDOGREL BISULFATE 75 MG: 75 TABLET ORAL at 09:24

## 2023-05-03 RX ADMIN — INSULIN DETEMIR 30 UNITS: 100 INJECTION, SOLUTION SUBCUTANEOUS at 09:26

## 2023-05-03 RX ADMIN — PAROXETINE HYDROCHLORIDE 40 MG: 20 TABLET, FILM COATED ORAL at 09:24

## 2023-05-03 RX ADMIN — HYDROCODONE BITARTRATE AND ACETAMINOPHEN 1 TABLET: 5; 325 TABLET ORAL at 01:44

## 2023-05-03 RX ADMIN — FENOFIBRATE 145 MG: 145 TABLET ORAL at 09:25

## 2023-05-03 RX ADMIN — HYDROCODONE BITARTRATE AND ACETAMINOPHEN 1 TABLET: 5; 325 TABLET ORAL at 12:17

## 2023-05-03 RX ADMIN — INSULIN LISPRO 5 UNITS: 100 INJECTION, SOLUTION INTRAVENOUS; SUBCUTANEOUS at 17:15

## 2023-05-03 RX ADMIN — ATORVASTATIN CALCIUM 80 MG: 40 TABLET, FILM COATED ORAL at 20:18

## 2023-05-03 NOTE — PAYOR COMM NOTE
"Estefani Morgan (57 y.o. Female)     [id number]    Stephanie Castro, RN  Utilization Review  [phone]  [phone]      Date of Birth   1965    Social Security Number       Address   [address] [place] [address]    Home Phone   [phone]    MRN   [id number]       [place]   [facility]    Marital Status                               Admission Date   5/2/23    Admission Type   Emergency    Admitting Provider   Korey Rene MD    Attending Provider   Korey Rene MD    Department, Room/Bed   [facility] 6A, N623/1       Discharge Date       Discharge Disposition       Discharge Destination                               Attending Provider: Korey Rene MD    Allergies: Benazepril-hydrochlorothiazide, Latex    Isolation: None   Infection: None   Code Status: CPR    Ht: 177.8 cm (70\")   Wt: 129 kg (284 lb 6.3 oz)    Admission Cmt: None   Principal Problem: STEMI involving left circumflex coronary artery [I21.21]                 Active Insurance as of 5/2/2023     Primary Coverage     Payor Plan Insurance Group Employer/Plan Group    [place] [id number]     Payor Plan Address Payor Plan Phone Number Payor Plan Fax Number Effective Dates    [employer] [address] [address] [phone]  4/1/2023 - None Entered    [facility] [id number]       Subscriber Name Subscriber Birth Date Member ID       ESTEFANI MORGAN 1965 [id number]                 Emergency Contacts      (Rel.) Home Phone Work Phone Mobile Phone    MARIA LUZ MORGAN (Spouse) -- -- [phone]               History & Physical      Korey Rene MD at 05/02/23 1206           [facility] Cardiology   [facility] [facility], [address]  [place], [place], [address] [phone]  WWW.[facility]           INPATIENT HISTORY AND PHYSICAL NOTE    Patient Care Team:  Patient Care Team:  Leeanna Tovar APRN as PCP - General (Nurse " Practitioner)    Chief complaint: Chest pain        Subjective:     Cardiac focused problem list:  1. CAD   a. status post 2V CABG, [facility], LIMA to LAD, free right radial to diagonal  i. Postoperative wound infection  2. Hypertension  3. Dyslipidemia  4. Diabetes  5. Obesity    HPI:      Estefani Blancas is a 57 y.o. female.  Patient presents with 1 to 2 weeks of accelerating/worsening chest pain with acute worsening this morning, severe.  Called EMS.  EMS EKG with subtle ST elevation in the inferior leads, slightly worse than baseline EKG obtained last week when the patient met Dr. Temple in the cardiology office.  Has noticed some right leg swelling postoperatively    Review of Systems:  As noted in the HPI    PFSH:  Patient Active Problem List   Diagnosis   • Coronary artery disease of native artery of native heart with stable angina pectoris   • Hypertension   • Heart attack   • Class 3 obesity with alveolar hypoventilation and body mass index (BMI) of 40.0 to 44.9 in adult   • Right middle lobe pulmonary nodule       No current facility-administered medications on file prior to encounter.     Current Outpatient Medications on File Prior to Encounter   Medication Sig Dispense Refill   • ACETAMINOPHEN 8 HOUR PO Take  by mouth.     • aspirin 81 MG EC tablet Take 1 tablet by mouth Daily.     • atorvastatin (LIPITOR) 80 MG tablet Take 1 tablet by mouth Daily.     • clopidogrel (PLAVIX) 75 MG tablet Take 1 tablet by mouth Daily.     • dilTIAZem (CARDIZEM) 120 MG tablet Take 1 tablet by mouth 2 (Two) Times a Day.     • fenofibrate (TRICOR) 145 MG tablet Take 1 tablet by mouth Daily.     • glipizide (GLUCOTROL) 10 MG tablet Take 1 tablet by mouth 2 (Two) Times a Day Before Meals.     • Insulin Glargine (BASAGLAR KWIKPEN) 100 UNIT/ML injection pen Inject 40 Units under the skin into the appropriate area as directed 2 (Two) Times a Day.     • Insulin Pen Needle (Pen Needles) 31G X 8 MM misc 1 each 2 (Two) Times a Day.  For Diabetes: E11.43 100 each 5   • metoprolol tartrate (LOPRESSOR) 25 MG tablet Take 1 tablet by mouth 2 (Two) Times a Day.     • pantoprazole (PROTONIX) 40 MG EC tablet Take 1 tablet by mouth Daily.     • PARoxetine (PAXIL) 40 MG tablet Take 1 tablet by mouth Every Morning.         Social History     Socioeconomic History   • Marital status:    Tobacco Use   • Smoking status: Never   • Smokeless tobacco: Never   Vaping Use   • Vaping Use: Never used   Substance and Sexual Activity   • Alcohol use: No   • Drug use: Defer   • Sexual activity: Defer           Objective:     Vital Sign Min/Max for last 24 hours  No data recorded   BP  Min: 112/51  Max: 129/43   Pulse  Min: 59  Max: 60   Resp  Min: 18  Max: 22   SpO2  Min: 98 %  Max: 98 %   Flow (L/min)  Min: 2  Max: 2    No intake or output data in the 24 hours ending 05/02/23 1421        Vitals:    05/02/23 1418   BP: 129/43   Pulse: 59   Resp: 18   SpO2: 98%     CONSTITUTIONAL: No acute distress  RESPIRATORY: Normal effort. Clear to auscultation bilaterally without wheezing or rales  CARDIOVASCULAR: Regular rate and rhythm with normal S1 and S2. Without murmur.  PERIPHERAL VASCULAR: No carotid bruit bilaterally.  Normal radial pulse. There is no significant lower extremity edema bilaterally.  2+ DP pulses bilaterally    Labs and radiologic results:  Today's results were reviewed by myself.    Cardiac Data:    EMS EKG, reviewed by myself: Sinus rhythm, subtle ST elevation in the inferior leads    Tele: Sinus rhythm        Assessment and Plan:     Problem list:    * No active hospital problems. *      ASSESSMENT:  1. Acute/subacute chest pain, possible STEMI  a. Subtle ST elevation in the inferior leads, worse than prior EKG  b. No clear culprit lesion on initial LHC.    c. Hazy 80% ostial OM 2 stenosis, status post ERNESTO x1.    d. Patent LIMA to LAD.  Patent radial to diagonal branch with a distal 80% diagonal branch stenosis in a small caliber vessel.  Intermediate RCA lesion with normal functional assessment by RFR of 0.97   2. CAD  a. S/p 2V CABG at [facility] 3/2023  3. Right leg swelling   4. Hypertension  5. Dyslipidemia  6. Diabetes  7. Obesity    PLAN:  1. Admission to telemetry  2. Echo, right lower extremity venous duplex, EKG, chest x-ray, labs ordered  3. Continue DAPT, P2 Y12 level in the morning  4. Continue diltiazem, beta-blocker, statin, fenofibrate  5. Insulin with insulin sliding scale.  Holding off glipizide  6. Continue home Paxil and Protonix  7. Dr. Temple to resume cardiac care tomorrow morning    Korey Rene MD, MSc, FACC, [facility]  Interventional Cardiology  [facility]              Electronically signed by Korey Rene MD at 05/02/23 1441       Emergency Department Notes    No notes of this type exist for this encounter.         Vital Signs (last day)     Date/Time Temp Temp src Pulse Resp BP Patient Position SpO2    05/03/23 0737 -- -- 71 -- -- -- 95    05/03/23 0734 -- -- 78 -- -- -- 89    05/03/23 0720 98.7 (37.1) Oral 62 18 110/71 Lying 92    05/03/23 0645 -- -- 62 -- -- -- --    05/03/23 0452 98.5 (36.9) Oral -- 18 113/73 Lying --    05/03/23 0440 -- -- 57 -- -- -- 92    05/03/23 0220 -- -- 55 -- -- -- 93    05/03/23 0102 98.2 (36.8) Oral 75 18 125/82 Lying --    05/03/23 0020 -- -- 58 -- -- -- 94    05/02/23 2012 -- -- 53 -- 120/63 -- 92    05/02/23 1815 -- -- 57 -- 119/77 -- 92    05/02/23 1805 -- -- -- -- -- -- 93    05/02/23 1800 -- -- 55 -- 110/69 -- 93 05/02/23 1750 -- -- 59 -- 124/76 -- 94    05/02/23 1730 98.3 (36.8) Oral 58 18 144/84 Lying --    05/02/23 1700 -- -- 61 -- 111/60 -- 96    05/02/23 1645 -- -- 60 -- 108/60 -- 94    05/02/23 1630 -- -- 59 -- 117/64 -- 94    05/02/23 1615 -- -- 59 -- 128/74 -- 94 05/02/23 1601 -- -- 58 -- 121/67 -- 94    05/02/23 1600 -- -- 60 -- 121/67 -- 95    05/02/23 1545 -- -- 57 -- 117/71 -- 94    05/02/23 1530 -- -- 56 -- 112/67 -- 93    05/02/23 1515 -- -- 55 -- 108/66 --  93    05/02/23 1500 -- -- 54 -- 105/61 -- 93    05/02/23 1445 -- -- 53 -- 108/59 -- 96    05/02/23 1438 -- -- 57 -- -- -- 90    05/02/23 1435 -- -- -- -- 102/61 -- --    05/02/23 14:18:13 -- -- 59 18 129/43 -- 98    05/02/23 12:08:56 -- -- -- -- -- -- 98    05/02/23 12:08:08 -- -- 60 22 112/51 -- 98          Oxygen Therapy (last day)     Date/Time SpO2 Device (Oxygen Therapy) Flow (L/min) Oxygen Concentration (%) ETCO2 (mmHg)    05/03/23 0737 95 nasal cannula;humidified 4 -- --    05/03/23 0734 89 -- 3 -- --    05/03/23 0720 92 nasal cannula 4 -- --    05/03/23 0645 -- nasal cannula 2 -- --    05/03/23 0452 -- nasal cannula -- -- --    05/03/23 0440 92 nasal cannula 2 -- --    05/03/23 0220 93 nasal cannula 2 -- --    05/03/23 0020 94 nasal cannula 2 -- --    05/02/23 2012 92 nasal cannula 2 -- --    05/02/23 1815 92 -- -- -- --    05/02/23 1805 93 -- -- -- --    05/02/23 1800 93 nasal cannula 2 -- --    05/02/23 1750 94 -- -- -- --    05/02/23 1700 96 -- -- -- --    05/02/23 1645 94 -- -- -- --    05/02/23 1630 94 -- -- -- --    05/02/23 1615 94 -- -- -- --    05/02/23 1601 94 -- -- -- --    05/02/23 1600 95 -- -- -- --    05/02/23 1545 94 -- -- -- --    05/02/23 1530 93 -- -- -- --    05/02/23 1515 93 -- -- -- --    05/02/23 1500 93 -- -- -- --    05/02/23 1445 96 nasal cannula 2 -- --    05/02/23 1438 90 room air -- -- --    05/02/23 14:18:13 98 -- -- -- --    05/02/23 12:08:56 98 nasal cannula with ETCO2 2 -- --    05/02/23 12:08:08 98 -- -- -- --          Lines, Drains & Airways     Active LDAs     Name Placement date Placement time Site Days    Peripheral IV 05/02/23 1600 Anterior;Left;Proximal Forearm 05/02/23  1600  Forearm  less than 1    External Urinary Catheter 05/02/23 2026  --  less than 1                  Current Facility-Administered Medications   Medication Dose Route Frequency Provider Last Rate Last Admin   • aspirin EC tablet 81 mg  81 mg Oral Daily Patricia Davidson, APRN       • atorvastatin  (LIPITOR) tablet 80 mg  80 mg Oral Nightly Patricia Davidson APRN   80 mg at 05/02/23 2012   • clopidogrel (PLAVIX) tablet 75 mg  75 mg Oral Daily Patricia Davidson, APRN   75 mg at 05/02/23 1640   • dextrose (D50W) (25 g/50 mL) IV injection 25 g  25 g Intravenous Q15 Min PRN Patricia Davidson APRN       • dextrose (GLUTOSE) oral gel 15 g  15 g Oral Q15 Min PRN Patricia Davidson, APRN       • dilTIAZem (CARDIZEM) tablet 120 mg  120 mg Oral Q12H Patricia Davidson APRN   120 mg at 05/02/23 1640   • fenofibrate (TRICOR) tablet 145 mg  145 mg Oral Daily Patricia Davidson APRN   145 mg at 05/02/23 1819   • glucagon (GLUCAGEN) injection 1 mg  1 mg Intramuscular Q15 Min PRN Patricia Davidson APRN       • HYDROcodone-acetaminophen (NORCO) 5-325 MG per tablet 1 tablet  1 tablet Oral Q6H PRN Korey Rene MD   1 tablet at 05/03/23 0144   • insulin detemir (LEVEMIR) injection 30 Units  30 Units Subcutaneous Q12H Patricia Davidson APRN   30 Units at 05/02/23 2015   • Insulin Lispro (humaLOG) injection 0-14 Units  0-14 Units Subcutaneous TID AC Patricia Davidson APRREGLA       • metoprolol tartrate (LOPRESSOR) tablet 25 mg  25 mg Oral BID Patricia Davidson APRN       • pantoprazole (PROTONIX) EC tablet 40 mg  40 mg Oral Daily Patricia Davidson APRN   40 mg at 05/02/23 1640   • PARoxetine (PAXIL) tablet 40 mg  40 mg Oral QAM Patricia Davidson APRN       • Pharmacy Consult - MTM   Does not apply Daily [facility]           Lab Results (last 24 hours)     Procedure Component Value Units Date/Time    POC Glucose Once [[id number]]  (Normal) Collected: 05/03/23 0722    Specimen: Blood Updated: 05/03/23 0724     Glucose 112 mg/dL      Comment: Meter: [id number] : [id number] Masood Crespo       P2Y12 Platelet Inhibition [[id number]] Collected: 05/03/23 0401    Specimen: Blood Updated: 05/03/23 0526     P2Y12 Reactivity Unit 140 PRU     Narrative:      P2Y12 Interpretation:  Pre-Drug normal reference range is 194-418 PRU.  Test results are  reported in P2Y12 reaction units (PRU). This measures the extent of platelet aggregation in the presence of P2Y12 inhibitor drugs, such as clopidogrel (Plavix), prasugrel (Effient), ticagrelor (Brilinta), ticlopidine (Ticlid).  P2Y12 values <194 PRU (low end of reference range) are specific evidence of a P2Y12 inhibitor effect.  Patients who have been treated with Glycoprotein IIb/IIIa inhibitors should not be tested until platelet function has recovered. This time period is approximately 14 days after discontinuation of abciximab (ReoPro) and up to 48 hours after discontinuation of eptifibatide (Integrilin) and tirofiban (Aggrastat).   The P2Y12 test results should be interpreted in conjunction with other clinical and lab data available to the clinician.    Basic Metabolic Panel [[id number]]  (Abnormal) Collected: 05/03/23 0401    Specimen: Blood Updated: 05/03/23 0519     Glucose 103 mg/dL      BUN 10 mg/dL      Creatinine 0.58 mg/dL      Sodium 139 mmol/L      Potassium 3.9 mmol/L      Chloride 105 mmol/L      CO2 22.0 mmol/L      Calcium 8.8 mg/dL      BUN/Creatinine Ratio 17.2     Anion Gap 12.0 mmol/L      eGFR 107.0 mL/min/1.73     Narrative:      GFR Normal >60  Chronic Kidney Disease <60  Kidney Failure <15      Lipid Panel [[id number]]  (Abnormal) Collected: 05/03/23 0401    Specimen: Blood Updated: 05/03/23 0519     Total Cholesterol 154 mg/dL      Triglycerides 258 mg/dL      HDL Cholesterol 34 mg/dL      LDL Cholesterol  78 mg/dL      VLDL Cholesterol 42 mg/dL      LDL/HDL Ratio 2.01    Narrative:      Cholesterol Reference Ranges  (U.S. Department of Health and Human Services ATP III Classifications)    Desirable          <200 mg/dL  Borderline High    200-239 mg/dL  High Risk          >240 mg/dL      Triglyceride Reference Ranges  (U.S. Department of Health and Human Services ATP III Classifications)    Normal           <150 mg/dL  Borderline High  150-199 mg/dL  High             200-499 mg/dL  Very  "High        >500 mg/dL    HDL Reference Ranges  (U.S. Department of Health and Human Services ATP III Classifications)    Low     <40 mg/dl (major risk factor for CHD)  High    >60 mg/dl ('negative' risk factor for CHD)        LDL Reference Ranges  (U.S. Department of Health and Human Services ATP III Classifications)    Optimal          <100 mg/dL  Near Optimal     100-129 mg/dL  Borderline High  130-159 mg/dL  High             160-189 mg/dL  Very High        >189 mg/dL    D-dimer, Quantitative [[id number]]  (Normal) Collected: 05/03/23 0401    Specimen: Blood Updated: 05/03/23 0516     D-Dimer, Quantitative 0.51 MCGFEU/mL     Narrative:      According to the assay 's published package insert, a normal (<0.50 MCGFEU/mL) D-dimer result in conjunction with a non-high clinical probability assessment, excludes deep vein thrombosis (DVT) and pulmonary embolism (PE) with high sensitivity.    D-dimer values increase with age and this can make VTE exclusion of an older population difficult. To address this, the American College of Physicians, based on best available evidence and recent guidelines, recommends that clinicians use age-adjusted D-dimer thresholds in patients greater than 50 years of age with: a) a low probability of PE who do not meet all Pulmonary Embolism Rule Out Criteria, or b) in those with intermediate probability of PE.   The formula for an age-adjusted D-dimer cut-off is \"age/100\".  For example, a 60 year old patient would have an age-adjusted cut-off of 0.60 MCGFEU/mL and an 80 year old 0.80 MCGFEU/mL.    Hemoglobin A1c [[id number]]  (Abnormal) Collected: 05/03/23 0401    Specimen: Blood Updated: 05/03/23 0511     Hemoglobin A1C 9.60 %     Narrative:      Hemoglobin A1C Ranges:    Increased Risk for Diabetes  5.7% to 6.4%  Diabetes                     >= 6.5%  Diabetic Goal                < 7.0%    CBC (No Diff) [[id number]]  (Abnormal) Collected: 05/03/23 0401    Specimen: Blood Updated: " 05/03/23 0458     WBC 9.35 10*3/mm3      RBC 4.69 10*6/mm3      Hemoglobin 12.1 g/dL      Hematocrit 39.2 %      MCV 83.6 fL      MCH 25.8 pg      MCHC 30.9 g/dL      RDW 14.7 %      RDW-SD 44.8 fl      MPV 11.0 fL      Platelets 291 10*3/mm3     POC Glucose Once [[id number]]  (Abnormal) Collected: 05/02/23 2014    Specimen: Blood Updated: 05/02/23 2015     Glucose 193 mg/dL      Comment: Meter: [id number] : [id number] [facility]       High Sensitivity Troponin T 2Hr [[id number]]  (Abnormal) Collected: 05/02/23 1830    Specimen: Blood Updated: 05/02/23 1920     HS Troponin T 19 ng/L      Troponin T Delta 12 ng/L     Narrative:      High Sensitive Troponin T Reference Range:  <10.0 ng/L- Negative Female for AMI  <15.0 ng/L- Negative Male for AMI  >=10 - Abnormal Female indicating possible myocardial injury.  >=15 - Abnormal Male indicating possible myocardial injury.   Clinicians would have to utilize clinical acumen, EKG, Troponin, and serial changes to determine if it is an Acute Myocardial Infarction or myocardial injury due to an underlying chronic condition.         POC Creatinine [[id number]]  (Abnormal) Collected: 05/02/23 1206    Specimen: Blood Updated: 05/02/23 1734     Creatinine 0.40 mg/dL      Comment: Serial Number: [id number]:  [id number]       POC Activated Clotting Time [[id number]]  (Abnormal) Collected: 05/02/23 1358    Specimen: Blood Updated: 05/02/23 1720     Activated Clotting Time  311 Seconds      Comment: Serial Number: [id number]:  [id number]       POC Activated Clotting Time [[id number]]  (Abnormal) Collected: 05/02/23 1347    Specimen: Blood Updated: 05/02/23 1719     Activated Clotting Time  233 Seconds      Comment: Serial Number: [id number]:  [id number]       POC Activated Clotting Time [[id number]]  (Abnormal) Collected: 05/02/23 1315    Specimen: Blood Updated: 05/02/23 1719     Activated Clotting Time  263 Seconds      Comment: Serial Number: [id number]:  [id number]       POC  Activated Clotting Time [[id number]]  (Abnormal) Collected: 05/02/23 1304    Specimen: Blood Updated: 05/02/23 1719     Activated Clotting Time  239 Seconds      Comment: Serial Number: [id number]:  [id number]       POC Glucose Once [[id number]]  (Normal) Collected: 05/02/23 1639    Specimen: Blood Updated: 05/02/23 1641     Glucose 115 mg/dL      Comment: Meter: [id number] : [id number] Kumar Davis       CBC & Differential [[id number]]  (Abnormal) Collected: 05/02/23 1210    Specimen: Blood Updated: 05/02/23 1305    Narrative:      The following orders were created for panel order CBC & Differential.  Procedure                               Abnormality         Status                     ---------                               -----------         ------                     CBC Auto Differential[[id number]]        Abnormal            Final result                 Please view results for these tests on the individual orders.    CBC Auto Differential [[id number]]  (Abnormal) Collected: 05/02/23 1210    Specimen: Blood Updated: 05/02/23 1305     WBC 12.57 10*3/mm3      RBC 4.98 10*6/mm3      Hemoglobin 13.1 g/dL      Hematocrit 41.1 %      MCV 82.5 fL      MCH 26.3 pg      MCHC 31.9 g/dL      RDW 14.6 %      RDW-SD 44.1 fl      MPV 10.8 fL      Platelets 313 10*3/mm3      Neutrophil % 74.7 %      Lymphocyte % 15.6 %      Monocyte % 7.5 %      Eosinophil % 1.4 %      Basophil % 0.3 %      Immature Grans % 0.5 %      Neutrophils, Absolute 9.40 10*3/mm3      Lymphocytes, Absolute 1.96 10*3/mm3      Monocytes, Absolute 0.94 10*3/mm3      Eosinophils, Absolute 0.17 10*3/mm3      Basophils, Absolute 0.04 10*3/mm3      Immature Grans, Absolute 0.06 10*3/mm3      nRBC 0.0 /100 WBC     Magnesium [[id number]]  (Abnormal) Collected: 05/02/23 1210    Specimen: Blood Updated: 05/02/23 1254     Magnesium 1.5 mg/dL     Comprehensive Metabolic Panel [[id number]]  (Abnormal) Collected: 05/02/23 1210    Specimen: Blood Updated: 05/02/23 1254      Glucose 190 mg/dL      BUN 16 mg/dL      Creatinine 0.58 mg/dL      Sodium 134 mmol/L      Potassium 3.9 mmol/L      Chloride 101 mmol/L      CO2 20.0 mmol/L      Calcium 8.9 mg/dL      Total Protein 6.7 g/dL      Albumin 3.9 g/dL      ALT (SGPT) 17 U/L      AST (SGOT) 15 U/L      Alkaline Phosphatase 90 U/L      Total Bilirubin 0.5 mg/dL      Globulin 2.8 gm/dL      Comment: Calculated Result        A/G Ratio 1.4 g/dL      BUN/Creatinine Ratio 27.6     Anion Gap 13.0 mmol/L      eGFR 107.0 mL/min/1.73     Narrative:      GFR Normal >60  Chronic Kidney Disease <60  Kidney Failure <15      proBNP [[id number]]  (Normal) Collected: 05/02/23 1210    Specimen: Blood Updated: 05/02/23 1253     proBNP 138.4 pg/mL     Narrative:      Among patients with dyspnea, NT-proBNP is highly sensitive for the detection of acute congestive heart failure. In addition NT-proBNP of <300 pg/ml effectively rules out acute congestive heart failure with 99% negative predictive value.    Results may be falsely decreased if patient taking Biotin.      High Sensitivity Troponin T [[id number]]  (Normal) Collected: 05/02/23 1210    Specimen: Blood Updated: 05/02/23 1253     HS Troponin T 7 ng/L     Narrative:      High Sensitive Troponin T Reference Range:  <10.0 ng/L- Negative Female for AMI  <15.0 ng/L- Negative Male for AMI  >=10 - Abnormal Female indicating possible myocardial injury.  >=15 - Abnormal Male indicating possible myocardial injury.   Clinicians would have to utilize clinical acumen, EKG, Troponin, and serial changes to determine if it is an Acute Myocardial Infarction or myocardial injury due to an underlying chronic condition.             Imaging Results (Last 24 Hours)     Procedure Component Value Units Date/Time    XR Chest 1 View [[id number]] Collected: 05/02/23 1602     Updated: 05/02/23 1617    Narrative:      XR CHEST 1 VW    Date of Exam: 5/2/2023 3:35 PM EDT    Indication: Dyspnea    Comparison: None  available.    Findings:  Sternotomy wires are noted. The heart is enlarged and the vasculature is cephalized. There is a mild diffuse interstitial disease pattern favored to represent pulmonary interstitial edema. There is mild bibasilar discoid atelectasis but no dense lung   consolidation, evidence of effusion or pneumothorax.      Impression:      Impression:  Borderline changes of congestive heart failure. Mild bibasilar discoid atelectasis.      Electronically Signed: Jerzy Stanfordd    5/2/2023 4:14 PM EDT    Workstation ID: [id number]        ECG/EMG Results (last 24 hours)     Procedure Component Value Units Date/Time    ECG 12 Lead [[id number]] Collected: 05/02/23 1451     Updated: 05/02/23 1454     QT Interval 464 ms      QTC Interval 447 ms     Narrative:      Test Reason : Post-cath  Blood Pressure :   */*   mmHG  Vent. Rate :  56 BPM     Atrial Rate :  56 BPM     P-R Int : 190 ms          QRS Dur :  84 ms      QT Int : 464 ms       P-R-T Axes :  35  13  57 degrees     QTc Int : 447 ms    Sinus bradycardia  Low voltage QRS  Borderline ECG  No previous ECGs available    Referred By: DALE           Confirmed By:     Adult Transthoracic Echo Complete w/ Color, Spectral and Contrast if Necessary Per Protocol [[id number]] Resulted: 05/02/23 1528     Updated: 05/02/23 1532     Target HR (85%) 140 bpm      Max. Pred. HR (100%) 165 bpm      EF(MOD-bp) 57.2 %      LVIDd 4.8 cm      LVIDs 3.3 cm      IVSd 1.05 cm      LVPWd 1.20 cm      FS 30.5 %      IVS/LVPW 0.87 cm      ESV(cubed) 35.9 ml      EDV(cubed) 107.2 ml      LVOT area 3.1 cm2      LV mass(C)d 196.8 grams      LVOT diam 2.00 cm      EDV(MOD-sp2) 85.5 ml      EDV(MOD-sp4) 123.0 ml      ESV(MOD-sp2) 33.2 ml      ESV(MOD-sp4) 54.1 ml      SV(MOD-sp2) 52.3 ml      SV(MOD-sp4) 68.9 ml      EF(MOD-sp2) 61.2 %      EF(MOD-sp4) 56.0 %      MV E max roland 79.1 cm/sec      MV A max roland 74.9 cm/sec      MV dec time 0.29 msec      MV E/A 1.06     LA ESV Index (BP) 24.1 ml/m2       Med Peak E' Андрей 7.9 cm/sec      Lat Peak E' Андрей 9.8 cm/sec      Avg E/e' ratio 8.94     SV(LVOT) 77.3 ml      RV Base 3.6 cm      RV Mid 3.4 cm      RV Length 5.7 cm      TAPSE (>1.6) 1.86 cm      RV S' 6.9 cm/sec      LA dimension (2D)  3.8 cm      LV V1 max 115.0 cm/sec      LV V1 max PG 5.3 mmHg      LV V1 mean PG 3.0 mmHg      LV V1 VTI 24.6 cm      Ao pk андрей 168.0 cm/sec      Ao max PG 11.3 mmHg      Ao mean PG 6.0 mmHg      Ao V2 VTI 35.4 cm      MILA(I,D) 2.18 cm2      TR max андрей 246.0 cm/sec      TR max PG 24.2 mmHg      PA V2 max 104.0 cm/sec      PA acc time 0.17 sec      PA pr(Accel) 4.8 mmHg      Ao root diam 3.2 cm      RVSP(TR) 27 mmHg      RAP systole 3 mmHg      Ascending aorta 4.1 cm      BH CV VAS BP LEFT /59 mmHg     ECG 12 Lead [[id number]] Collected: 2316     Updated: 23 0846     QT Interval 432 ms      QTC Interval 459 ms     Narrative:      Test Reason : s/p PCI  Blood Pressure :   */*   mmHG  Vent. Rate :  68 BPM     Atrial Rate :  68 BPM     P-R Int : 144 ms          QRS Dur :  82 ms      QT Int : 432 ms       P-R-T Axes :  33  14  63 degrees     QTc Int : 459 ms    Normal sinus rhythm  Anterolateral infarct , age undetermined  Abnormal ECG  When compared with ECG of 02-MAY-2023 14:51, (Unconfirmed)  Anterior infarct is now present  Anterolateral infarct is now present    Referred By: melodie           Confirmed By:              [facility]  [address] [place] [id number] [phone]            Estefani Mary Yonny  Cardiac Catheterization/Vascular Study  Order# [id number]  Reading physician: Korey Rene MD Ordering physician: Korey Rene MD Study date: 23      Procedures    Left Heart Cath        Patient Information    Patient Name   Estefani Blancas MRN   [id number] Legal Sex   Female  (Age)   1965 (57 y.o.)     Race Ethnicity Encounter Category   White or  Not  or  Emergency        ISCV  PACS Images     Show images for Cardiac Catheterization/Vascular Study       Printable Vessel Diagram    Printable Vessel Diagram       Physicians    Panel Physicians Referring Physician Case Authorizing Physician   Korey Rene MD (Primary)         Indications    ST elevation myocardial infarction involving left circumflex coronary artery [I21.21 (ICD-10-CM)]         Pre Procedure Diagnosis    Chest pain      Post Procedure Diagnosis    CAD        Conclusion       •  80% hazy OM 2 branch stenosis status post intervention with a Xience Skypoint 2.5 x 15 mm drug-eluting stent.  •  Patent LIMA to LAD.  Patent free radial to a diseased, small caliber diagonal branch with an 80% mid segment stenosis.  50% mid RCA stenosis with an RFR of 0.97.  •  LV pressures (S/D/E) 5 mmHg           Recommendations    •       1. Admission to telemetry  2. Echo, right lower extremity venous duplex, EKG, chest x-ray, labs ordered  3. Continue DAPT, P2 Y12 level in the morning  4. Continue diltiazem, beta-blocker, statin, fenofibrate  5. Insulin with insulin sliding scale.  Holding off glipizide  6. Continue home Paxil and Protonix  7. Dr. Temple to resume cardiac care tomorrow morning       Procedure Narrative    Indication for procedure:  Acute/subacute chest pain, worsening over the last 1 to 2 weeks, subtle ST elevations in the inferior leads with slight worsening when compared to the prior EKG.  Recent 2V CABG at [facility] in March 2023.    Procedure(s) Performed:   Left radial artery access   Left heart catheterization  Selective coronary and graft angiography   Percutaneous coronary intervention of OM 2  Functional assessment of the RCA by RFR    Description of the Procedure:     A 6Fr GlideSheath slender sheath was placed in the right radial artery with ultrasound guidance. Selective angiography of the right coronary artery was performed with a 5Fr JR4 diagnostic catheter.  Selective angiography of the free radial to diagonal branch  with a 5 Sammarinese AL-1 diagnostic catheter.  Selective angiography of the LIMA to LAD with a 5 Sammarinese IMT diagnostic catheter.  Selective angiography of the left coronary arteries was performed with a 5Fr JL 3.5 diagnostic catheter.      Due to a severe stenosis of the OM branch, it was decided to proceed with intervention.  A 6 Sammarinese CLS 3.5 guide catheter was inserted.  Heparin was given for anticoagulation.  A PT2 moderate support wire was used to cross the stenosis.  The lesion was predilated with 2.0 and 2.5 mm balloons.  A guide liner was inserted.  A Xience Skypoint 2.5 x 15 mm drug-eluting stent was inserted.  The stent was postdilated with a 2.5 mm compliant and somewhat compliant balloon.    Due to an intermediate stenosis of the RCA, is decided to proceed with functional assessment.  A 5 Sammarinese JR4 diagnostic catheter was inserted.  An RFR wire was used to cross the stenosis and a measurement was taken.    Left heart catheterization with placement of a 5Fr JR4 diagnostic catheter in the left ventricle followed by left ventricular pressure measurements.      The procedure was completed and the sheath was removed.  A radial hemostasis band was placed on the artery for hemostasis.     Post-interventional Results:  Lesion #1  ACC AHA class lesion: Type non-C  Lesion location:   OM2  Stenosis pre-PCI:  80%  Stenosis post-PCI:  0%  MARIANNE flow pre-PCI:  3  MARIANNE flow post-PCI:  3    Risks, benefits and alternatives were unable to be discussed with the patient and/or family due to the case being emergent. Plan is for minimal sedation. Less than 20 mL of estimated blood loss during the case. No specimen was collected during the case.       Time Under Physician Observation    Name Panel Role Time Period   Korey Rene MD Panel 1 Primary 5/2/2023 1159 - 5/2/2023 1416      Total Sedation Time    Moderate sedation event time was not documented.         Coronary Findings      Diagnostic  Dominance: Right    Left Main    The vessel was visualized by angiography, is moderate in size and is angiographically normal.      Left Anterior Descending   The vessel is moderate in size. 90% mid to distal segment stenosis with a patent LIMA to the distal segment. Ostium of first diagonal branch not well visualized. Patent free radial to the proximal segment of this diagonal branch. 80% mid segment stenosis with a distal caliber of less than 2.0 mm.      Left Circumflex   The vessel was visualized by angiography, is moderate in size and is angiographically normal. Small OM1. Medium sized OM 2 with an 80% proximal segment stenosis. Terminating branches are 2 small posterolateral branches. Otherwise, mild diffuse atherosclerosis throughout      Right Coronary Artery   The vessel is moderate in size. 50% mid segment stenosis with an RFR of 0.97 distal to this segment. Small RPL S. Medium RPDA      LIMA Graft To Mid LAD   The graft was visualized by angiography and is moderate in size. The graft is angiographically normal.      Right Radial Artery Graft To 1st Diag   Patent vessel, but small caliber of approximately 2 mm        Intervention       No interventions have been documented.                 Vessel Access    A 6 fr sheath was  inserted with ultrasound guidance into the left radial artery.           Sheath Disposition    Hemostasis started on the left radial artery. R-Band was used in achieving hemostasis. Radial compression device applied to vessel. Hemostasis achieved successfully. Closure device additional comment: TR band 12cc         Stent Inflated     Event Details User   1:50 PM Stent Inflated Stnt Cornry Rx Xience/Skypoint Rapdxng [id number] - First balloon inflation max pressure = 10 niru. First balloon inflation duration = 15 seconds. JL       Balloon Inflated     Event Details User   12:59 PM Balloon Inflated No Supply Selected - First balloon inflation max pressure = 8 niru. First balloon inflation duration = 10 seconds. Second  inflation of balloon - Max pressure = 8 niru. 2nd Inflation of balloon - Duration = 20 seconds. 2nd inflation was done at 13:00 EDT. Third inflation of balloon - Max pressure = 12 niru. 3rd Inflation of balloon - Duration = 15 seconds. 3rd inflation was done at 13:01 EDT. JL   1:21 PM Balloon Inflated Baln Trek Mini Rx 2x15mm - First balloon inflation max pressure = 12 niru. First balloon inflation duration = 30 seconds. Second inflation of balloon - Max pressure = 12 niru. 2nd Inflation of balloon - Duration = 30 seconds. 2nd inflation was done at 13:22 EDT. JL   1:34 PM Balloon Inflated Baln Trek Rx 2.5x12mm - First balloon inflation max pressure = 8 niru. First balloon inflation duration = 15 seconds. Second inflation of balloon - Max pressure = 10 niru. 2nd Inflation of balloon - Duration = 60 seconds. 2nd inflation was done at 13:34 EDT. Third inflation of balloon - Max pressure = 10 niru. 3rd Inflation of balloon - Duration = 25 seconds. 3rd inflation was done at 13:35 EDT. JL   1:53 PM Balloon Inflated Baln Trek Nc Rx 2.5x8mm - First balloon inflation max pressure = 12 niru. First balloon inflation duration = 10 seconds. Second inflation of balloon - Max pressure = 12 niru. 2nd Inflation of balloon - Duration = 10 seconds. 2nd inflation was done at 13:53 EDT. JL   1:58 PM Balloon Inflated No Supply Selected - First balloon inflation max pressure = 14 niru. First balloon inflation duration = 15 seconds. Second inflation of balloon - Max pressure = 17 niru. 2nd Inflation of balloon - Duration = 20 seconds. 2nd inflation was done at 13:59 EDT. Third inflation of balloon - Max pressure = 16 niru. 3rd Inflation of balloon - Duration = 15 seconds. 3rd inflation was done at 14:00 EDT. JL                 Hemodynamics    LV pressures (S/D/E) 5 mmHg     LV-AO pressure gradient (peak-peak): 10 mmHg       Complications      Complications documented before study signed (5/2/2023  3:04 PM)       No complications were associated with  this study.   Documented by Korey Rene MD - 5/2/2023  3:03 PM         Radiation     Event Details User   2:16 PM Radiation Tracking Panel 1: Korey Rene MD   Cumulative Air Kerma (mGy) = 3176.000; Fluoro Time (min) = 40.3 JL        Total Contrast    Medication Name Total Dose   iopamidol (ISOVUE-370) 76 % injection 150 mL         Patient Hx Of Height, Weight, and Vitals    Height Weight BSA (Calculated - sq m) BMI (Calculated) Retired BMI (kg/m2) Pulse BP        54 105/61       Admission Information    Admission Date/Time Discharge Date/Time Room/Bed   05/02/23  12:01 PM  N623/[id number]       Medications  (Filter: Administrations occurring from 0000 to 1504 on 05/02/23)    fentaNYL citrate (PF) (SUBLIMAZE) injection (mcg)  Total dose:  125 mcg    Date/Time Rate/Dose/Volume Action    05/02/23 1211 50 mcg Given    1224 25 mcg Given    1305 25 mcg Given    1338 25 mcg Given      midazolam (VERSED) injection (mg)  Total dose:  5 mg    Date/Time Rate/Dose/Volume Action    05/02/23 1211 2 mg Given    1223 1 mg Given    1305 1 mg Given    1338 1 mg Given      lidocaine PF 1% (XYLOCAINE) injection (mL)  Total volume:  0.5 mL    Date/Time Rate/Dose/Volume Action    05/02/23 1211 0.5 mL Given      heparin (porcine) injection (Units)  Total dose:  21,600 Units    Date/Time Rate/Dose/Volume Action    05/02/23 1214 5,000 Units Given    1237 10,600 Units Given    1308 3,000 Units Given    1350 3,000 Units Given      nitroglycerin 100 mcg/mL in D5W syringe (mcg)  Total dose:  600 mcg    Date/Time Rate/Dose/Volume Action    05/02/23 1214 400 mcg Given    1338 200 mcg Given      niCARdipine HCl in NaCl (CARDENE) 1-0.9 MG/10ML-% syringe solution prefilled syringe (mcg)  Total dose:  400 mcg    Date/Time Rate/Dose/Volume Action    05/02/23 1214 400 mcg Given      ondansetron (ZOFRAN) injection (mg)  Total dose:  4 mg    Date/Time Rate/Dose/Volume Action    05/02/23 1342 4 mg Given      iopamidol (ISOVUE-370) 76 % injection (mL)  Total  volume:  150 mL    Date/Time Rate/Dose/Volume Action    05/02/23 1416 150 mL Given          Supplies    Name ID Temporary Type Charge Code Description Charge Code Quantity   KT CONTRST INJ ISOL/MANFLD W/TRANSDCR 641 No Supply HC OR SUPPLY SHELL 270/NO CPT 657846 1   KT CONTRL HND ACIST CVI CINDY HIPRESS 65 4375 No Supply   1   GW PRESSUREWIRE X WIRELESS FFR 175CM 36669 No Guidewire HC OR SUPPLY SHELL 272/ 739392 1   CVR PROB ULTRASND/TRANSD W/GEL 7X11IN STRL 19644 No Supply   1   VLV CONTRL HEMO BLEEDBACK COPILOT 45123 No Supply HC OR SUPPLY SHELL 272/NO CPT 781604 1   CATH GUIDELINER 6F 135CM 69793 No Diagnostic Catheter HC OR SUPPLY SHELL 278/ 495022 1   BALN TREK MINI RX 2X12MM 35986 No Balloon HC OR SUPPLY SHELL 278/ 669047 1   BALN TREK MINI RX 2X15MM 23420 No Balloon HC OR SUPPLY SHELL 278/ 729428 1   BALN TREK RX 2.5X12MM 03552 No Balloon HC OR SUPPLY SHELL 278/ 648063 1   BALN TREK NC RX 2.5X8MM 00790 No Balloon HC OR SUPPLY SHELL 278/ 269956 1   ST INF AMINAH SMRTSTE 20DRP 2VLV 24ML 117 64290 No Supply HC OR SUPPLY SHELL 272/NO CPT 001138 1   KT MANIFOLD CATHLAB CUST 50850 No Supply HC OR SUPPLY SHELL 272/NO CPT 139328 1   DEV INFL MONARCH 25W 70747 No Supply HC OR SUPPLY SHELL 272/NO CPT 964794 1   CATH DIAG EXPO .045 AL1 5F 100CM 41454 No Diagnostic Catheter HC OR SUPPLY SHELL 272/NO CPT 068476 1   CATH GUIDE MACH I NO/SH 6F CLS3.5 19916 No Guide Catheter HC OR SUPPLY SHELL 278/ 392785 1   GW PT 2 MS .014 185[place] [address] No Guidewire HC OR SUPPLY SHELL 272/ 451097 1   CATH DIAG IMPULSE IMT 5F 100CM 38481 No Diagnostic Catheter HC OR SUPPLY SHELL 272/NO CPT 524511 1   INTRO GLIDESHEATH SLENDER SS 21G .021 6F16CM 67730 No Supply HC OR SUPPLY SHELL 272/ 496413 1   GW PERIPH GUIDERIGHT STD/EXCHNG/J/TIP SS 0.035IN 1F493WT 39331 No Guidewire HC OR SUPPLY SHELL 272/ 525556 1   PK CATH CARD 10 64712 No Supply   1   CATH DIAG EXPO M/ PK 5F FL4/FR4 PIG 43242 No  Diagnostic Catheter HC OR SUPPLY SHELL 272/NO CPT 491424 1   ELECTRD BACK PAD/LG A/ 735958 No Supply   1   GW SIONBLUE STR 190CM 113974 No Guidewire HC OR SUPPLY SHELL 272/ 571412 1   DEV COMPR RADL PRELUDESYNCEZ 30ML 32CM 475573 No Supply HC OR SUPPLY SHELL 272/NO CPT 658753 1       Signed    Electronically signed by Korey Rene MD on 5/2/23 at 1504 EDT            Link to Procedure Log    Procedure Log        Order-Level Documents:    Scan on 5/2/2023 1207 by New Onbase, [place]: CARDIAC CATH HEMO DATA - SCAN             Results Routing Tracking    View Results Routing Information     Order Report     Order Details

## 2023-05-03 NOTE — NURSING NOTE
Pt. Referred for Phase II Cardiac Rehab. Staff discussed benefits of exercise, program protocol, and educational material provided. Teach back verified.  Patient scheduled for orientation at [facility] on May 18, 2023 at 1300 .

## 2023-05-03 NOTE — CASE MANAGEMENT/SOCIAL WORK
Discharge Planning Assessment  [facility]     Patient Name: Estefani Blancas  MRN: [id number]  Today's Date: 5/3/2023    Admit Date: 5/2/2023    Plan: Home with Family   Discharge Needs Assessment     Row Name 05/03/23 1602       Living Environment    People in Home spouse    Name(s) of People in Home Edin Blancas-     Current Living Arrangements home    Primary Care Provided by self    Provides Primary Care For no one    Family Caregiver if Needed spouse    Family Caregiver Names Edin Blancas- spouse    Quality of Family Relationships unable to assess    Able to Return to Prior Arrangements yes       Transition Planning    Patient/Family Anticipates Transition to home with family    Transportation Anticipated family or friend will provide       Discharge Needs Assessment    Readmission Within the Last 30 Days no previous admission in last 30 days    Equipment Currently Used at Home cpap    Concerns to be Addressed discharge planning    Current Discharge Risk chronically ill;physical impairment;financial support inadequate               Discharge Plan     Row Name 05/03/23 1605       Plan    Plan Home with Family    Patient/Family in Agreement with Plan yes    Plan Comments I have met with Ms. Blancas at the bedside today to initiate a discharge plan.  She states that she and her  live in a trailer in [place].  She reports that she is independent with activities of daily living and uses a CPAP at night.  She denies current receipt of home health /outpatient services.  She denies use of home O2 and is currently requiring 4LO2 via NC.  Ms. Blancas anticipates a referral for Cardiac Rehab and states that she plans to return home at discharge.  She denies difficulty affording/obtaining medications however has been having financial difficulty with her rent.  CM will cont to follow the evolving plan of care, set up home O2 if needed and assist with discharge needs as recommendations become  available.    Final Discharge Disposition Code 01 - home or self-care              Continued Care and Services - Admitted Since 5/2/2023    Coordination has not been started for this encounter.          Demographic Summary     Row Name 05/03/23 1553       General Information    Admission Type inpatient    Arrived From home    Referral Source admission list    Reason for Consult discharge planning    General Information Comments i have confirmed with Mrs. daniels that her PCP is MARK Estrada and her insurance is [employer].       Contact Information    Permission Granted to Share Info With                Functional Status     Row Name 05/03/23 8769       Functional Status, IADL    Medications independent    Meal Preparation independent    Housekeeping independent    Laundry independent    Shopping independent               Psychosocial    No documentation.                Abuse/Neglect    No documentation.                Legal    No documentation.                Substance Abuse    No documentation.                Patient Forms    No documentation.                   Vania Wallace RN

## 2023-05-03 NOTE — PROGRESS NOTES
"  [facility]  PROGRESS NOTE    Date of Admission: 5/2/2023  Date of Service: 05/03/23    Primary Care Physician: Leeanna Tovar APRN    Chief Complaint: f/u CAD with chest pain     Subjective      Has sharp pleuritic type chest pain, constant but worse with deep inspiration. \"Hurts in her lungs.\" She is on 4L NC and was previously not requiring supplemental O2.  at bedside       Objective   Vitals: /71 (BP Location: Right arm, Patient Position: Lying)   Pulse 71   Temp 98.7 °F (37.1 °C) (Oral)   Resp 18   Ht 177.8 cm (70\")   Wt 129 kg (284 lb 6.3 oz)   SpO2 95%   BMI 40.81 kg/m²     Physical Exam:  GENERAL: Alert, cooperative, in no acute distress.   HEENT: Normocephalic, no jugular venous distention  HEART: Regular rhythm, normal rate, and no murmurs, gallops, or rubs.   LUNGS:  Poor inspiratory effort. No wheezing, or rhonchi. Bibasilar crackles, 94% on 4L NC   NEUROLOGIC: No focal abnormalities involving strength or sensation are noted.   EXTREMITIES: No clubbing, cyanosis, or edema noted.     Results:  Results from last 7 days   Lab Units 05/03/23  0401 05/02/23  1210   WBC 10*3/mm3 9.35 12.57*   HEMOGLOBIN g/dL 12.1 13.1   HEMATOCRIT % 39.2 41.1   PLATELETS 10*3/mm3 291 313     Results from last 7 days   Lab Units 05/03/23  0401 05/02/23  1210 05/02/23  1210 05/02/23  1206   SODIUM mmol/L 139  --  134*  --    POTASSIUM mmol/L 3.9  --  3.9  --    CHLORIDE mmol/L 105   < > 101  --    CO2 mmol/L 22.0  --  20.0*  --    BUN mg/dL 10  --  16  --    CREATININE mg/dL 0.58  --  0.58 0.40*   GLUCOSE mg/dL 103*  --  190*  --     < > = values in this interval not displayed.      Lab Results   Component Value Date    CHOL 154 05/03/2023    TRIG 258 (H) 05/03/2023    HDL 34 (L) 05/03/2023    LDL 78 05/03/2023    AST 15 05/02/2023    ALT 17 05/02/2023     Results from last 7 days   Lab Units 05/03/23  0401   HEMOGLOBIN A1C % 9.60*     Results from last 7 days   Lab " Units 05/03/23  0401   CHOLESTEROL mg/dL 154   TRIGLYCERIDES mg/dL 258*   HDL CHOL mg/dL 34*   LDL CHOL mg/dL 78       Results from last 7 days   Lab Units 05/02/23  1830 05/02/23  1210   HSTROP T ng/L 19* 7     Results from last 7 days   Lab Units 05/02/23  1210   PROBNP pg/mL 138.4       No intake or output data in the 24 hours ending 05/03/23 0852    I personally reviewed the patient's EKG/Telemetry data    Radiology Data:   [facility] 5/2/23:  Conclusion       •  80% hazy OM 2 branch stenosis status post intervention with a Xience Skypoint 2.5 x 15 mm drug-eluting stent.  •  Patent LIMA to LAD.  Patent free radial to a diseased, small caliber diagonal branch with an 80% mid segment stenosis.  50% mid RCA stenosis with an RFR of 0.97.  •  LV pressures (S/D/E) 5 mmHg     Recommendations    •       1. Admission to telemetry  2. Echo, right lower extremity venous duplex, EKG, chest x-ray, labs ordered  3. Continue DAPT, P2 Y12 level in the morning  4. Continue diltiazem, beta-blocker, statin, fenofibrate  5. Insulin with insulin sliding scale.  Holding off glipizide  6. Continue home Paxil and Protonix  7. Dr. Temple to resume cardiac care tomorrow morning     RLE venous duplex 5/2/23:  Interpretation Summary       •  Normal right lower extremity venous duplex scan.      CXR 5/2/23:  IMPRESSION:  Impression:  Borderline changes of congestive heart failure. Mild bibasilar discoid atelectasis.       Current Medications:  aspirin, 81 mg, Oral, Daily  atorvastatin, 80 mg, Oral, Nightly  clopidogrel, 75 mg, Oral, Daily  dilTIAZem, 120 mg, Oral, Q12H  fenofibrate, 145 mg, Oral, Daily  insulin detemir, 30 Units, Subcutaneous, Q12H  insulin lispro, 0-14 Units, Subcutaneous, TID AC  metoprolol tartrate, 25 mg, Oral, BID  pantoprazole, 40 mg, Oral, Daily  PARoxetine, 40 mg, Oral, QAM  pharmacy consult - [place], , Does not apply, Daily           Assessment and Plan:     1. Acute/subacute chest pain, possible STEMI  a. Subtle ST  elevation in the inferior leads, worse than prior EKG  b. No clear culprit lesion on initial LHC.    c. Hazy 80% ostial OM 2 stenosis, status post ERNESTO x1.    d. Patent LIMA to LAD.  Patent radial to diagonal branch with a distal 80% diagonal branch stenosis in a small caliber vessel. Intermediate RCA lesion with normal functional assessment by RFR of 0.97   e. P2Y12 level acceptable  f. Echo shows normal ejection fraction with normal wall motion.  g. Chest pain is pleuritic in nature - could be post-pericardiotomy syndrome, will start Colchicine daily.  I reviewed her cath films unlikely to be related to coronary stenosis as she had successful PCI of the OM and adequate revascularization of other territories.  2. CAD  a. S/p 2V CABG at [facility] 3/2023  3. Right leg swelling   1. Normal RLE venus duplex 5/2/23  4. Hypertension  5. Dyslipidemia  6. Diabetes, uncontrolled   7. Obesity  8. Respiratory insufficiency  1. Requiring 4L NC with CXR with some CHF changes  2. Will start IV Lasix 40mg daily and obtain CTA chest to rule out PE       New, worsening chest pain with movement today, also shortness of breath started acutely, and concern for pulmonary embolus, urgent CT of the chest ordered.  We will also treat her pleuritic pain with colchicine, she also has pulmonary congestion on chest x-ray which we will treat with 40 mg IV Lasix and check BMP in the morning.  She has hypoxia on exam with 4 L nasal cannula.  I personally reviewed and interpreted her cardiac catheterization films as well as BMP, troponin, chest x-ray images, and I discussed her management with Dr. Rene yesterday.    Electronically signed by Yanira Arias PA-C, 05/03/23, 8:58 AM EDT.    I have seen and examined the patient, performing a face-to-face diagnostic evaluation with plan of care reviewed and developed with the Advanced Practice Clinician and nursing staff. I have addended and modified the above history of present illness, physical  examination, and assessment and plan to reflect my findings and impressions. > 50% of the combined encounter time was performed by Dr. Temple.    Saeed Temple MD, [facility], [facility]  05/03/23

## 2023-05-04 ENCOUNTER — READMISSION MANAGEMENT (OUTPATIENT)
Dept: CALL CENTER | Facility: HOSPITAL | Age: 58
End: 2023-05-04
Payer: MEDICAID

## 2023-05-04 VITALS
SYSTOLIC BLOOD PRESSURE: 139 MMHG | OXYGEN SATURATION: 88 % | TEMPERATURE: 98.6 F | DIASTOLIC BLOOD PRESSURE: 96 MMHG | HEIGHT: 70 IN | BODY MASS INDEX: 40.71 KG/M2 | WEIGHT: 284.39 LBS | HEART RATE: 59 BPM | RESPIRATION RATE: 18 BRPM

## 2023-05-04 PROBLEM — R07.81 PLEURITIC CHEST PAIN: Status: ACTIVE | Noted: 2023-05-04

## 2023-05-04 PROBLEM — R06.02 SHORTNESS OF BREATH: Status: ACTIVE | Noted: 2023-05-04

## 2023-05-04 PROBLEM — I21.4 NSTEMI, INITIAL EPISODE OF CARE: Status: ACTIVE | Noted: 2023-05-02

## 2023-05-04 LAB
GLUCOSE BLDC GLUCOMTR-MCNC: 119 MG/DL (ref 70–130)
GLUCOSE BLDC GLUCOMTR-MCNC: 187 MG/DL (ref 70–130)

## 2023-05-04 PROCEDURE — 63710000001 INSULIN DETEMIR PER 5 UNITS: Performed by: HOSPITALIST

## 2023-05-04 PROCEDURE — 82948 REAGENT STRIP/BLOOD GLUCOSE: CPT

## 2023-05-04 PROCEDURE — 25010000002 FUROSEMIDE PER 20 MG: Performed by: INTERNAL MEDICINE

## 2023-05-04 PROCEDURE — 99239 HOSP IP/OBS DSCHRG MGMT >30: CPT | Performed by: INTERNAL MEDICINE

## 2023-05-04 RX ORDER — CLOPIDOGREL BISULFATE 75 MG/1
75 TABLET ORAL DAILY
Qty: 30 TABLET | Refills: 11 | Status: SHIPPED | OUTPATIENT
Start: 2023-05-04

## 2023-05-04 RX ORDER — COLCHICINE 0.6 MG/1
TABLET ORAL
Qty: 21 TABLET | Refills: 0 | Status: SHIPPED | OUTPATIENT
Start: 2023-05-04 | End: 2023-05-18

## 2023-05-04 RX ORDER — FUROSEMIDE 40 MG/1
40 TABLET ORAL DAILY
Qty: 30 TABLET | Refills: 2 | Status: SHIPPED | OUTPATIENT
Start: 2023-05-04

## 2023-05-04 RX ADMIN — HYDROCODONE BITARTRATE AND ACETAMINOPHEN 1 TABLET: 5; 325 TABLET ORAL at 12:26

## 2023-05-04 RX ADMIN — INSULIN DETEMIR 30 UNITS: 100 INJECTION, SOLUTION SUBCUTANEOUS at 10:08

## 2023-05-04 RX ADMIN — PAROXETINE HYDROCHLORIDE 40 MG: 20 TABLET, FILM COATED ORAL at 10:07

## 2023-05-04 RX ADMIN — FENOFIBRATE 145 MG: 145 TABLET ORAL at 10:07

## 2023-05-04 RX ADMIN — PANTOPRAZOLE SODIUM 40 MG: 40 TABLET, DELAYED RELEASE ORAL at 10:07

## 2023-05-04 RX ADMIN — COLCHICINE 0.6 MG: 0.6 TABLET, FILM COATED ORAL at 10:07

## 2023-05-04 RX ADMIN — HYDROCODONE BITARTRATE AND ACETAMINOPHEN 1 TABLET: 5; 325 TABLET ORAL at 05:56

## 2023-05-04 RX ADMIN — CLOPIDOGREL BISULFATE 75 MG: 75 TABLET ORAL at 10:07

## 2023-05-04 RX ADMIN — ASPIRIN 81 MG: 81 TABLET, COATED ORAL at 10:07

## 2023-05-04 RX ADMIN — FUROSEMIDE 40 MG: 10 INJECTION, SOLUTION INTRAMUSCULAR; INTRAVENOUS at 10:06

## 2023-05-04 NOTE — CASE MANAGEMENT/SOCIAL WORK
Case Management Discharge Note      Final Note: Mrs. Blancas will be discharged today.  I have arranged continuous home oxygen set up @ 2L cont with [facility] and confirmed with Juan that he will deliver portable tank to bedside today.  Mrs. Blancas denies having any discharge needs and states that her  will assist her at home and provide transportation home today.         Selected Continued Care - Admitted Since 5/2/2023     Destination    No services have been selected for the patient.              Durable Medical Equipment Coordination complete.    Service Provider Selected Services Address Phone Fax Patient Preferred    [facility] [address], [place] [id number] [phone] --          Dialysis/Infusion    No services have been selected for the patient.              Home Medical Care    No services have been selected for the patient.              Therapy    No services have been selected for the patient.              Community Resources    No services have been selected for the patient.              Community & DME    No services have been selected for the patient.                       Final Discharge Disposition Code: 01 - home or self-care

## 2023-05-04 NOTE — DISCHARGE SUMMARY
[facility] at [facility]  DISCHARGE SUMMARY       Date of Admission: 5/2/2023  Date of Discharge:  5/4/2023  Primary Care Physician: Leeanna Tovar APRN  Attending Physician: Saeed Temple MD  Consulting Physician: Dr. Korey Rene     Discharge Diagnoses:  Active Hospital Problems    Diagnosis  POA   • **NSTEMI, initial episode of care [I21.4]  Yes   • Shortness of breath [R06.02]  Yes   • Pleuritic chest pain [R07.81]  Yes   • Coronary artery disease of native artery of native heart with stable angina pectoris [I25.118]  Yes     3/7/23: 2V cabg with free radial and lewis at [facility] dr. [place].     • Hypertension [I10]  Yes   • Class 3 obesity with alveolar hypoventilation and body mass index (BMI) of 40.0 to 44.9 in adult [E66.2, Z68.41]  Not Applicable       Hospital Course:   Patient is a 57 y.o. female with recent CABG x2 at [facility] March 2023, HTN, HLD, diabetes who presented with worsening chest pain for the past few days. EKG on arrival showed subtle ESTEVAN in inferior leads, slightly worse than baseline EKG. She was taken urgently to the cath lab where she was found to have 80% hazy OM2 stenosis, s/p ERNESTO, patent LIMA to LAD and patent free radial to diseased small diagonal branch. 50% mid RCA with normal RFR. Echo showed normal EF, small <1cm pericardial effusion. Post-procedure she described symptoms of pleuritic chest pain with new oxygen requirement and bibasilar crackles. She was treated with IV Lasix and Colchicine with improvement. CTA chest was negative for PE, trace bilateral pleural effusions, mild bibasilar atelectasis. She improved with above treatment and was felt stable and ready for discharge home on 5/4 with Colchicine taper over 2 weeks, and Lasix 40mg daily with repeat BMP in 2 weeks. She will follow up in our office in 6-8 weeks.     Procedures Performed  Procedure(s):  Left Heart Cath 5/2/23:  Conclusion       •  80% hazy OM 2 branch stenosis status post  intervention with a Xience Skypoint 2.5 x 15 mm drug-eluting stent.  •  Patent LIMA to LAD.  Patent free radial to a diseased, small caliber diagonal branch with an 80% mid segment stenosis.  50% mid RCA stenosis with an RFR of 0.97.  •  LV pressures (S/D/E) 5 mmHg     Recommendations    •       1. Admission to telemetry  2. Echo, right lower extremity venous duplex, EKG, chest x-ray, labs ordered  3. Continue DAPT, P2 Y12 level in the morning  4. Continue diltiazem, beta-blocker, statin, fenofibrate  5. Insulin with insulin sliding scale.  Holding off glipizide  6. Continue home Paxil and Protonix  7. Dr. Temple to resume cardiac care tomorrow morning       Pertinent Test Results:   Echo 5/2/23:  Interpretation Summary       •  Left ventricular systolic function is normal. Calculated left ventricular EF = 57.2%  •  Left ventricular diastolic function was normal.  •  Estimated right ventricular systolic pressure from tricuspid regurgitation is normal (<35 mmHg).  •  There is a small (<1cm) circumferential pericardial effusion.     RLE Venous duplex 5/2/23:  Interpretation Summary       •  Normal right lower extremity venous duplex scan.    CTA Chest 5/3/23:  IMPRESSION:  Impression:     1. No evidence of pulmonary embolic disease.     2. Relatively mild bibasilar discoid atelectasis. No particular features are seen to suggest that this represents pneumonia.     3. Very small pericardial effusion and trace bilateral pleural effusion.     4. Generalized ectasia of the ascending aorta to approximately 4.3 cm. At this diameter, this should be followed for stability.    Lab Results   Component Value Date    WBC 9.35 05/03/2023    HGB 12.1 05/03/2023    HCT 39.2 05/03/2023    MCV 83.6 05/03/2023     05/03/2023     Lab Results   Component Value Date    GLUCOSE 103 (H) 05/03/2023    BUN 10 05/03/2023    CREATININE 0.58 05/03/2023    EGFR 107.0 05/03/2023    BCR 17.2 05/03/2023    K 3.9 05/03/2023    CO2 22.0  "05/03/2023    CALCIUM 8.8 05/03/2023    ALBUMIN 3.9 05/02/2023    BILITOT 0.5 05/02/2023    AST 15 05/02/2023    ALT 17 05/02/2023     Lab Results   Component Value Date    HGBA1C 9.60 (H) 05/03/2023     Lab Results   Component Value Date    CHOL 154 05/03/2023    TRIG 258 (H) 05/03/2023    HDL 34 (L) 05/03/2023    LDL 78 05/03/2023     Lab Results   Component Value Date    TSH 1.020 03/06/2023     Lab Results   Component Value Date    TROPONINT 19 (H) 05/02/2023     proBNP 138 5/2/23    Physical Exam on Discharge:/55 (BP Location: Right arm, Patient Position: Lying)   Pulse 52   Temp 98.6 °F (37 °C) (Oral)   Resp 18   Ht 177.8 cm (70\")   Wt 129 kg (284 lb 6.3 oz)   SpO2 92%   BMI 40.81 kg/m²     General Appearance No acute distress   Neck No adenopathy, supple, trachea midline, no JVD   Lungs Clear to auscultation,respirations regular, even and unlabored   Heart Regular rhythm and normal rate, normal S1 and S2, no murmur, no gallop, no rub, no click   Chest wall No abnormalities observed   Abdomen Normal bowel sounds, no masses, no hepatomegaly, soft   Extremities Moves all extremities well, no edema, no cyanosis, no redness   Neurological Alert and oriented x 3     Discharge Medications     Discharge Medications      New Medications      Instructions Start Date   colchicine 0.6 MG tablet   Take 1 tablet by mouth 2 (Two) Times a Day for 7 days, THEN 1 tablet Daily for 7 days.   Start Date: May 4, 2023     furosemide 40 MG tablet  Commonly known as: LASIX   40 mg, Oral, Daily         Continue These Medications      Instructions Start Date   acetaminophen 650 MG 8 hr tablet  Commonly known as: TYLENOL   650 mg, Oral, Every 8 Hours PRN      aspirin 81 MG EC tablet   81 mg, Oral, Daily      atorvastatin 80 MG tablet  Commonly known as: LIPITOR   80 mg, Oral, Daily      BASAGLAR KWIKPEN 100 UNIT/ML injection pen   40 Units, Subcutaneous, 2 Times Daily      clopidogrel 75 MG tablet  Commonly known as: " PLAVIX   75 mg, Oral, Daily      dilTIAZem  MG 24 hr capsule  Commonly known as: CARDIZEM CD   120 mg, Oral, Daily      fenofibrate 145 MG tablet  Commonly known as: TRICOR   145 mg, Oral, Daily      glipizide 10 MG tablet  Commonly known as: GLUCOTROL   10 mg, Oral, 2 Times Daily Before Meals      metoprolol tartrate 25 MG tablet  Commonly known as: LOPRESSOR   25 mg, Oral, 2 Times Daily      pantoprazole 40 MG EC tablet  Commonly known as: PROTONIX   40 mg, Oral, Daily      PARoxetine 40 MG tablet  Commonly known as: PAXIL   40 mg, Oral, Every Morning      Pen Needles 31G X 8 MM misc   1 each, Does not apply, 2 Times Daily, For Diabetes: E11.43           Discharge Diet: cardiac, consistent carb     Activity at Discharge: as tolerated     Condition on Discharge: stable    Follow-up Appointments  Future Appointments   Date Time Provider Department Center   6/26/2023  9:30 AM Leeanna Tovar APRN [facility]   8/15/2023  8:00 AM Salena Chamberlain DO [facility]   11/16/2023 11:15 AM Saeed Temple MD [facility]     Additional Instructions for the Follow-ups that You Need to Schedule     Ambulatory Referral to Cardiac Rehab   As directed      Discharge Follow-up with Specified Provider: with Dr. Temple or Yanira Arias in 6-8 weeks; 6 Weeks   As directed      To: with Dr. Temple or Yanira Arias in 6-8 weeks    Follow Up: 6 Weeks         Basic Metabolic Panel    May 18, 2023 (Approximate)      Release to patient: Routine Release                     Time: Discharge >30  min   I personally spent 35 minutes discharging this patient, which included reviewing pertinent data and graphics, as well as discussing the plan with the patient, their family, and the nursing staff.      Scribed for Saeed Huber MD by Yanira Arias PA-C. 5/4/2023  08:54 EDT    ISaeed M.D., personally performed the services described in this documentation as scribed by the above named  individual in my presence, and it is both accurate and complete.    Saeed Temple MD, FAC, [facility]  05/04/23  12:57 EDT

## 2023-05-04 NOTE — OUTREACH NOTE
Prep Survey    Flowsheet Row Responses   [facility] facility patient discharged from? [place]   Is LACE score < 7 ? Yes   Eligibility [facility]   Date of Admission 05/02/23   Date of Discharge 05/04/23   Discharge Disposition Home or Self Care   Discharge diagnosis NSTEMI   Does the patient have one of the following disease processes/diagnoses(primary or secondary)? Acute MI (STEMI,NSTEMI)   Does the patient have Home health ordered? No   Is there a DME ordered? Yes   What DME was ordered? [facility] for oxygen   Prep survey completed? Yes          JOAQUIN URIAS - Registered Nurse

## 2023-05-04 NOTE — PLAN OF CARE
Goal Outcome Evaluation:           Progress: improving   Pt is A&O. BP a little soft. Pt was on 4L NC w/ humidity. SBA to bathroom. Pt is continent. Dc'd the external cath.   On assessment the pt's pulse ox was changed. O2 SAT was immediately improved. Old Pulse ox was moist and loose on finger. Turned pt down to 3L and stayed to evaluate. Titrated it down to 2L NC almost immediately. Pt did well, with SAT going from 96 down to 94. Later in shift, O2 was titrated down to 1L. Pt stayed 92-93%.

## 2023-05-05 ENCOUNTER — TRANSITIONAL CARE MANAGEMENT TELEPHONE ENCOUNTER (OUTPATIENT)
Dept: CALL CENTER | Facility: HOSPITAL | Age: 58
End: 2023-05-05
Payer: MEDICAID

## 2023-05-05 NOTE — OUTREACH NOTE
Call Center TCM Note    Flowsheet Row Responses   [facility] patient discharged from? [place]   Does the patient have one of the following disease processes/diagnoses(primary or secondary)? Acute MI (STEMI,NSTEMI)   TCM attempt successful? Yes   Call start time 1620   Call end time 1626   Discharge diagnosis NSTEMI, heart cath, shortness of breath, pleuritic chest pain   Person spoke with today (if not patient) and relationship patient   Meds reviewed with patient/caregiver? Yes   Does the patient have all prescriptions related to this admission filled (includes statins,anticoagulants,HTN meds,anti-arrhythmia meds) No   What is keeping the patient from filling the prescriptions? Prior authorization Issues  [Patient reports pharmacy told her having difficulties with insurance not wanting to cover the colchicine. ]   Nursing Interventions Nurse advised patient to call provider  [Advised to contact cardiology office. ]   Is the patient taking all medications as directed (includes completed medication regime)? No   What is preventing the patient from taking all medications as directed? Other  [see above. ]   Comments PCP MARK Estrada [facility] FOLLOW UP 5/11/2023  8:30 AM   Does the patient have an appointment with their PCP within 7 days of discharge? Yes   Has home health visited the patient within 72 hours of discharge? N/A   What DME was ordered? [facility] for oxygen   Has all DME been delivered? Yes   DME comments Patient reports that she has portable O2 to use and [employer] is supposed to come today and set up home concentrator.    Psychosocial issues? No   Did the patient receive a copy of their discharge instructions? Yes   Nursing interventions Reviewed instructions with patient   What is the patient's perception of their health status since discharge? Improving   Nursing interventions Nurse provided patient education   Is the patient/caregiver able to teach back signs and symptoms of when to call  for help immediately: Sudden chest discomfort, Shortness of breath at any time   Is the patient/caregiver able to teach back ways to prevent a second heart attack: Take medications, Follow up with MD   If the patient is a current smoker, are they able to teach back resources for cessation? Not a smoker   Is the patient/caregiver able to teach back the hierarchy of who to call/visit for symptoms/problems? PCP, Specialist, Home health nurse, Urgent Care, ED, 911 Yes   TCM call completed? Yes   Wrap up additional comments Cardiology appt Tuesday May 9, 2023 1:30 PM   Call end time 1626   Would this patient benefit from a Referral to [facility] Social Work? No   Is the patient interested in additional calls from an ambulatory ?  NOTE:  applies to high risk patients requiring additional follow-up. No          Bonny Chin RN    5/5/2023, 16:29 EDT

## 2023-05-05 NOTE — OUTREACH NOTE
Call Center TCM Note    Flowsheet Row Responses   [facility] patient discharged from? [place]   Does the patient have one of the following disease processes/diagnoses(primary or secondary)? Acute MI (STEMI,NSTEMI)   TCM attempt successful? No   Unsuccessful attempts Attempt 1  [Attempted to reach patient and spouse, Edin, listed on PCP verbal release. No answer]          Bonny Chin RN    5/5/2023, 15:16 EDT

## 2023-05-08 LAB
QT INTERVAL: 406 MS
QT INTERVAL: 432 MS
QT INTERVAL: 464 MS
QTC INTERVAL: 435 MS
QTC INTERVAL: 447 MS
QTC INTERVAL: 459 MS

## 2023-05-09 ENCOUNTER — PRIOR AUTHORIZATION (OUTPATIENT)
Dept: CARDIOLOGY | Facility: CLINIC | Age: 58
End: 2023-05-09
Payer: MEDICAID

## 2023-05-09 ENCOUNTER — OFFICE VISIT (OUTPATIENT)
Dept: CARDIOLOGY | Facility: HOSPITAL | Age: 58
End: 2023-05-09
Payer: MEDICAID

## 2023-05-09 VITALS
SYSTOLIC BLOOD PRESSURE: 138 MMHG | WEIGHT: 279 LBS | HEIGHT: 70 IN | HEART RATE: 72 BPM | RESPIRATION RATE: 16 BRPM | TEMPERATURE: 97.3 F | BODY MASS INDEX: 39.94 KG/M2 | DIASTOLIC BLOOD PRESSURE: 72 MMHG | OXYGEN SATURATION: 97 %

## 2023-05-09 DIAGNOSIS — I10 PRIMARY HYPERTENSION: ICD-10-CM

## 2023-05-09 DIAGNOSIS — I25.10 CORONARY ARTERY DISEASE INVOLVING NATIVE CORONARY ARTERY OF NATIVE HEART WITHOUT ANGINA PECTORIS: Primary | ICD-10-CM

## 2023-05-09 DIAGNOSIS — Z95.5 STENTED CORONARY ARTERY: Primary | ICD-10-CM

## 2023-05-09 NOTE — PROGRESS NOTES
Chief Complaint  Establish Care and Chest Pain    Subjective      History of Present Illness {CC  Problem List  Visit  Diagnosis   Encounters  Notes  Medications  Labs  Result Review Imaging  Media :23}     Estefani Blancas, 57 y.o. female with recent CABG x2 at [facility] March 2023, HTN, HLD, diabetes  presents to [facility] for Establish Care and Chest Pain.    Patient recently hospitalized at [facility] after presenting with worsening chest pain for the past few days. EKG on arrival showed subtle ESTEVAN in inferior leads, slightly worse than baseline EKG. She was taken urgently to the cath lab where she was found to have 80% hazy OM2 stenosis, s/p ERNESTO, patent LIMA to LAD and patent free radial to diseased small diagonal branch. 50% mid RCA with normal RFR. Echo showed normal EF, small <1cm pericardial effusion. Post-procedure she described symptoms of pleuritic chest pain with new oxygen requirement and bibasilar crackles. She was treated with IV Lasix and Colchicine with improvement. CTA chest was negative for PE, trace bilateral pleural effusions, mild bibasilar atelectasis. She improved with above treatment and was felt stable and ready for discharge home on 5/4 with Colchicine taper over 2 weeks, and Lasix 40mg daily with repeat BMP in 2 weeks.  Scheduled to follow-up with Dr. Temple 6/15/2023.    Presents today with continued chest pain with deep inspiration and lying flat. Denies exertional chest pain, complications at recent [facility] access site. Compliant with cardiac medications prescribed at discharge, but insurance coverage issues with cholchicine. Denies sustained tachypalpitation, near syncope/syncope.         Objective     Vital Signs:   Vitals:    05/09/23 1339 05/09/23 1341 05/09/23 1343   BP: 144/79 143/76 138/72   BP Location: Right arm Left arm Left arm   Patient Position: Sitting Sitting Standing   Cuff Size: Adult Adult Adult   Pulse: 62 60 72   Resp: 16   "   Temp: 97.3 °F (36.3 °C)     TempSrc: Temporal     SpO2: 97%     Weight: 127 kg (279 lb)     Height: 177.8 cm (70\")       Body mass index is 40.03 kg/m².  Physical Exam  Vitals and nursing note reviewed.   Constitutional:       Appearance: Normal appearance.   HENT:      Head: Normocephalic.   Eyes:      Extraocular Movements: Extraocular movements intact.   Neck:      Vascular: No carotid bruit.   Cardiovascular:      Rate and Rhythm: Normal rate and regular rhythm.      Pulses: Normal pulses.      Heart sounds: Normal heart sounds, S1 normal and S2 normal. No murmur heard.  Pulmonary:      Effort: Pulmonary effort is normal. No respiratory distress.      Breath sounds: Normal breath sounds.      Comments: O2 per nasal cannula  Musculoskeletal:      Cervical back: Neck supple.      Right lower leg: No edema.      Left lower leg: No edema.   Skin:     General: Skin is warm and dry.   Neurological:      General: No focal deficit present.      Mental Status: She is alert.   Psychiatric:         Mood and Affect: Mood normal.         Behavior: Behavior normal.         Thought Content: Thought content normal.          Data Reviewed:{ Labs  Result Review  Imaging  Med Tab  Media :23}     D-dimer, Quantitative (05/03/2023 04:01)  Lipid Panel (05/03/2023 04:01)  Hemoglobin A1c (05/03/2023 04:01)  CBC (No Diff) (05/03/2023 04:01)  Basic Metabolic Panel (05/03/2023 04:01)  ECG 12 Lead Chest Pain (05/03/2023 09:37)  Duplex Venous Lower Extremity - Right CAR (05/02/2023 16:36)  Adult Transthoracic Echo Complete w/ Color, Spectral and Contrast if Necessary Per Protocol (05/02/2023 15:27)  Cardiac Catheterization/Vascular Study (05/02/2023 14:16)      Assessment & Plan   Assessment and Plan {CC Problem List  Visit Diagnosis  ROS  Review (Popup)  Health Maintenance  Quality  BestPractice  Medications  SmartSets  SnapShot Encounters  Media :23}     1. Coronary artery disease involving native coronary artery of " native heart without angina pectoris  -s/p recent PCI and previous CABG  -Continue DAPT, atorvastatin, metoprolol tartrate, diltiazem  -Pericarditis symptoms continue, insurance issues with colchicine coverage.  Will discuss with primary cardiology team is it appears prior authorization has been sent to their office.  -TTE during hospitalization with LVEF 57%  -Appears well compensated, euvolemic on exam  -Continue routine cardiology follow-up as scheduled  -BMP later this week at PCP appointment    2. Primary hypertension  -Slightly elevated today  -SBP less than 130 on home monitoring  -Continue diltiazem, metoprolol tartrate  -Continue ambulatory blood pressure monitoring    3. Respiratory insufficiency  - Continues on 2L nasl cannula  -Scheduled with pulmonary next week        Follow Up {Instructions Charge Capture  Follow-up Communications :23}     Return if symptoms worsen or fail to improve.      Patient was given instructions and counseling regarding her condition or for health maintenance advice. Please see specific information pulled into the AVS if appropriate.  Patient was instructed to call the [facility] Center with any questions, concerns, or worsening symptoms.    Dictated Utilizing Dragon Dictation   Please note that portions of this note were completed with a voice recognition program.   Part of this note may be an electronic transcription/translation of spoken language to printed text using the Dragon Dictation System.

## 2023-05-09 NOTE — TELEPHONE ENCOUNTER
The request has been approved. The authorization is effective for a maximum of 12 fills from 05/09/2023 to 05/08/2024, as long as the member is enrolled in their current health plan. The request was approved as submitted. A written notification letter will follow with additional details.    Called pt to notify her off approval. LVM stating PA approved.

## 2023-05-11 ENCOUNTER — LAB (OUTPATIENT)
Dept: LAB | Facility: HOSPITAL | Age: 58
End: 2023-05-11
Payer: MEDICAID

## 2023-05-11 ENCOUNTER — READMISSION MANAGEMENT (OUTPATIENT)
Dept: CALL CENTER | Facility: HOSPITAL | Age: 58
End: 2023-05-11
Payer: MEDICAID

## 2023-05-11 ENCOUNTER — OFFICE VISIT (OUTPATIENT)
Dept: INTERNAL MEDICINE | Facility: CLINIC | Age: 58
End: 2023-05-11
Payer: MEDICAID

## 2023-05-11 ENCOUNTER — HOSPITAL ENCOUNTER (OUTPATIENT)
Dept: GENERAL RADIOLOGY | Facility: HOSPITAL | Age: 58
Discharge: HOME | End: 2023-05-11
Payer: MEDICAID

## 2023-05-11 VITALS
RESPIRATION RATE: 20 BRPM | TEMPERATURE: 96.6 F | HEART RATE: 76 BPM | SYSTOLIC BLOOD PRESSURE: 130 MMHG | HEIGHT: 70 IN | BODY MASS INDEX: 40.89 KG/M2 | DIASTOLIC BLOOD PRESSURE: 84 MMHG | WEIGHT: 285.6 LBS

## 2023-05-11 DIAGNOSIS — I31.9 PERICARDITIS, UNSPECIFIED CHRONICITY, UNSPECIFIED TYPE: ICD-10-CM

## 2023-05-11 DIAGNOSIS — I25.10 CORONARY ARTERY DISEASE INVOLVING NATIVE CORONARY ARTERY OF NATIVE HEART WITHOUT ANGINA PECTORIS: ICD-10-CM

## 2023-05-11 DIAGNOSIS — I25.118 CORONARY ARTERY DISEASE OF NATIVE ARTERY OF NATIVE HEART WITH STABLE ANGINA PECTORIS: ICD-10-CM

## 2023-05-11 DIAGNOSIS — R91.1 RIGHT MIDDLE LOBE PULMONARY NODULE: ICD-10-CM

## 2023-05-11 DIAGNOSIS — I10 PRIMARY HYPERTENSION: ICD-10-CM

## 2023-05-11 DIAGNOSIS — Z12.31 ENCOUNTER FOR SCREENING MAMMOGRAM FOR MALIGNANT NEOPLASM OF BREAST: ICD-10-CM

## 2023-05-11 DIAGNOSIS — R05.1 ACUTE COUGH: Primary | ICD-10-CM

## 2023-05-11 DIAGNOSIS — E78.5 HYPERLIPIDEMIA LDL GOAL <70: ICD-10-CM

## 2023-05-11 DIAGNOSIS — Z79.4 TYPE 2 DIABETES MELLITUS WITH DIABETIC AUTONOMIC NEUROPATHY, WITH LONG-TERM CURRENT USE OF INSULIN: ICD-10-CM

## 2023-05-11 DIAGNOSIS — E11.43 TYPE 2 DIABETES MELLITUS WITH DIABETIC AUTONOMIC NEUROPATHY, WITH LONG-TERM CURRENT USE OF INSULIN: ICD-10-CM

## 2023-05-11 DIAGNOSIS — R00.2 PALPITATIONS: ICD-10-CM

## 2023-05-11 DIAGNOSIS — F33.1 MODERATE EPISODE OF RECURRENT MAJOR DEPRESSIVE DISORDER: ICD-10-CM

## 2023-05-11 DIAGNOSIS — Z99.81 DEPENDENCE ON CONTINUOUS SUPPLEMENTAL OXYGEN: ICD-10-CM

## 2023-05-11 DIAGNOSIS — R05.1 ACUTE COUGH: ICD-10-CM

## 2023-05-11 LAB
ALBUMIN/CREATININE RATIO, URINE: <30
ANION GAP SERPL CALCULATED.3IONS-SCNC: 15 MMOL/L (ref 5–15)
BUN SERPL-MCNC: 17 MG/DL (ref 6–20)
BUN/CREAT SERPL: 18.9 (ref 7–25)
CALCIUM SPEC-SCNC: 9.5 MG/DL (ref 8.6–10.5)
CHLORIDE SERPL-SCNC: 103 MMOL/L (ref 98–107)
CO2 SERPL-SCNC: 22 MMOL/L (ref 22–29)
CREAT SERPL-MCNC: 0.9 MG/DL (ref 0.57–1)
EGFRCR SERPLBLD CKD-EPI 2021: 75.7 ML/MIN/1.73
EXPIRATION DATE: NORMAL
GLUCOSE SERPL-MCNC: 187 MG/DL (ref 65–99)
Lab: NORMAL
POC CREATININE URINE: 50
POC MICROALBUMIN URINE: 10
POTASSIUM SERPL-SCNC: 4.4 MMOL/L (ref 3.5–5.2)
SODIUM SERPL-SCNC: 140 MMOL/L (ref 136–145)

## 2023-05-11 PROCEDURE — 36415 COLL VENOUS BLD VENIPUNCTURE: CPT

## 2023-05-11 PROCEDURE — 80048 BASIC METABOLIC PNL TOTAL CA: CPT

## 2023-05-11 PROCEDURE — 71046 X-RAY EXAM CHEST 2 VIEWS: CPT

## 2023-05-11 NOTE — OUTREACH NOTE
AMI Week 2 Survey    Flowsheet Row Responses   [facility] facility patient discharged from? [place]   Does the patient have one of the following disease processes/diagnoses(primary or secondary)? Acute MI (STEMI,NSTEMI)   Week 2 attempt successful? No   Unsuccessful attempts Attempt 1  [had an appt with PCP today]          Lima FORRESTER - Registered Nurse

## 2023-05-11 NOTE — PROGRESS NOTES
Transitional Care Follow Up Visit  Subjective   Chief Complaint   Patient presents with   • Coronary Artery Disease     TCM, [facility] 5/2/23-5/4/23       Estefanitim Blancas is a 57 y.o. female who presents for a transitional care management visit.    Within 48 business hours after discharge our office contacted her via telephone to coordinate her care and needs.      I reviewed and discussed the details of that call along with the discharge summary, hospital problems, inpatient lab results, inpatient diagnostic studies, and consultation reports with Estefani.     Current outpatient and discharge medications have been reconciled for the patient.  Reviewed by: MARK Estrada          5/4/2023     5:54 PM   Date of TCM Phone Call   [facility]   Date of Admission 5/2/2023   Date of Discharge 5/4/2023   Discharge Disposition Home or Self Care     Risk for Readmission (LACE) Score: 6 (5/4/2023  6:00 AM)      History of Present Illness   Course During Hospital Stay:  Hospitalization per copied discharge summary as below:    Active Hospital Problems     Diagnosis   POA   • **NSTEMI, initial episode of care [I21.4]   Yes   • Shortness of breath [R06.02]   Yes   • Pleuritic chest pain [R07.81]   Yes   • Coronary artery disease of native artery of native heart with stable angina pectoris [I25.118]   Yes       3/7/23: 2V cabg with free radial and lewis at [facility] dr. Palomino.      • Hypertension [I10]   Yes   • Class 3 obesity with alveolar hypoventilation and body mass index (BMI) of 40.0 to 44.9 in adult [E66.2, Z68.41]   Not Applicable         Hospital Course:   Patient is a 57 y.o. female with recent CABG x2 at [facility] March 2023, HTN, HLD, diabetes who presented with worsening chest pain for the past few days. EKG on arrival showed subtle ESTEVAN in inferior leads, slightly worse than baseline EKG. She was taken urgently to the cath lab where she was found to have 80% hazy OM2 stenosis, s/p ERNESTO, patent LIMA to LAD and patent  free radial to diseased small diagonal branch. 50% mid RCA with normal RFR. Echo showed normal EF, small <1cm pericardial effusion. Post-procedure she described symptoms of pleuritic chest pain with new oxygen requirement and bibasilar crackles. She was treated with IV Lasix and Colchicine with improvement. CTA chest was negative for PE, trace bilateral pleural effusions, mild bibasilar atelectasis. She improved with above treatment and was felt stable and ready for discharge home on 5/4 with Colchicine taper over 2 weeks, and Lasix 40mg daily with repeat BMP in 2 weeks. She will follow up in our office in 6-8 weeks.   Taking Lasix 40 mg daily.     Procedures Performed  Procedure(s):  Left Heart Cath 5/2/23:  Conclusion        •  80% hazy OM 2 branch stenosis status post intervention with a Xience Skypoint 2.5 x 15 mm drug-eluting stent.  •  Patent LIMA to LAD.  Patent free radial to a diseased, small caliber diagonal branch with an 80% mid segment stenosis.  50% mid RCA stenosis with an RFR of 0.97.  •  LV pressures (S/D/E) 5 mmHg     Recommendations     •       1. Admission to telemetry  2. Echo, right lower extremity venous duplex, EKG, chest x-ray, labs ordered  3. Continue DAPT, P2 Y12 level in the morning  4. Continue diltiazem, beta-blocker, statin, fenofibrate  5. Insulin with insulin sliding scale.  Holding off glipizide  6. Continue home Paxil and Protonix  7. Dr. Temple to resume cardiac care tomorrow morning   *End of copy note    Post CABG   3/27/23: 2 vessell CABGG with free radial and lima at [facility] Dr. Palomino     . Her admission date to the hospital was 05/02/2023 and her discharge date from the hospital is 05/04/2023.      Coronary artery disease of bypass graft   The patient describes after being up since 1 AM on 05/02/2023 she felt like she had the pneumonia. She states after calling her primary care physician office and being informed to call emergency rescue. She reports she called emergency  rescue and while in the ambulance she was informed she was having a heart attack. She states her bypass graft looked okay.  She had a heart attack in a vessel that was not grafted. Left wrist is without hematoma.  Taking Plavix 75 mg daily, aspirin 81 mg daily, and atorvastatin 80 mg daily.  She is also on fenofibrate 145 mg, diltiazem 120 mg daily, and metoprolol 25 mg twice a day.     Chest Pain   The patient states she experiences intermittent dull and achy upper left under her arm chest pain since she has been discharged from the hospital. She adds she experiences right chest wall soreness; however, she associates her right side pain with her shoulder. She reports a tender sensation on her right side. She states she goes to cardiac rehab on 05/18/2023 for her evaluation. Concern for pericarditis and was prescribed colchicine by cardiology. She notes she was informed the lining of her heart is inflamed.  Due to difficulty with insurance coverage, she will pick this up today.  She states she has another appointment on 06/16/2023.         Palpitation  She reports experiencing palpitations 3 to 4 times a day lasting for a couple of seconds. She adds just long enough to take a breath and let it out.       Supplemental Oxygen   She reports she is out of her portable supplemental oxygen tanks. She adds when she is sitting at home, her oxygen levels drops between 87 and 88 percent. She notes she was discharged with portable supplemental oxygen tanks. She adds she was given one big tank, and three little tanks. She reports her oxygen levels are between 97 and 98 percent when she is on the supplemental oxygen. She states when she is up walking and doing activities, her oxygen levels drop low. She adds for the most part her oxygen levels are stable.     Pulmonary Nodule  The patient states she has an appointment with the  pulmonary nodule clinic on 05/16/2023.     Diabetes   She reports if she does not eat, her blood  glucose levels are in the range of 100 to 115 mg/dL. She adds the first thing in the morning before she eats, her blood glucose levels were 251 mg/dL. She notes her blood glucose levels on 05/10/2023 in the morning before she ate was 118 mg/dL. She adds she did not eat dinner the evening before. She reports she has completely changed her diet. She notes the highest her blood glucose levels have been before she eats anything first thing in the morning was 287 mg/dL. She reports she eats dinner between 5PM and 6 PM. She adds that's why she tries to take her morning medication between 6AM to 7AM. She states she utilizes 40 units of insulin long acting twice a day. She states she will take her insulin before she eats breakfast, and before she eats dinner. She notes she has tried metformin in the past, and was informed to discontinue the metformin after her first heart attack. She states she takes glipizide twice a day. She notes she was informed to continue her Lasix 40 mg daily. She reports she has an appointment with endocrinology on 08/2023.    Hypertension  She notes she has seen the cardiologist on 05/09/2023. Goal bp <130/80.     Cough   The patient states she is experiencing a cough since 05/10/2023. She notes she is experiencing a fever in the evening with chills. She adds she does not have any energy. She states she is experiencing diaphoresis for a couple of days. She denies coughing things up. She denies a burning sensation.     Neuropathy  The patient states she has a history of  neuropathy.    She states she is eating and drinking. She reports normal bowel movements. She does not report any fevers.    Health Maintenance   The patient denies the pneumonia vaccine.  She reports she does not know the last time she has a mammogram. She notes she needs to schedule a diabetic eye exam.     Family Hx   She states she was informed that her symptoms are hereditary. She notes her father, parental grandmother, and  parental uncle had a history of massive heart attacks.          The following portions of the patient's history were reviewed and updated as appropriate: allergies, current medications, past medical history, past social history, past surgical history and problem list.    Review of Systems   Constitutional: Positive for chills and diaphoresis.   Respiratory: Positive for cough and shortness of breath (with exertion, improves with oxygen).    Cardiovascular: Positive for chest pain (right chest wall soreness, left dull and aching ) and palpitations.       Objective   Physical Exam  Constitutional:       Appearance: She is diaphoretic.   HENT:      Nose: No rhinorrhea.   Cardiovascular:      Rate and Rhythm: Rhythm irregular.      Heart sounds: Normal heart sounds.   Pulmonary:      Breath sounds: Normal breath sounds.   Musculoskeletal:      Right lower leg: No edema.      Left lower leg: No edema.     CT Angiogram Chest (05/03/2023 16:05)  IMPRESSION:  Impression:     1. No evidence of pulmonary embolic disease.     2. Relatively mild bibasilar discoid atelectasis. No particular features are seen to suggest that this represents pneumonia.     3. Very small pericardial effusion and trace bilateral pleural effusion.     4. Generalized ectasia of the ascending aorta to approximately 4.3 cm. At this diameter, this should be followed for stability.           Duplex Venous Lower Extremity - Right CAR (05/02/2023 16:36)    Right sided additional findings: All RLE deep and superficial veins appear compressible at time of exam.     Study Impression    •     Right Common Femoral:  No deep vein thrombosis noted.   •     Right Saphenofemoral Junction:  No superficial thrombophlebitis noted.   •     Right Proximal Femoral: No deep vein thrombosis noted.   •     Right Mid Femoral: No deep vein thrombosis noted.   •     Right Distal Femoral: No deep vein thrombosis noted.   •     Right Popliteal: No deep vein thrombosis noted.   •      Right Posterior Tibial: No deep vein thrombosis noted.   •     Right Peroneal: No deep vein thrombosis noted.   •    Right Gastrocnemius: No deep vein thrombosis noted.   •     Right Great Saphenous Above Knee: No superficial thrombophlebitis noted.   •     Right Great Saphenous Below Knee: No superficial thrombophlebitis noted.       Adult Transthoracic Echo Complete w/ Color, Spectral and Contrast if Necessary Per Protocol (05/02/2023 15:27)  •  Left ventricular systolic function is normal. Calculated left ventricular EF = 57.2%  •  Left ventricular diastolic function was normal.  •  Estimated right ventricular systolic pressure from tricuspid regurgitation is normal (<35 mmHg).  •  There is a small (<1cm) circumferential pericardial effusion.    ECG 12 Lead    Date/Time: 5/14/2023 6:08 PM  Performed by: Leeanna Tovar APRN  Authorized by: Leeanna Tovar APRN   Comparison: compared with previous ECG from 5/3/2023  Rhythm: sinus bradycardia  Rate: normal  Conduction: conduction normal  QRS axis: normal  Other findings: non-specific ST-T wave changes    Clinical impression: abnormal EKG            Assessment & Plan   Diagnoses and all orders for this visit:      Advised the patient to monitor her blood glucose levels for the next two weeks, informed her if her levels are 200 mg/dL to let me know.         Transitional Care Management Certification  I certify that the following are true:  1. Communication was made within 2 business days of discharge.  2. Complexity of Medical Decision Making is moderate.  3. Face to face visit occurred within 7 days.    *Note: 13831 is for high complexity patients with a face to face visit within 7 days of discharge.  25600 is for high complexity patients with a face to face on days 8-14 post discharge or medium complexity with face to face visit within 14 days post discharge.      Problem List Items Addressed This Visit        Cardiac and Vasculature    Coronary artery  disease of native artery of native heart with stable angina pectoris    Overview     3/7/23: 2V cabg with free radial and lewis at [facility] dr. Palomino.  Left Heart Cath 5/2/23:  Conclusion        •  80% hazy OM 2 branch stenosis status post intervention with a Xience Skypoint 2.5 x 15 mm drug-eluting stent.  •  Patent LIMA to LAD.  Patent free radial to a diseased, small caliber diagonal branch with an 80% mid segment stenosis.  50% mid RCA stenosis with an RFR of 0.97.  •  LV pressures (S/D/E) 5 mmHg     Patient will continue atorvastatin,  diltiazem, metoprolol, aspirin and Plavix.    Discussed red flags and when to follow-up for those.         Current Assessment & Plan     Patient will continue atorvastatin,  diltiazem, metoprolol, aspirin and Plavix.          Hypertension    Current Assessment & Plan     Hypertension is improving with treatment.  Blood pressure will be reassessed in 4 weeks.  DASH diet  Patient to check blood pressure at home and record readings. Current bp in clinic is considered Goal <130/80.  Cardiac rehab.          Pericarditis    Overview      She was prescribed colchicine by the cardiologist. Advised the patient to start the medication.          Hyperlipidemia LDL goal <70    Overview     Atorvastatin 80 mg daily  Fenofibrate 145 mg daily            Endocrine and Metabolic    Type 2 diabetes mellitus with diabetic autonomic neuropathy, with long-term current use of insulin    Overview     Lab Results   Component Value Date    HGBA1C 9.60 (H) 05/03/2023     Hemoglobin A1c slightly improved on Basaglar 40 units twice a day and glipizide 10 mg twice a day.    5/11/23:Foot exam and urine microalbumin completed.          Current Assessment & Plan     Diabetes is improving with treatment.   Continue current treatment regimen.  Reminded to bring in blood sugar diary at next visit.  Dietary recommendations for ADA diet.  Discussed foot care.  Diabetes will be reassessed in 1 month.  Hemoglobin A1c  slightly improved on Basaglar 40 units twice a day and glipizide 10 mg twice a day.  Foot exam and urine microalbumin completed.   Discussed GLP1 and SGL2.     Patient does not have any history of pancreatitis, a personal history or family history of medullary thyroid cancer or personal or family history of multiple endocrine neoplasia syndrome type 2. Patient to monitor glucose daily and as needed. Discussed symptoms and  signs of hypoglycemia as well as treatment.  Patient to be compliant with medications.          Relevant Orders    POC Microalbumin (Completed)       Mental Health    Moderate episode of recurrent major depressive disorder    Overview     Paxil 40 mg daily            Pulmonary and Pneumonias    Right middle lobe pulmonary nodule    Overview     3/2/23: CT of chest  FINDINGS: There is an 8 mm nodule in the right middle lobe. There is a 5   mm subpleural nodule in the left lower lobe. Mild diffuse interstitial   changes are present. The ascending aorta is ectatic. There is no   pericardial or pleural effusion. There is no adenopathy. There is no   dissection. There is no PE. The spleen is mildly enlarged at 15 cm.  -Referral to lung nodule clinic            Current Assessment & Plan     She has an appointment with pulmonary 5/16/2023.         Dependence on continuous supplemental oxygen    Overview     Continue supplemental oxygen 2 L NC started in hospital 5/4/23 .            Other Visit Diagnoses     Acute cough    -  Primary    Relevant Orders    XR Chest PA & Lateral (Completed)    Palpitations        Relevant Orders    ECG 12 Lead    Holter Monitor - 72 Hour Up To 15 Days    Encounter for screening mammogram for malignant neoplasm of breast        Relevant Orders    Mammo Screening Bilateral With CAD          Transcribed from ambient dictation for MARK Estrada by Nurys Mckeon.  05/11/23   12:53 EDT    Patient or patient representative verbalized consent to the visit recording.  I  have personally performed the services described in this document as transcribed by the above individual, and it is both accurate and complete.

## 2023-05-11 NOTE — ASSESSMENT & PLAN NOTE
Hypertension is improving with treatment.  Blood pressure will be reassessed in 4 weeks.  DASH diet  Patient to check blood pressure at home and record readings. Current bp in clinic is considered Goal <130/80.  Cardiac rehab.

## 2023-05-12 ENCOUNTER — TELEPHONE (OUTPATIENT)
Dept: CARDIOLOGY | Facility: CLINIC | Age: 58
End: 2023-05-12
Payer: MEDICAID

## 2023-05-12 NOTE — TELEPHONE ENCOUNTER
Spoke with pt regarding lab results. She states she would like heart monitor mailed. KINGS Tan sent staff message to Daniel.

## 2023-05-12 NOTE — PROGRESS NOTES
Please inform the patient of their test results.  Labs look good, continue all current cardiac medications. Thank you.

## 2023-05-12 NOTE — TELEPHONE ENCOUNTER
----- Message from Saeed Temple MD sent at 5/12/2023  3:33 PM EDT -----  Please inform the patient of their test results.  Labs look good, continue all current cardiac medications. Thank you.

## 2023-05-14 PROBLEM — E78.5 HYPERLIPIDEMIA LDL GOAL <70: Status: ACTIVE | Noted: 2023-05-14

## 2023-05-14 PROBLEM — Z99.81 DEPENDENCE ON CONTINUOUS SUPPLEMENTAL OXYGEN: Status: ACTIVE | Noted: 2023-05-14

## 2023-05-14 PROBLEM — E11.43 TYPE 2 DIABETES MELLITUS WITH DIABETIC AUTONOMIC NEUROPATHY, WITH LONG-TERM CURRENT USE OF INSULIN: Status: ACTIVE | Noted: 2023-05-14

## 2023-05-14 PROBLEM — I31.9 PERICARDITIS: Status: ACTIVE | Noted: 2023-05-14

## 2023-05-14 PROBLEM — F33.1 MODERATE EPISODE OF RECURRENT MAJOR DEPRESSIVE DISORDER: Status: ACTIVE | Noted: 2023-05-14

## 2023-05-14 PROBLEM — Z79.4 TYPE 2 DIABETES MELLITUS WITH DIABETIC AUTONOMIC NEUROPATHY, WITH LONG-TERM CURRENT USE OF INSULIN: Status: ACTIVE | Noted: 2023-05-14

## 2023-05-14 NOTE — ASSESSMENT & PLAN NOTE
Diabetes is improving with treatment.   Continue current treatment regimen.  Reminded to bring in blood sugar diary at next visit.  Dietary recommendations for ADA diet.  Discussed foot care.  Diabetes will be reassessed in 1 month.  Hemoglobin A1c slightly improved on Basaglar 40 units twice a day and glipizide 10 mg twice a day.  Foot exam and urine microalbumin completed.   Discussed GLP1 and SGL2.

## 2023-05-15 ENCOUNTER — TELEPHONE (OUTPATIENT)
Dept: INTERNAL MEDICINE | Facility: CLINIC | Age: 58
End: 2023-05-15
Payer: MEDICAID

## 2023-05-15 NOTE — TELEPHONE ENCOUNTER
----- Message from MARK Estrada sent at 5/14/2023  2:41 PM EDT -----  Chest xray is within normal limits. Follow-up if no improvement in symptoms.    none

## 2023-05-15 NOTE — TELEPHONE ENCOUNTER
Tried calling patient, left voicemail to return call.     HUB OK TO READ: Chest xray is within normal limits. Follow-up if no improvement in symptoms.

## 2023-05-16 ENCOUNTER — APPOINTMENT (OUTPATIENT)
Dept: GENERAL RADIOLOGY | Facility: HOSPITAL | Age: 58
End: 2023-05-16
Payer: MEDICAID

## 2023-05-16 ENCOUNTER — APPOINTMENT (OUTPATIENT)
Dept: CT IMAGING | Facility: HOSPITAL | Age: 58
End: 2023-05-16
Payer: MEDICAID

## 2023-05-16 ENCOUNTER — READMISSION MANAGEMENT (OUTPATIENT)
Dept: CALL CENTER | Facility: HOSPITAL | Age: 58
End: 2023-05-16
Payer: MEDICAID

## 2023-05-16 ENCOUNTER — HOSPITAL ENCOUNTER (EMERGENCY)
Facility: HOSPITAL | Age: 58
Discharge: HOME | End: 2023-05-16
Attending: EMERGENCY MEDICINE | Admitting: EMERGENCY MEDICINE
Payer: MEDICAID

## 2023-05-16 ENCOUNTER — OFFICE VISIT (OUTPATIENT)
Dept: PULMONOLOGY | Facility: CLINIC | Age: 58
End: 2023-05-16
Payer: MEDICAID

## 2023-05-16 VITALS
SYSTOLIC BLOOD PRESSURE: 155 MMHG | DIASTOLIC BLOOD PRESSURE: 95 MMHG | RESPIRATION RATE: 12 BRPM | WEIGHT: 285 LBS | HEIGHT: 70 IN | BODY MASS INDEX: 40.8 KG/M2 | TEMPERATURE: 97.9 F | HEART RATE: 64 BPM | OXYGEN SATURATION: 97 %

## 2023-05-16 VITALS
HEART RATE: 76 BPM | WEIGHT: 284 LBS | HEIGHT: 70 IN | BODY MASS INDEX: 40.66 KG/M2 | DIASTOLIC BLOOD PRESSURE: 80 MMHG | OXYGEN SATURATION: 97 % | SYSTOLIC BLOOD PRESSURE: 122 MMHG | TEMPERATURE: 97.9 F

## 2023-05-16 DIAGNOSIS — R91.1 LUNG NODULE: Primary | ICD-10-CM

## 2023-05-16 DIAGNOSIS — R51.9 GENERALIZED HEADACHE: ICD-10-CM

## 2023-05-16 DIAGNOSIS — I25.118 CORONARY ARTERY DISEASE OF NATIVE ARTERY OF NATIVE HEART WITH STABLE ANGINA PECTORIS: ICD-10-CM

## 2023-05-16 DIAGNOSIS — J96.01 ACUTE RESPIRATORY FAILURE WITH HYPOXIA: ICD-10-CM

## 2023-05-16 DIAGNOSIS — M54.2 NECK PAIN ON LEFT SIDE: Primary | ICD-10-CM

## 2023-05-16 DIAGNOSIS — R06.02 SHORTNESS OF BREATH: ICD-10-CM

## 2023-05-16 LAB
ALBUMIN SERPL-MCNC: 4 G/DL (ref 3.5–5.2)
ALBUMIN/GLOB SERPL: 1.3 G/DL
ALP SERPL-CCNC: 90 U/L (ref 39–117)
ALT SERPL W P-5'-P-CCNC: 13 U/L (ref 1–33)
ANION GAP SERPL CALCULATED.3IONS-SCNC: 14 MMOL/L (ref 5–15)
AST SERPL-CCNC: 15 U/L (ref 1–32)
BASOPHILS # BLD AUTO: 0.04 10*3/MM3 (ref 0–0.2)
BASOPHILS NFR BLD AUTO: 0.4 % (ref 0–1.5)
BILIRUB SERPL-MCNC: 0.5 MG/DL (ref 0–1.2)
BUN BLDA-MCNC: 20 MG/DL
BUN SERPL-MCNC: 20 MG/DL (ref 6–20)
BUN/CREAT SERPL: 28.6 (ref 7–25)
CALCIUM SPEC-SCNC: 9.7 MG/DL (ref 8.6–10.5)
CHLORIDE BLDA-SCNC: 102 MMOL/L (ref 98–109)
CHLORIDE SERPL-SCNC: 103 MMOL/L (ref 98–107)
CO2 SERPL-SCNC: 24 MMOL/L (ref 22–29)
CREAT BLDA-MCNC: 0.6 MG/DL
CREAT SERPL-MCNC: 0.7 MG/DL (ref 0.57–1)
DEPRECATED RDW RBC AUTO: 41.9 FL (ref 37–54)
EGFRCR SERPLBLD CKD-EPI 2021: 102.3 ML/MIN/1.73
EOSINOPHIL # BLD AUTO: 0.28 10*3/MM3 (ref 0–0.4)
EOSINOPHIL NFR BLD AUTO: 2.6 % (ref 0.3–6.2)
ERYTHROCYTE [DISTWIDTH] IN BLOOD BY AUTOMATED COUNT: 14.1 % (ref 12.3–15.4)
GEN 5 2HR TROPONIN T REFLEX: 18 NG/L
GLOBULIN UR ELPH-MCNC: 3.2 GM/DL
GLUCOSE BLDC GLUCOMTR-MCNC: 157 MG/DL (ref 70–130)
GLUCOSE SERPL-MCNC: 161 MG/DL (ref 65–99)
HCT VFR BLD AUTO: 42.6 % (ref 34–46.6)
HGB BLD-MCNC: 13.6 G/DL (ref 12–15.9)
HGB BLDA-MCNC: 13.9 G/DL (ref 12–17)
HOLD SPECIMEN: NORMAL
IMM GRANULOCYTES # BLD AUTO: 0.03 10*3/MM3 (ref 0–0.05)
IMM GRANULOCYTES NFR BLD AUTO: 0.3 % (ref 0–0.5)
LIPASE SERPL-CCNC: 8 U/L (ref 13–60)
LYMPHOCYTES # BLD AUTO: 3.41 10*3/MM3 (ref 0.7–3.1)
LYMPHOCYTES NFR BLD AUTO: 32.2 % (ref 19.6–45.3)
MCH RBC QN AUTO: 25.9 PG (ref 26.6–33)
MCHC RBC AUTO-ENTMCNC: 31.9 G/DL (ref 31.5–35.7)
MCV RBC AUTO: 81.1 FL (ref 79–97)
MONOCYTES # BLD AUTO: 0.97 10*3/MM3 (ref 0.1–0.9)
MONOCYTES NFR BLD AUTO: 9.2 % (ref 5–12)
NEUTROPHILS NFR BLD AUTO: 5.87 10*3/MM3 (ref 1.7–7)
NEUTROPHILS NFR BLD AUTO: 55.3 % (ref 42.7–76)
NRBC BLD AUTO-RTO: 0 /100 WBC (ref 0–0.2)
NT-PROBNP SERPL-MCNC: 184.6 PG/ML (ref 0–900)
PLATELET # BLD AUTO: 460 10*3/MM3 (ref 140–450)
PMV BLD AUTO: 10.7 FL (ref 6–12)
POTASSIUM BLDA-SCNC: 3.9 MMOL/L (ref 3.5–4.9)
POTASSIUM SERPL-SCNC: 4.1 MMOL/L (ref 3.5–5.2)
PROT SERPL-MCNC: 7.2 G/DL (ref 6–8.5)
QT INTERVAL: 446 MS
QTC INTERVAL: 460 MS
RBC # BLD AUTO: 5.25 10*6/MM3 (ref 3.77–5.28)
SODIUM BLD-SCNC: 139 MMOL/L (ref 138–146)
SODIUM SERPL-SCNC: 141 MMOL/L (ref 136–145)
TROPONIN T DELTA: 3 NG/L
TROPONIN T SERPL HS-MCNC: 15 NG/L
WBC NRBC COR # BLD: 10.6 10*3/MM3 (ref 3.4–10.8)
WHOLE BLOOD HOLD COAG: NORMAL
WHOLE BLOOD HOLD SPECIMEN: NORMAL

## 2023-05-16 PROCEDURE — 70498 CT ANGIOGRAPHY NECK: CPT

## 2023-05-16 PROCEDURE — 84484 ASSAY OF TROPONIN QUANT: CPT | Performed by: EMERGENCY MEDICINE

## 2023-05-16 PROCEDURE — 83690 ASSAY OF LIPASE: CPT | Performed by: EMERGENCY MEDICINE

## 2023-05-16 PROCEDURE — 36415 COLL VENOUS BLD VENIPUNCTURE: CPT

## 2023-05-16 PROCEDURE — 83880 ASSAY OF NATRIURETIC PEPTIDE: CPT | Performed by: EMERGENCY MEDICINE

## 2023-05-16 PROCEDURE — 93005 ELECTROCARDIOGRAM TRACING: CPT | Performed by: EMERGENCY MEDICINE

## 2023-05-16 PROCEDURE — 85025 COMPLETE CBC W/AUTO DIFF WBC: CPT | Performed by: EMERGENCY MEDICINE

## 2023-05-16 PROCEDURE — 85014 HEMATOCRIT: CPT

## 2023-05-16 PROCEDURE — 25510000001 IOPAMIDOL PER 1 ML: Performed by: EMERGENCY MEDICINE

## 2023-05-16 PROCEDURE — 70496 CT ANGIOGRAPHY HEAD: CPT

## 2023-05-16 PROCEDURE — 80047 BASIC METABLC PNL IONIZED CA: CPT

## 2023-05-16 PROCEDURE — 71275 CT ANGIOGRAPHY CHEST: CPT

## 2023-05-16 PROCEDURE — 71045 X-RAY EXAM CHEST 1 VIEW: CPT

## 2023-05-16 PROCEDURE — 99284 EMERGENCY DEPT VISIT MOD MDM: CPT

## 2023-05-16 PROCEDURE — 80053 COMPREHEN METABOLIC PANEL: CPT | Performed by: EMERGENCY MEDICINE

## 2023-05-16 RX ORDER — SODIUM CHLORIDE 0.9 % (FLUSH) 0.9 %
10 SYRINGE (ML) INJECTION AS NEEDED
Status: DISCONTINUED | OUTPATIENT
Start: 2023-05-16 | End: 2023-05-17 | Stop reason: HOSPADM

## 2023-05-16 RX ORDER — ACETAMINOPHEN 500 MG
1000 TABLET ORAL ONCE
Status: COMPLETED | OUTPATIENT
Start: 2023-05-16 | End: 2023-05-16

## 2023-05-16 RX ORDER — ASPIRIN 81 MG/1
324 TABLET, CHEWABLE ORAL ONCE
Status: DISCONTINUED | OUTPATIENT
Start: 2023-05-16 | End: 2023-05-17 | Stop reason: HOSPADM

## 2023-05-16 RX ADMIN — ACETAMINOPHEN 1000 MG: 500 TABLET ORAL at 22:55

## 2023-05-16 RX ADMIN — IOPAMIDOL 150 ML: 755 INJECTION, SOLUTION INTRAVENOUS at 20:17

## 2023-05-16 NOTE — OUTREACH NOTE
AMI Week 2 Survey    Flowsheet Row Responses   [facility] [place], operated by [facility] patient discharged from? [place]   Does the patient have one of the following disease processes/diagnoses(primary or secondary)? Acute MI (STEMI,NSTEMI)   Week 2 attempt successful? Yes   Call start time [id number]   Call end time 1737   Discharge diagnosis NSTEMI, heart cath, shortness of breath, pleuritic chest pain   Person spoke with today (if not patient) and relationship patient   Meds reviewed with patient/caregiver? Yes   Is the patient having any side effects they believe may be caused by any medication additions or changes? No   Does the patient have all prescriptions related to this admission filled (includes statins,anticoagulants,HTN meds,anti-arrhythmia meds) Yes   Is the patient taking all medications as directed (includes completed medication regime)? Yes   Does the patient have a primary care provider?  Yes   Does the patient have an appointment with their PCP,cardiologist,or clinic within 7 days of discharge? Yes   Has the patient kept scheduled appointments due by today? Yes   Psychosocial issues? No   What is the patient's perception of their health status since discharge? Worsening   Is the patient/caregiver able to teach back signs and symptoms of when to call for help immediately: Sudden discomfort in arms, back, neck or jaw, Irregular or rapid heart rate   Nursing interventions Provided education on importance of cardiac rehab   Is the patient/caregiver able to teach back ways to prevent a second heart attack: Take medications, Follow up with MD   Is the patient/caregiver able to teach back the hierarchy of who to call/visit for symptoms/problems? PCP, Specialist, Home health nurse, Urgent Care, ED, 911 Yes   Week 2 call completed? Yes   Is the patient interested in additional calls from an ambulatory ?  NOTE:  applies to high risk patients requiring additional follow-up. No   Wrap up additional comments Pt reports having  continuous neck pain for the last hour that was the same pain as last admission when she had a MI. Pt advised to call 911, or have spouse take her to ER. Pt verbalized understanding.          Judi DUNN - Registered Nurse

## 2023-05-16 NOTE — ED PROVIDER NOTES
[place]    EMERGENCY DEPARTMENT ENCOUNTER      Pt Name: Estefani Blancas  MRN: [id number]  YOB: 1965  Date of evaluation: 5/16/2023  Provider: Jaleel Chamberlain DO    CHIEF COMPLAINT       Chief Complaint   Patient presents with   • Nausea   • Headache         HISTORY OF PRESENT ILLNESS  (Location/Symptom, Timing/Onset, Context/Setting, Quality, Duration, Modifying Factors, Severity.)   Estefani Blancas is a 57 y.o. female who presents to the emergency department via [facility] secondary to concern for generalized weakness, left-sided neck pain, headache which has been waxing and waning in severity over the last day or so.  She notes she was recently in the hospital and had a NSTEMI with a couple stents placed, follows a cardiologist Dr. Temple.  Since that procedure and discharge from the facility she has had increased fatigue.  She is planning on starting cardiac rehab this Thursday.  She was generalized nausea without vomiting.  No fall, injury or trauma.  She denies any unilateral weakness, numbness or tingling.  Denies any vision changes.  No history of underlying dissection or aneurysm.  She denies any chest pain, palpitations.  In the past that she has followed with [facility] cardiology and has had bypass procedure within the last couple years.  She now follows with [facility] cardiology.  She been compliant with her medications since discharge.  Denies any other acute systemic complaints at this time.      Nursing notes were reviewed.      PAST MEDICAL HISTORY     Past Medical History:   Diagnosis Date   • Abnormal ECG May 2 2023   • Asthma March 2 2023   • CHF (congestive heart failure) May 4 2023   • Congenital heart disease March 2 2023   • Coronary artery disease March 2 2023   • Diabetes mellitus    • Heart attack 03/02/2023   • Hyperlipidemia    • Hypertension    • Right middle lobe pulmonary nodule 05/02/2023    3/2/23: CT of chest FINDINGS: There is an 8 mm  nodule in the right middle lobe. There is a 5  mm subpleural nodule in the left lower lobe. Mild diffuse interstitial  changes are present. The ascending aorta is ectatic. There is no  pericardial or pleural effusion. There is no adenopathy. There is no  dissection. There is no PE. The spleen is mildly enlarged at 15 cm. -Referral to lung nodule clini   • Sleep apnea 2017         SURGICAL HISTORY       Past Surgical History:   Procedure Laterality Date   • ADENOIDECTOMY     • CARDIAC CATHETERIZATION N/A 2023    Procedure: Left Heart Cath;  Surgeon: Korey Rene MD;  Location[facility] CATH INVASIVE LOCATION;  Service: Cardiovascular;  Laterality: N/A;   • CARDIAC SURGERY  2023   •  SECTION     • CORONARY ARTERY BYPASS GRAFT  2023   • CORONARY STENT PLACEMENT  May 2 2023   • TONSILLECTOMY           CURRENT MEDICATIONS       Current Facility-Administered Medications:   •  acetaminophen (TYLENOL) tablet 1,000 mg, 1,000 mg, Oral, Once, Jaleel Chamberlain,   •  aspirin chewable tablet 324 mg, 324 mg, Oral, Once, Jaleel Chamberlain DO  •  sodium chloride 0.9 % flush 10 mL, 10 mL, Intravenous, PRN, Jaleel Chamberlain, DO    Current Outpatient Medications:   •  acetaminophen (TYLENOL) 650 MG 8 hr tablet, Take 1 tablet by mouth Every 8 (Eight) Hours As Needed., Disp: , Rfl:   •  aspirin 81 MG EC tablet, Take 1 tablet by mouth Daily., Disp: , Rfl:   •  atorvastatin (LIPITOR) 80 MG tablet, Take 1 tablet by mouth Daily., Disp: , Rfl:   •  clopidogrel (PLAVIX) 75 MG tablet, Take 1 tablet by mouth Daily., Disp: 30 tablet, Rfl: 11  •  colchicine 0.6 MG tablet, Take 1 tablet by mouth 2 (Two) Times a Day for 7 days, THEN 1 tablet Daily for 7 days., Disp: 21 tablet, Rfl: 0  •  dilTIAZem CD (CARDIZEM CD) 120 MG 24 hr capsule, Take 1 capsule by mouth Daily., Disp: , Rfl:   •  fenofibrate (TRICOR) 145 MG tablet, Take 1 tablet by mouth Daily., Disp: , Rfl:   •  furosemide (LASIX) 40 MG tablet,  "Take 1 tablet by mouth Daily., Disp: 30 tablet, Rfl: 2  •  glipizide (GLUCOTROL) 10 MG tablet, Take 1 tablet by mouth 2 (Two) Times a Day Before Meals., Disp: , Rfl:   •  Insulin Glargine (BASAGLAR KWIKPEN) 100 UNIT/ML injection pen, Inject 40 Units under the skin into the appropriate area as directed 2 (Two) Times a Day., Disp: , Rfl:   •  Insulin Pen Needle (Pen Needles) 31G X 8 MM misc, 1 each 2 (Two) Times a Day. For Diabetes: E11.43, Disp: 100 each, Rfl: 5  •  metoprolol tartrate (LOPRESSOR) 25 MG tablet, Take 1 tablet by mouth 2 (Two) Times a Day., Disp: , Rfl:   •  pantoprazole (PROTONIX) 40 MG EC tablet, Take 1 tablet by mouth Daily., Disp: , Rfl:   •  PARoxetine (PAXIL) 40 MG tablet, Take 1 tablet by mouth Every Morning., Disp: , Rfl:     ALLERGIES     Benazepril-hydrochlorothiazide and Latex    FAMILY HISTORY       Family History   Problem Relation Age of Onset   • Heart attack Mother    • Asthma Mother    • Heart attack Father    • Heart disease Father    • Hypertension Father    • Breast cancer Sister    • Asthma Sister    • Lung cancer Paternal Aunt    • Heart attack Maternal Grandmother    • Heart attack Paternal Grandmother    • Heart attack Paternal Grandfather           SOCIAL HISTORY       Social History     Socioeconomic History   • Marital status:    Tobacco Use   • Smoking status: Never   • Smokeless tobacco: Never   Vaping Use   • Vaping Use: Never used   Substance and Sexual Activity   • Alcohol use: Never   • Drug use: Never   • Sexual activity: Not Currently     Partners: Male     Birth control/protection: Post-menopausal         PHYSICAL EXAM    (up to 7 for level 4, 8 or more for level 5)     Vitals:    05/16/23 1923 05/16/23 1927   BP: 155/95    BP Location: Right arm    Patient Position: Lying    Pulse: 64    Resp: 12    Temp:  97.9 °F (36.6 °C)   TempSrc:  Oral   SpO2: 97%    Weight: 129 kg (285 lb)    Height: 177.8 cm (70\")        Physical Exam  General : Patient is awake, " alert, oriented, in no acute distress, nontoxic appearing  HEENT: Pupils are equally round, EOMI, conjunctivae clear, there is no injection no icterus.  Oral mucosa is moist, uvula midline  Neck: Neck is supple, full range of motion, trachea midline  Cardiac: Heart regular rate, rhythm, no murmurs, rubs, or gallops.  No carotid bruit.  Lungs: Lungs are clear to auscultation, there is no wheezing, rhonchi, or rales. There is no use of accessory muscles  Chest wall: There is no tenderness to palpation over the chest wall or over ribs  Abdomen: Abdomen is soft, nontender, nondistended. There are no firm or pulsatile masses, no rebound rigidity or guarding  Musculoskeletal: Trace edema bilateral lower extremities, 5 out of 5 strength in all 4 extremities.  No focal muscle deficits are appreciated  Neuro: GCS 15, no focal neurological deficit on examination.  Motor intact, sensory intact, level of consciousness is normal, cerebellar function is normal  Dermatology: Skin is warm and dry  Psych: Mentation is grossly normal, cognition is grossly normal. Affect is appropriate      DIAGNOSTIC RESULTS     EKG:  All EKGs are interpreted by the Emergency Department Physician who either signs or Co-signs this chart in the absence of a cardiologist.    ECG 12 Lead ED Triage Standing Order; Chest Pain   Preliminary Result   Test Reason : ED Triage Standing Order~   Blood Pressure :   */*   mmHG   Vent. Rate :  61 BPM     Atrial Rate :  61 BPM      P-R Int : 192 ms          QRS Dur :  72 ms       QT Int : 408 ms       P-R-T Axes :   0  35 119 degrees      QTc Int : 410 ms      ** Age and gender specific ECG analysis **   Normal sinus rhythm   Anteroseptal infarct (cited on or before 03-MAY-2023)   Inferior injury pattern   ** ** ACUTE MI / STEMI ** **   Consider right ventricular involvement in acute inferior infarct   Abnormal ECG   When compared with ECG of 16-MAY-2023 19:24,   Nonspecific T wave abnormality, worse in  Anterolateral leads      Referred By: MD ED           Confirmed By:       ECG 12 Lead ED Triage Standing Order; Chest Pain   Final Result   Test Reason : ED Triage Standing Order~   Blood Pressure :   */*   mmHG   Vent. Rate :  64 BPM     Atrial Rate :  64 BPM      P-R Int : 170 ms          QRS Dur :  82 ms       QT Int : 446 ms       P-R-T Axes :  34  35  79 degrees      QTc Int : 460 ms      Normal sinus rhythm   Possible Anterior infarct (cited on or before 03-MAY-2023)   Abnormal ECG   When compared with ECG of 03-MAY-2023 09:37,   No significant change was found   Confirmed by FREDRICK HERNANDEZ MD ([id number]) on 5/16/2023 7:31:48 PM      Referred By: EDMD           Confirmed By: FREDRICK HERNANDEZ MD          RADIOLOGY:     [] Radiologist's Report Reviewed:  CT Angiogram Neck   Final Result   Impression:   Major arterial vasculature within head and neck appears widely patent, with no hemodynamically significant stenosis, dissection, thrombus, or aneurysm.            Electronically Signed: Misbah Barbour     5/16/2023 8:34 PM EDT     Workstation ID: [id number]      CT Angiogram Chest Pulmonary Embolism   Final Result   Impression:   No evidence of pulmonary embolism.      No acute intrathoracic findings.      Chronic and incidental ancillary findings as noted above.            Electronically Signed: Mahin Calle     5/16/2023 8:37 PM EDT     Workstation ID: [id number]      CT Angiogram Head   Final Result   Impression:   Major arterial vasculature within head and neck appears widely patent, with no hemodynamically significant stenosis, dissection, thrombus, or aneurysm.            Electronically Signed: Misbah Barbour     5/16/2023 8:34 PM EDT     Workstation ID: [id number]      XR Chest 1 View   Final Result   Impression:   No acute cardiopulmonary findings.         Electronically Signed: Mahin Calle     5/16/2023 8:08 PM EDT     Workstation ID: [id number]          I ordered and independently reviewed the above noted  radiographic studies.      I viewed images of chest x-ray which showed no acute cardiopulmonary process per my independent interpretation.    See radiologist's dictation for official interpretation.      ED BEDSIDE ULTRASOUND:   Performed by ED Physician - none    LABS:    I have reviewed and interpreted all of the currently available lab results from this visit (if applicable):  Results for orders placed or performed during the hospital encounter of 05/16/23   High Sensitivity Troponin T    Specimen: Blood   Result Value Ref Range    HS Troponin T 15 (H) <10 ng/L   Comprehensive Metabolic Panel    Specimen: Blood   Result Value Ref Range    Glucose 161 (H) 65 - 99 mg/dL    BUN 20 6 - 20 mg/dL    Creatinine 0.70 0.57 - 1.00 mg/dL    Sodium 141 136 - 145 mmol/L    Potassium 4.1 3.5 - 5.2 mmol/L    Chloride 103 98 - 107 mmol/L    CO2 24.0 22.0 - 29.0 mmol/L    Calcium 9.7 8.6 - 10.5 mg/dL    Total Protein 7.2 6.0 - 8.5 g/dL    Albumin 4.0 3.5 - 5.2 g/dL    ALT (SGPT) 13 1 - 33 U/L    AST (SGOT) 15 1 - 32 U/L    Alkaline Phosphatase 90 39 - 117 U/L    Total Bilirubin 0.5 0.0 - 1.2 mg/dL    Globulin 3.2 gm/dL    A/G Ratio 1.3 g/dL    BUN/Creatinine Ratio 28.6 (H) 7.0 - 25.0    Anion Gap 14.0 5.0 - 15.0 mmol/L    eGFR 102.3 >60.0 mL/min/1.73   Lipase    Specimen: Blood   Result Value Ref Range    Lipase 8 (L) 13 - 60 U/L   BNP    Specimen: Blood   Result Value Ref Range    proBNP 184.6 0.0 - 900.0 pg/mL   CBC Auto Differential    Specimen: Blood   Result Value Ref Range    WBC 10.60 3.40 - 10.80 10*3/mm3    RBC 5.25 3.77 - 5.28 10*6/mm3    Hemoglobin 13.6 12.0 - 15.9 g/dL    Hematocrit 42.6 34.0 - 46.6 %    MCV 81.1 79.0 - 97.0 fL    MCH 25.9 (L) 26.6 - 33.0 pg    MCHC 31.9 31.5 - 35.7 g/dL    RDW 14.1 12.3 - 15.4 %    RDW-SD 41.9 37.0 - 54.0 fl    MPV 10.7 6.0 - 12.0 fL    Platelets 460 (H) 140 - 450 10*3/mm3    Neutrophil % 55.3 42.7 - 76.0 %    Lymphocyte % 32.2 19.6 - 45.3 %    Monocyte % 9.2 5.0 - 12.0 %     Eosinophil % 2.6 0.3 - 6.2 %    Basophil % 0.4 0.0 - 1.5 %    Immature Grans % 0.3 0.0 - 0.5 %    Neutrophils, Absolute 5.87 1.70 - 7.00 10*3/mm3    Lymphocytes, Absolute 3.41 (H) 0.70 - 3.10 10*3/mm3    Monocytes, Absolute 0.97 (H) 0.10 - 0.90 10*3/mm3    Eosinophils, Absolute 0.28 0.00 - 0.40 10*3/mm3    Basophils, Absolute 0.04 0.00 - 0.20 10*3/mm3    Immature Grans, Absolute 0.03 0.00 - 0.05 10*3/mm3    nRBC 0.0 0.0 - 0.2 /100 WBC   High Sensitivity Troponin T 2Hr    Specimen: Blood   Result Value Ref Range    HS Troponin T 18 (H) <10 ng/L    Troponin T Delta 3 >=-4 - <+4 ng/L   POC Chem 8    Specimen: Blood   Result Value Ref Range    Sodium 139 138 - 146 mmol/L    POC Potassium 3.9 3.5 - 4.9 mmol/L    Chloride 102 98 - 109 mmol/L    Glucose 157 (A) 70 - 130 mg/dL    BUN 20 mg/dL    Creatinine 0.60 mg/dL    Hemoglobin 13.9 12.0 - 17.0 g/dL   ECG 12 Lead ED Triage Standing Order; Chest Pain   Result Value Ref Range    QT Interval 446 ms    QTC Interval 460 ms   ECG 12 Lead ED Triage Standing Order; Chest Pain   Result Value Ref Range    QT Interval 408 ms    QTC Interval 410 ms   Green Top (Gel)   Result Value Ref Range    Extra Tube Hold for add-ons.    Lavender Top   Result Value Ref Range    Extra Tube hold for add-on    Gold Top - SST   Result Value Ref Range    Extra Tube Hold for add-ons.    Light Blue Top   Result Value Ref Range    Extra Tube Hold for add-ons.         If labs were ordered, I independently reviewed the results and considered them in treating the patient.      EMERGENCY DEPARTMENT COURSE and DIFFERENTIAL DIAGNOSIS/MDM:   Vitals:  AS OF 22:45 EDT    BP - 155/95  HR - 64  TEMP - 97.9 °F (36.6 °C) (Oral)  O2 SATS - 97%      Orders placed during this visit:  Orders Placed This Encounter   Procedures   • XR Chest 1 View   • CT Angiogram Neck   • CT Angiogram Chest Pulmonary Embolism   • CT Angiogram Head   • [place] Draw   • High Sensitivity Troponin T   • Comprehensive Metabolic Panel   •  Lipase   • BNP   • CBC Auto Differential   • High Sensitivity Troponin T 2Hr   • NPO Diet NPO Type: Strict NPO   • Undress & Gown   • Continuous Pulse Oximetry   • Oxygen Therapy- Nasal Cannula; Titrate 1-6 LPM Per SpO2; 90 - 95%   • POC Chem 8   • ECG 12 Lead ED Triage Standing Order; Chest Pain   • ECG 12 Lead ED Triage Standing Order; Chest Pain   • Insert Peripheral IV   • CBC & Differential   • Green Top (Gel)   • Lavender Top   • Gold Top - SST   • Gray Top   • Light Blue Top       All labs have been independently reviewed by me.  All radiology studies have been reviewed by me and the radiologist dictating the report.  All EKG's have been independently viewed and interpreted by me.      Discussion below represents my analysis of pertinent findings related to patient's condition, differential diagnosis, treatment plan and final disposition.    Differential diagnosis:  The differential diagnosis associated with the patient's presentation includes: NSTEMI, vertebral dissection, aneurysm    Additional sources  Discussed/ obtained information from independent historians:   [] Spouse  [] Parent  [] Family member  [] Friend  [x] EMS   [] Other:    External (non-ED) record review:   [x] Inpatient record:   [] Office record:   [] Outpatient record:   [] Prior Outpatient labs:   [] Prior Outpatient radiology:   [] Primary Care record:   [] Outside ED record:   [] Other:     Patient's care impacted by:   [] Diabetes  [] Hypertension  [] Hyperlipidemia  [x] Coronary Artery Disease   [] COPD   [] Cancer   [] Tobacco Abuse   [] Substance Abuse    [] Other:     Care significantly affected by Social Determinants of Health (housing and economic circumstances, unemployment)    [] Yes     [x] No   If yes, Patient's care significantly limited by Social Determinants of Health including:   [] Inadequate housing   [] Low income   [] Alcoholism and drug addiction in family   [] Problems related to primary support group   []  Unemployment   [] Problems related to employment   [] Other Social Determinants of Health:       MEDICATIONS ADMINISTERED IN ED:  Medications   sodium chloride 0.9 % flush 10 mL (has no administration in time range)   aspirin chewable tablet 324 mg (324 mg Oral Not Given 5/16/23 1931)   acetaminophen (TYLENOL) tablet 1,000 mg (has no administration in time range)   iopamidol (ISOVUE-370) 76 % injection 150 mL (150 mL Intravenous Given 5/16/23 2017)              Patient presents for left-sided neck pain, generalized headache, just fatigue and weakness that she has been recently in the hospital and treated for NSTEMI undergoing cardiac catheterization and stent placements.  She does not have any focal neurological deficit on initial examination, she is awake and alert, vital signs are stable.  Initial EKG without acute ischemic changes.  Blood work labs and imaging obtained with results as above.  No acute or significant normality on CT scan imaging, no aneurysm, dissection, no pulmonary embolism or signs of acute intrathoracic pathology.  On reevaluation patient resting comfortably, complaining of just a generalized headache, no chest pain.  No signs of acute infectious etiology.  She is happy.  There is results, she feels comfortable with going home with close follow-up with her PCP in addition to calling her cardiology team for reevaluation's upcoming week.  Return precautions discussed with the patient.  She will continue her medications as previously prescribed.    I had a discussion with the patient/family regarding diagnosis, diagnostic results, treatment plan, and medications.  The patient/family indicated understanding of these instructions.  I spent adequate time at the bedside preceding discharge necessary to personally discuss the aftercare instructions, giving patient education, providing explanations of the results of our evaluations/findings, and my decision making to assure that the patient/family  understand the plan of care.  Time was allotted to answer questions at that time and throughout the ED course.  Emphasis was placed on timely follow-up after discharge.  I also discussed the potential for the development of an acute emergent condition requiring further evaluation, admission, or even surgical intervention. I discussed that we found nothing during the visit today indicating the need for further workup, admission, or the presence of an unstable medical condition.  I encouraged the patient to return to the emergency department immediately for ANY concerns, worsening, new complaints, or if symptoms persist and unable to seek follow-up in a timely fashion.  The patient/family expressed understanding and agreement with this plan.  The patient will follow-up with their PCP in 1-2 days for reevaluation.     PROCEDURES:  Procedures    CRITICAL CARE TIME    Total Critical Care time was 0 minutes, excluding separately reportable procedures.   There was a high probability of clinically significant/life threatening deterioration in the patient's condition which required my urgent intervention.      FINAL IMPRESSION      1. Neck pain on left side    2. Generalized headache          DISPOSITION/PLAN     ED Disposition     ED Disposition   Discharge    Condition   Stable    Comment   --             PATIENT REFERRED TO:  Saeed Temple MD  [address]  [place] [address]  [phone]    Schedule an appointment as soon as possible for a visit   Cardiologist    Leeanna Tovar APRN  [address]  [address]  [place] [id number] [phone]    In 2 days      [facility] Emergency Department  [address] [address] [phone]    If symptoms worsen      DISCHARGE MEDICATIONS:     Medication List      CONTINUE taking these medications    acetaminophen 650 MG 8 hr tablet  Commonly known as: TYLENOL     aspirin 81 MG EC tablet     atorvastatin  80 MG tablet  Commonly known as: LIPITOR     BASAGLAR KWIKPEN 100 UNIT/ML injection pen     clopidogrel 75 MG tablet  Commonly known as: PLAVIX  Take 1 tablet by mouth Daily.     colchicine 0.6 MG tablet  Take 1 tablet by mouth 2 (Two) Times a Day for 7 days, THEN 1 tablet Daily for 7 days.  Start taking on: May 4, 2023     dilTIAZem  MG 24 hr capsule  Commonly known as: CARDIZEM CD     fenofibrate 145 MG tablet  Commonly known as: TRICOR     furosemide 40 MG tablet  Commonly known as: LASIX  Take 1 tablet by mouth Daily.     glipizide 10 MG tablet  Commonly known as: GLUCOTROL     metoprolol tartrate 25 MG tablet  Commonly known as: LOPRESSOR     pantoprazole 40 MG EC tablet  Commonly known as: PROTONIX     PARoxetine 40 MG tablet  Commonly known as: PAXIL     Pen Needles 31G X 8 MM misc  1 each 2 (Two) Times a Day. For Diabetes: E11.43                Comment: Please note this report has been produced using speech recognition software.      Jaleel Chamberlain DO  Attending Emergency Physician       Jaleel Chamberlain DO  05/16/23 [id number]

## 2023-05-16 NOTE — PROGRESS NOTES
New Patient Pulmonary Office Visit      Patient Name: Estefani Blancas    Referring Physician: Leeanna Tovar APRN    Chief Complaint:    Chief Complaint   Patient presents with   • Lung Nodule       History of Present Illness: Estefani Blancas is a 57 y.o. female who is here today to establish care with Pulmonary.  Patient has a past medical history significant for heart failure, coronary artery disease, diabetes mellitus type 2, hyperlipidemia, hypertension, and sleep apnea.  He was referred to pulmonary for evaluation of a pulmonary nodule.  Patient was found to have a nodule when she was hospitalized at [facility] in March 2023, was found to be 8 mm in size.  She ended up having hospitalization at [facility] for coronary artery disease status post PCI in May 2023 where the 8 mm nodule was again noted with a significant mount of atelectasis.  She denies any chest pain, nausea, fever, or chills.  She is still short of breath but gradually better.  She was discharged with oxygen after her most recent NSTEMI and stent placement.  But is now 97% on room air.    Review of Systems:   Review of Systems   Constitutional: Negative for activity change, appetite change, chills and diaphoresis.   HENT: Negative for congestion, postnasal drip, sinus pressure and voice change.    Eyes: Negative for blurred vision.   Respiratory: Positive for shortness of breath. Negative for cough and wheezing.    Cardiovascular: Negative for chest pain.   Gastrointestinal: Negative for abdominal pain.   Musculoskeletal: Negative for myalgias.   Skin: Negative for color change and dry skin.   Allergic/Immunologic: Negative for environmental allergies.   Neurological: Negative for weakness and confusion.   Hematological: Negative for adenopathy.   Psychiatric/Behavioral: Negative for sleep disturbance and depressed mood.       Past Medical History:   Past Medical History:   Diagnosis Date   • Abnormal ECG May 2 2023   •  Asthma 2023   • CHF (congestive heart failure) May 4 2023   • Congenital heart disease 2023   • Coronary artery disease 2023   • Diabetes mellitus    • Heart attack 2023   • Hyperlipidemia    • Hypertension    • Right middle lobe pulmonary nodule 2023    3/2/23: CT of chest FINDINGS: There is an 8 mm nodule in the right middle lobe. There is a 5  mm subpleural nodule in the left lower lobe. Mild diffuse interstitial  changes are present. The ascending aorta is ectatic. There is no  pericardial or pleural effusion. There is no adenopathy. There is no  dissection. There is no PE. The spleen is mildly enlarged at 15 cm. -Referral to lung nodule clini   • Sleep apnea 2017       Past Surgical History:   Past Surgical History:   Procedure Laterality Date   • ADENOIDECTOMY     • CARDIAC CATHETERIZATION N/A 2023    Procedure: Left Heart Cath;  Surgeon: Korey Rene MD;  [facility];  Service: Cardiovascular;  Laterality: N/A;   • CARDIAC SURGERY  2023   •  SECTION     • CORONARY ARTERY BYPASS GRAFT  2023   • CORONARY STENT PLACEMENT  May 2 2023   • TONSILLECTOMY         Family History:   Family History   Problem Relation Age of Onset   • Heart attack Mother    • Asthma Mother    • Heart attack Father    • Heart disease Father    • Hypertension Father    • Breast cancer Sister    • Asthma Sister    • Lung cancer Paternal Aunt    • Heart attack Maternal Grandmother    • Heart attack Paternal Grandmother    • Heart attack Paternal Grandfather        Social History:   Social History     Socioeconomic History   • Marital status:    Tobacco Use   • Smoking status: Never   • Smokeless tobacco: Never   Vaping Use   • Vaping Use: Never used   Substance and Sexual Activity   • Alcohol use: Never   • Drug use: Never   • Sexual activity: Not Currently     Partners: Male     Birth control/protection: Post-menopausal  "      Medications:     Current Outpatient Medications:   •  acetaminophen (TYLENOL) 650 MG 8 hr tablet, Take 1 tablet by mouth Every 8 (Eight) Hours As Needed., Disp: , Rfl:   •  aspirin 81 MG EC tablet, Take 1 tablet by mouth Daily., Disp: , Rfl:   •  atorvastatin (LIPITOR) 80 MG tablet, Take 1 tablet by mouth Daily., Disp: , Rfl:   •  clopidogrel (PLAVIX) 75 MG tablet, Take 1 tablet by mouth Daily., Disp: 30 tablet, Rfl: 11  •  colchicine 0.6 MG tablet, Take 1 tablet by mouth 2 (Two) Times a Day for 7 days, THEN 1 tablet Daily for 7 days., Disp: 21 tablet, Rfl: 0  •  dilTIAZem CD (CARDIZEM CD) 120 MG 24 hr capsule, Take 1 capsule by mouth Daily., Disp: , Rfl:   •  fenofibrate (TRICOR) 145 MG tablet, Take 1 tablet by mouth Daily., Disp: , Rfl:   •  furosemide (LASIX) 40 MG tablet, Take 1 tablet by mouth Daily., Disp: 30 tablet, Rfl: 2  •  glipizide (GLUCOTROL) 10 MG tablet, Take 1 tablet by mouth 2 (Two) Times a Day Before Meals., Disp: , Rfl:   •  Insulin Glargine (BASAGLAR KWIKPEN) 100 UNIT/ML injection pen, Inject 40 Units under the skin into the appropriate area as directed 2 (Two) Times a Day., Disp: , Rfl:   •  Insulin Pen Needle (Pen Needles) 31G X 8 MM misc, 1 each 2 (Two) Times a Day. For Diabetes: E11.43, Disp: 100 each, Rfl: 5  •  metoprolol tartrate (LOPRESSOR) 25 MG tablet, Take 1 tablet by mouth 2 (Two) Times a Day., Disp: , Rfl:   •  pantoprazole (PROTONIX) 40 MG EC tablet, Take 1 tablet by mouth Daily., Disp: , Rfl:   •  PARoxetine (PAXIL) 40 MG tablet, Take 1 tablet by mouth Every Morning., Disp: , Rfl:     Allergies:   Allergies   Allergen Reactions   • Benazepril-Hydrochlorothiazide Cough     Other reaction(s): Cough   • Latex Hives       Physical Exam:  Vital Signs:   Vitals:    05/16/23 1104   BP: 122/80   Pulse: 76   Temp: 97.9 °F (36.6 °C)   SpO2: 97%  Comment: resting, room air   Weight: 129 kg (284 lb)   Height: 177.8 cm (70\")       Physical Exam  Vitals and nursing note reviewed. "   Constitutional:       General: She is not in acute distress.     Appearance: She is well-developed. She is obese. She is not ill-appearing or toxic-appearing.   HENT:      Head: Normocephalic and atraumatic.   Cardiovascular:      Rate and Rhythm: Normal rate and regular rhythm.      Pulses: Normal pulses.      Heart sounds: Normal heart sounds. No murmur heard.    No friction rub. No gallop.   Pulmonary:      Effort: Pulmonary effort is normal. No respiratory distress.      Breath sounds: Normal breath sounds. No wheezing, rhonchi or rales.   Musculoskeletal:      Right lower leg: No edema.      Left lower leg: No edema.   Skin:     General: Skin is warm and dry.   Neurological:      Mental Status: She is alert and oriented to person, place, and time.         Immunization History   Administered Date(s) Administered   • Hepatitis A 12/13/2018   • Pneumococcal Polysaccharide (PPSV23) 08/11/2017   • Tdap 08/11/2017       Results Review:   - I personally reviewed the pts imaging from CT scan of the chest from 5/3/2023 which showed a small right-sided pleural effusion, also associated atelectasis, and a 8 mm right middle lobe noncalcified nodule.  There is also significant chronic atelectatic changes in the lingula and left lower lobe.  - I personally reviewed the pts Echo report from 5/3/2023 showed a EF of 57%, RVSP less than 35 mmHg, with a small pericardial effusion, diastolic function was normal.  - I personally reviewed the pts lower extremity duplex on 5/3/2023 showed normal right lower extremity duplex.  -I personally viewed the patient's left heart cath results from 5/2/2023 which showed 80% stenosis of the OM 2, status post drug-eluting stent, patent LIMA to LAD with 50% RCA stenosis.    Assessment / Plan:   Diagnoses and all orders for this visit:    1. 8 mm right middle lobe nodule and 4 mm left lower lobe nodule noncalcified (3/2023) (Primary)  -With regards to the right middle lobe nodule, this was  initially mentioned from the CT scanner at [facility] where she was found to have an 8 mm right middle lobe nodule and a 4 mm left lower lobe pleural-based nodule.  I cannot appreciate the left lower lobe nodule at this point in time.  But the right middle lobe nodule is still present.  It is unchanged therefore we will plan for repeat CT scan in 6 months.    2. Shortness of breath  3. Coronary artery disease of native artery of native heart with stable angina pectoris  4. Acute respiratory failure with hypoxia  -With regard to the patients shortness of breath.  Has been ongoing for a while now especially she had COVID in addition to that though she has had now an MI with a CABG and having to have recent stent placement.  I think there is a very high chance of shortness of breath is related to her cardiac disease.  I would like her to continue with treatment and management of that first and then eventually we can work-up from all pulmonary related causes.  She did have some atelectasis at the time of her most recent CT scan, which could contribute to the hypoxia.  She is now 97% on room air.  I told the patient to go ahead and start checking her self on a consistent basis to see if she is maintaining above 90%.  If she is not she can discontinue the oxygen.  She verbalized understanding and agree with the plan.  -PFTs and 6-minute walk with the next visit.       Follow Up:   Return in about 6 months (around 11/16/2023) for CT Chest with next visit.     DOTTY Lopez, DO  Pulmonary and Critical Care Medicine  Note Electronically Signed    Part of this note may be an electronic transcription/translation of spoken language to printed text using the Dragon Dictation System.

## 2023-05-18 ENCOUNTER — TREATMENT (OUTPATIENT)
Dept: CARDIAC REHAB | Facility: HOSPITAL | Age: 58
End: 2023-05-18
Payer: MEDICAID

## 2023-05-18 DIAGNOSIS — Z95.5 STENTED CORONARY ARTERY: ICD-10-CM

## 2023-05-18 LAB
BUN BLDA-MCNC: 20 MG/DL (ref 8–26)
CA-I BLDA-SCNC: 1.17 MMOL/L (ref 1.2–1.32)
CHLORIDE BLDA-SCNC: 102 MMOL/L (ref 98–109)
CO2 BLDA-SCNC: 26 MMOL/L (ref 24–29)
CREAT BLDA-MCNC: 0.6 MG/DL (ref 0.6–1.3)
EGFRCR SERPLBLD CKD-EPI 2021: 106.2 ML/MIN/1.73
GLUCOSE BLDC GLUCOMTR-MCNC: 157 MG/DL (ref 70–130)
HCT VFR BLDA CALC: 41 % (ref 38–51)
HGB BLDA-MCNC: 13.9 G/DL (ref 12–17)
POTASSIUM BLDA-SCNC: 3.9 MMOL/L (ref 3.5–4.9)
SODIUM BLD-SCNC: 139 MMOL/L (ref 138–146)

## 2023-05-18 PROCEDURE — 93798 PHYS/QHP OP CAR RHAB W/ECG: CPT

## 2023-05-18 NOTE — PROGRESS NOTES
Attended Phase II Cardiac Rehab Orientation. See [facility] for details.

## 2023-05-22 ENCOUNTER — TELEPHONE (OUTPATIENT)
Dept: CARDIAC REHAB | Facility: HOSPITAL | Age: 58
End: 2023-05-22
Payer: MEDICAID

## 2023-05-22 ENCOUNTER — APPOINTMENT (OUTPATIENT)
Dept: CARDIAC REHAB | Facility: HOSPITAL | Age: 58
End: 2023-05-22
Payer: MEDICAID

## 2023-05-22 NOTE — TELEPHONE ENCOUNTER
Patient called to cancel her Cardiac Rehab Exercise session today and for the rest of the week stating that she is out of gas. Patient plans to return on Wednesday, May 31st.

## 2023-05-23 ENCOUNTER — READMISSION MANAGEMENT (OUTPATIENT)
Dept: CALL CENTER | Facility: HOSPITAL | Age: 58
End: 2023-05-23
Payer: MEDICAID

## 2023-05-23 NOTE — OUTREACH NOTE
AMI Week 3 Survey    Flowsheet Row Responses   [facility] facility patient discharged from? [place]   Does the patient have one of the following disease processes/diagnoses(primary or secondary)? Acute MI (STEMI,NSTEMI)   Week 3 attempt successful? No   Unsuccessful attempts Attempt 1  [All numbers listed were attempted-no answer]          Clementine H - Registered Nurse

## 2023-05-31 ENCOUNTER — APPOINTMENT (OUTPATIENT)
Dept: CARDIAC REHAB | Facility: HOSPITAL | Age: 58
End: 2023-05-31
Payer: MEDICAID

## 2023-06-14 NOTE — PROGRESS NOTES
OFFICE VISIT  NOTE  [facility] CARDIOLOGY MAIN CAMPUS      Name: Estefani Blancas    Date: 6/15/2023  MRN:  [id number]  :  1965      REFERRING/PRIMARY PROVIDER:  Leeanna Tovar APRN     Chief Complaint   Patient presents with   • Coronary Artery Disease     HPI: Estefani Blancas is a 57 y.o. female who presents today for hospital follow up from -23 for NSTEMI and post-pericardiotomy syndrome. Associated history of HTN, HLD, diabetes and obesity. She has recently undergone CABG x2 at [facility] in 2023. Presented with worsening chest pain last month, EKG on arrival concerning for subtle ESTEVAN in inferior leads, and she was taken for urgent cath. This showed 80% hazy OM2 stenosis, s/p ERNESTO, patent LIMA to LAD and patent free radial to diseased small diagonal branch. 50% mid RCA with normal RFR. Echo showed normal EF, small <1cm pericardial effusion. Post-cath she complained of pleuritic chest pain and worsening dyspnea. She was treated with Lasix and Colchicine with improvement and discharged home with colchicine taper in place for 2 weeks. She reports the Colchicine resolved her pain when she was on it, however her pain has returned since this past Saturday. She has pleuritic chest pain, cannot take a deep breath, has pain when laying down, and left neck pain. She has associated nausea and diaphoresis with her pain, and has had chills and low grade fever of 99. She denies any angina or unusual dyspnea with exertion.     ROS:Pertinent positives as listed in the HPI.  All other systems reviewed and negative.    Past Medical History:   Diagnosis Date   • Abnormal ECG May 2 2023   • Asthma 2023   • CHF (congestive heart failure) May 4 2023   • Congenital heart disease 2023   • Coronary artery disease 2023   • Diabetes mellitus    • Heart attack 2023   • Hyperlipidemia    • Hypertension    • Right middle lobe pulmonary nodule 2023    3/2/23: CT of  chest FINDINGS: There is an 8 mm nodule in the right middle lobe. There is a 5  mm subpleural nodule in the left lower lobe. Mild diffuse interstitial  changes are present. The ascending aorta is ectatic. There is no  pericardial or pleural effusion. There is no adenopathy. There is no  dissection. There is no PE. The spleen is mildly enlarged at 15 cm. -Referral to lung nodule clini   • Sleep apnea 2017       Past Surgical History:   Procedure Laterality Date   • ADENOIDECTOMY     • CARDIAC CATHETERIZATION N/A 2023    Procedure: Left Heart Cath;  Surgeon: Korey Rene MD;  Location: [facility] CATH INVASIVE LOCATION;  Service: Cardiovascular;  Laterality: N/A;   • CARDIAC SURGERY  2023   •  SECTION     • CORONARY ARTERY BYPASS GRAFT  2023   • CORONARY STENT PLACEMENT  May 2 2023   • TONSILLECTOMY         Social History     Socioeconomic History   • Marital status:    Tobacco Use   • Smoking status: Never   • Smokeless tobacco: Never   Vaping Use   • Vaping Use: Never used   Substance and Sexual Activity   • Alcohol use: Never   • Drug use: Never   • Sexual activity: Not Currently     Partners: Male     Birth control/protection: Post-menopausal       Family History   Problem Relation Age of Onset   • Heart attack Mother    • Asthma Mother    • Heart attack Father    • Heart disease Father    • Hypertension Father    • Breast cancer Sister    • Asthma Sister    • Lung cancer Paternal Aunt    • Heart attack Maternal Grandmother    • Heart attack Paternal Grandmother    • Heart attack Paternal Grandfather         Allergies   Allergen Reactions   • Benazepril-Hydrochlorothiazide Cough     Other reaction(s): Cough   • Latex Hives       Current Outpatient Medications   Medication Instructions   • acetaminophen (TYLENOL) 650 mg, Oral, Every 8 Hours PRN   • aspirin 81 mg, Oral, Daily   • atorvastatin (LIPITOR) 80 mg, Oral, Daily   • BASAGLAR KWIKPEN 40 Units, Subcutaneous, 2 Times Daily  "  • clopidogrel (PLAVIX) 75 mg, Oral, Daily   • dilTIAZem CD (CARDIZEM CD) 120 mg, Oral, Daily   • fenofibrate (TRICOR) 145 mg, Oral, Daily   • furosemide (LASIX) 40 mg, Oral, Daily   • glipizide (GLUCOTROL) 10 mg, Oral, 2 Times Daily Before Meals   • Insulin Pen Needle (Pen Needles) 31G X 8 MM misc 1 each, Does not apply, 2 Times Daily, For Diabetes: E11.43   • metoprolol tartrate (LOPRESSOR) 25 mg, Oral, 2 Times Daily   • pantoprazole (PROTONIX) 40 mg, Oral, Daily   • PARoxetine (PAXIL) 40 mg, Oral, Every Morning       Vitals:    06/15/23 1146   BP: 124/82   BP Location: Right arm   Patient Position: Sitting   Pulse: 67   SpO2: 96%   Weight: 129 kg (284 lb)   Height: 177.8 cm (70\")     Body mass index is 40.75 kg/m².    PHYSICAL EXAM:    General Appearance:   · well developed  · well nourished  Neck:  · thyroid not enlarged  · supple  Respiratory:  · no respiratory distress  · normal breath sounds  · no rales  Cardiovascular:  · no jugular venous distention  · regular rhythm  · apical impulse normal  · S1 normal, S2 normal  · no S3, no S4   · no murmur  · no rub, no thrill  · lower extremity edema: none    Skin:   warm, dry    RESULTS:   Procedures    Results for orders placed during the hospital encounter of 05/02/23    Adult Transthoracic Echo Complete w/ Color, Spectral and Contrast if Necessary Per Protocol    Interpretation Summary  •  Left ventricular systolic function is normal. Calculated left ventricular EF = 57.2%  •  Left ventricular diastolic function was normal.  •  Estimated right ventricular systolic pressure from tricuspid regurgitation is normal (<35 mmHg).  •  There is a small (<1cm) circumferential pericardial effusion.    Labs:  Lab Results   Component Value Date    CHOL 154 05/03/2023    TRIG 258 (H) 05/03/2023    HDL 34 (L) 05/03/2023    LDL 78 05/03/2023    AST 15 05/16/2023    ALT 13 05/16/2023     Lab Results   Component Value Date    HGBA1C 9.60 (H) 05/03/2023     Creatinine   Date Value " Ref Range Status   05/16/2023 0.60 mg/dL Final   05/16/2023 0.60 0.60 - 1.30 mg/dL Final   05/16/2023 0.70 0.57 - 1.00 mg/dL Final   05/11/2023 0.90 0.57 - 1.00 mg/dL Final   05/03/2023 0.58 0.57 - 1.00 mg/dL Final   05/02/2023 0.40 (L) 0.60 - 1.30 mg/dL Final     Comment:     Serial Number: [id number]:  [id number]     No results found for: EGFRIFNONA      ASSESSMENT:  Problem List Items Addressed This Visit          Cardiac and Vasculature    Coronary artery disease of native artery of native heart with stable angina pectoris - Primary    Overview     3/7/23: 2V cabg with free radial and lewis at [facility] dr. Palomino.  Left Heart Cath 5/2/23:  Conclusion        •  80% hazy OM 2 branch stenosis status post intervention with a Xience Skypoint 2.5 x 15 mm drug-eluting stent.  •  Patent LIMA to LAD.  Patent free radial to a diseased, small caliber diagonal branch with an 80% mid segment stenosis.  50% mid RCA stenosis with an RFR of 0.97.  •  LV pressures (S/D/E) 5 mmHg     Patient will continue atorvastatin,  diltiazem, metoprolol, aspirin and Plavix.    Discussed red flags and when to follow-up for those.         Hypertension    Pericarditis    Overview      She was prescribed colchicine by the cardiologist. Advised the patient to start the medication.          Hyperlipidemia LDL goal <70    Overview     Atorvastatin 80 mg daily  Fenofibrate 145 mg daily            Endocrine and Metabolic    Type 2 diabetes mellitus with diabetic autonomic neuropathy, with long-term current use of insulin    Overview     Lab Results   Component Value Date    HGBA1C 9.60 (H) 05/03/2023   Hemoglobin A1c slightly improved on Basaglar 40 units twice a day and glipizide 10 mg twice a day.    5/11/23:Foot exam and urine microalbumin completed.             PLAN:    1. Coronary artery disease  CABG x2 [facility] 03/2023  [facility] 5/2/23 with ERNESTO to OM2, patent LIMA to LAD and patent radial to small diseased diagonal, 50% mid RCA with normal RFR.   Echo with  normal EF  Continue ASA, Plavix, high dose statin     2. 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 Plan for tapering over longer period of time     3.   Hypertension   Goal <130/80mmHg   BP well controlled, continue current medicines     4.  Hyperlipidemia  LDL 78  Continue Lipitor 80mg     5. DM2  A1C 9.6%  Consider SGLT2 inhibitor if additional agent needed due to cardiovascular benefits, however defer to her endocrinologist       Advance Care Planning   ACP discussion was held with the patient during this visit. Patient does not have an advance directive, declines further assistance.          Follow-up   • Return in about 4 months (around 10/15/2023).      Scribed for Saeed Temple MD by Yanira Arias PA-C. 6/15/2023  12:17 EDT    I,Saeed Temple M.D., personally performed the services described in this documentation as scribed by the above named individual in my presence, and it is both accurate and complete.    Saeed Temple MD, FACC, [facility]

## 2023-06-15 ENCOUNTER — OFFICE VISIT (OUTPATIENT)
Dept: CARDIOLOGY | Facility: CLINIC | Age: 58
End: 2023-06-15
Payer: MEDICAID

## 2023-06-15 VITALS
OXYGEN SATURATION: 96 % | BODY MASS INDEX: 40.66 KG/M2 | WEIGHT: 284 LBS | DIASTOLIC BLOOD PRESSURE: 82 MMHG | HEIGHT: 70 IN | SYSTOLIC BLOOD PRESSURE: 124 MMHG | HEART RATE: 67 BPM

## 2023-06-15 DIAGNOSIS — I10 PRIMARY HYPERTENSION: ICD-10-CM

## 2023-06-15 DIAGNOSIS — M79.601 PAIN AND SWELLING OF RIGHT UPPER EXTREMITY: ICD-10-CM

## 2023-06-15 DIAGNOSIS — Z79.4 TYPE 2 DIABETES MELLITUS WITH DIABETIC AUTONOMIC NEUROPATHY, WITH LONG-TERM CURRENT USE OF INSULIN: ICD-10-CM

## 2023-06-15 DIAGNOSIS — I25.118 CORONARY ARTERY DISEASE OF NATIVE ARTERY OF NATIVE HEART WITH STABLE ANGINA PECTORIS: Primary | ICD-10-CM

## 2023-06-15 DIAGNOSIS — E11.43 TYPE 2 DIABETES MELLITUS WITH DIABETIC AUTONOMIC NEUROPATHY, WITH LONG-TERM CURRENT USE OF INSULIN: ICD-10-CM

## 2023-06-15 DIAGNOSIS — I31.9 PERICARDITIS, UNSPECIFIED CHRONICITY, UNSPECIFIED TYPE: ICD-10-CM

## 2023-06-15 DIAGNOSIS — E78.5 HYPERLIPIDEMIA LDL GOAL <70: ICD-10-CM

## 2023-06-15 DIAGNOSIS — M79.89 PAIN AND SWELLING OF RIGHT UPPER EXTREMITY: ICD-10-CM

## 2023-06-15 RX ORDER — COLCHICINE 0.6 MG/1
TABLET ORAL
Qty: 118 TABLET | Refills: 0 | Status: SHIPPED | OUTPATIENT
Start: 2023-06-15 | End: 2023-09-27

## 2023-07-31 ENCOUNTER — HOSPITAL ENCOUNTER (EMERGENCY)
Facility: HOSPITAL | Age: 58
Discharge: HOME | End: 2023-07-31
Attending: EMERGENCY MEDICINE | Admitting: EMERGENCY MEDICINE
Payer: MEDICAID

## 2023-07-31 ENCOUNTER — APPOINTMENT (OUTPATIENT)
Dept: GENERAL RADIOLOGY | Facility: HOSPITAL | Age: 58
End: 2023-07-31
Payer: MEDICAID

## 2023-07-31 ENCOUNTER — APPOINTMENT (OUTPATIENT)
Dept: CARDIOLOGY | Facility: HOSPITAL | Age: 58
End: 2023-07-31
Payer: MEDICAID

## 2023-07-31 ENCOUNTER — TELEPHONE (OUTPATIENT)
Dept: INTERNAL MEDICINE | Facility: CLINIC | Age: 58
End: 2023-07-31

## 2023-07-31 ENCOUNTER — OFFICE VISIT (OUTPATIENT)
Dept: INTERNAL MEDICINE | Facility: CLINIC | Age: 58
End: 2023-07-31
Payer: MEDICAID

## 2023-07-31 VITALS
RESPIRATION RATE: 20 BRPM | OXYGEN SATURATION: 97 % | WEIGHT: 279 LBS | SYSTOLIC BLOOD PRESSURE: 145 MMHG | HEART RATE: 71 BPM | HEIGHT: 70 IN | DIASTOLIC BLOOD PRESSURE: 98 MMHG | TEMPERATURE: 98.7 F | BODY MASS INDEX: 39.94 KG/M2

## 2023-07-31 VITALS
HEART RATE: 108 BPM | TEMPERATURE: 96.8 F | SYSTOLIC BLOOD PRESSURE: 146 MMHG | WEIGHT: 279.6 LBS | HEIGHT: 70 IN | DIASTOLIC BLOOD PRESSURE: 102 MMHG | BODY MASS INDEX: 40.03 KG/M2 | RESPIRATION RATE: 20 BRPM | OXYGEN SATURATION: 98 %

## 2023-07-31 DIAGNOSIS — R07.9 CHEST PAIN, UNSPECIFIED TYPE: ICD-10-CM

## 2023-07-31 DIAGNOSIS — L03.116 CELLULITIS OF LEFT LOWER EXTREMITY WITHOUT FOOT: Primary | ICD-10-CM

## 2023-07-31 DIAGNOSIS — N39.0 URINARY TRACT INFECTION WITHOUT HEMATURIA, SITE UNSPECIFIED: ICD-10-CM

## 2023-07-31 DIAGNOSIS — M54.2 NECK PAIN ON LEFT SIDE: ICD-10-CM

## 2023-07-31 DIAGNOSIS — R07.9 CHEST PAIN, UNSPECIFIED TYPE: Primary | ICD-10-CM

## 2023-07-31 DIAGNOSIS — R06.00 DYSPNEA, UNSPECIFIED TYPE: ICD-10-CM

## 2023-07-31 DIAGNOSIS — L03.116 CELLULITIS OF LEFT LOWER EXTREMITY: ICD-10-CM

## 2023-07-31 DIAGNOSIS — B35.1 ONYCHOMYCOSIS OF GREAT TOE: ICD-10-CM

## 2023-07-31 DIAGNOSIS — R50.9 FEVER, UNSPECIFIED FEVER CAUSE: ICD-10-CM

## 2023-07-31 LAB
ALBUMIN SERPL-MCNC: 4.1 G/DL (ref 3.5–5.2)
ALBUMIN/GLOB SERPL: 1.2 G/DL
ALP SERPL-CCNC: 80 U/L (ref 39–117)
ALT SERPL W P-5'-P-CCNC: 16 U/L (ref 1–33)
ANION GAP SERPL CALCULATED.3IONS-SCNC: 19 MMOL/L (ref 5–15)
AST SERPL-CCNC: 16 U/L (ref 1–32)
BACTERIA UR QL AUTO: ABNORMAL /HPF
BASOPHILS # BLD AUTO: 0.05 10*3/MM3 (ref 0–0.2)
BASOPHILS NFR BLD AUTO: 0.5 % (ref 0–1.5)
BH CV UPPER VENOUS LEFT SUBCLAVIAN AUGMENT: NORMAL
BH CV UPPER VENOUS LEFT SUBCLAVIAN COMPRESS: NORMAL
BH CV UPPER VENOUS LEFT SUBCLAVIAN PHASIC: NORMAL
BH CV UPPER VENOUS LEFT SUBCLAVIAN SPONT: NORMAL
BH CV UPPER VENOUS RIGHT AXILLARY AUGMENT: NORMAL
BH CV UPPER VENOUS RIGHT AXILLARY COMPRESS: NORMAL
BH CV UPPER VENOUS RIGHT AXILLARY PHASIC: NORMAL
BH CV UPPER VENOUS RIGHT AXILLARY SPONT: NORMAL
BH CV UPPER VENOUS RIGHT BASILIC FOREARM COMPRESS: NORMAL
BH CV UPPER VENOUS RIGHT BASILIC UPPER COMPRESS: NORMAL
BH CV UPPER VENOUS RIGHT BRACHIAL AUGMENT: NORMAL
BH CV UPPER VENOUS RIGHT BRACHIAL COMPRESS: NORMAL
BH CV UPPER VENOUS RIGHT BRACHIAL PHASIC: NORMAL
BH CV UPPER VENOUS RIGHT BRACHIAL SPONT: NORMAL
BH CV UPPER VENOUS RIGHT CEPHALIC FOREARM COMPRESS: NORMAL
BH CV UPPER VENOUS RIGHT CEPHALIC UPPER COMPRESS: NORMAL
BH CV UPPER VENOUS RIGHT INTERNAL JUGULAR AUGMENT: NORMAL
BH CV UPPER VENOUS RIGHT INTERNAL JUGULAR COMPRESS: NORMAL
BH CV UPPER VENOUS RIGHT INTERNAL JUGULAR PHASIC: NORMAL
BH CV UPPER VENOUS RIGHT INTERNAL JUGULAR SPONT: NORMAL
BH CV UPPER VENOUS RIGHT SUBCLAVIAN AUGMENT: NORMAL
BH CV UPPER VENOUS RIGHT SUBCLAVIAN COMPRESS: NORMAL
BH CV UPPER VENOUS RIGHT SUBCLAVIAN PHASIC: NORMAL
BH CV UPPER VENOUS RIGHT SUBCLAVIAN SPONT: NORMAL
BH CV UPPER VENOUS RIGHT ULNAR COMPRESS: NORMAL
BH CV VAS PRELIMINARY FINDINGS SCRIPTING: 1
BILIRUB SERPL-MCNC: 0.5 MG/DL (ref 0–1.2)
BILIRUB UR QL STRIP: ABNORMAL
BUN SERPL-MCNC: 19 MG/DL (ref 6–20)
BUN/CREAT SERPL: 21.8 (ref 7–25)
CALCIUM SPEC-SCNC: 9.1 MG/DL (ref 8.6–10.5)
CHLORIDE SERPL-SCNC: 101 MMOL/L (ref 98–107)
CLARITY UR: ABNORMAL
CO2 SERPL-SCNC: 16 MMOL/L (ref 22–29)
COLOR UR: ABNORMAL
CREAT SERPL-MCNC: 0.87 MG/DL (ref 0.57–1)
D DIMER PPP FEU-MCNC: 0.55 MCGFEU/ML (ref 0–0.55)
DEPRECATED RDW RBC AUTO: 44.4 FL (ref 37–54)
EGFRCR SERPLBLD CKD-EPI 2021: 78.8 ML/MIN/1.73
EOSINOPHIL # BLD AUTO: 0.17 10*3/MM3 (ref 0–0.4)
EOSINOPHIL NFR BLD AUTO: 1.8 % (ref 0.3–6.2)
ERYTHROCYTE [DISTWIDTH] IN BLOOD BY AUTOMATED COUNT: 15.6 % (ref 12.3–15.4)
FLUAV RNA RESP QL NAA+PROBE: NOT DETECTED
FLUBV RNA RESP QL NAA+PROBE: NOT DETECTED
GLOBULIN UR ELPH-MCNC: 3.4 GM/DL
GLUCOSE SERPL-MCNC: 199 MG/DL (ref 65–99)
GLUCOSE UR STRIP-MCNC: NEGATIVE MG/DL
HCT VFR BLD AUTO: 42.7 % (ref 34–46.6)
HGB BLD-MCNC: 13.7 G/DL (ref 12–15.9)
HGB UR QL STRIP.AUTO: NEGATIVE
HOLD SPECIMEN: NORMAL
HYALINE CASTS UR QL AUTO: ABNORMAL /LPF
IMM GRANULOCYTES # BLD AUTO: 0.05 10*3/MM3 (ref 0–0.05)
IMM GRANULOCYTES NFR BLD AUTO: 0.5 % (ref 0–0.5)
KETONES UR QL STRIP: ABNORMAL
LEUKOCYTE ESTERASE UR QL STRIP.AUTO: ABNORMAL
LYMPHOCYTES # BLD AUTO: 3.16 10*3/MM3 (ref 0.7–3.1)
LYMPHOCYTES NFR BLD AUTO: 33.7 % (ref 19.6–45.3)
MAGNESIUM SERPL-MCNC: 1.7 MG/DL (ref 1.6–2.6)
MCH RBC QN AUTO: 25.7 PG (ref 26.6–33)
MCHC RBC AUTO-ENTMCNC: 32.1 G/DL (ref 31.5–35.7)
MCV RBC AUTO: 80 FL (ref 79–97)
MONOCYTES # BLD AUTO: 0.92 10*3/MM3 (ref 0.1–0.9)
MONOCYTES NFR BLD AUTO: 9.8 % (ref 5–12)
NEUTROPHILS NFR BLD AUTO: 5.03 10*3/MM3 (ref 1.7–7)
NEUTROPHILS NFR BLD AUTO: 53.7 % (ref 42.7–76)
NITRITE UR QL STRIP: POSITIVE
NRBC BLD AUTO-RTO: 0 /100 WBC (ref 0–0.2)
NT-PROBNP SERPL-MCNC: 236.2 PG/ML (ref 0–900)
PH UR STRIP.AUTO: <=5 [PH] (ref 5–8)
PLATELET # BLD AUTO: 342 10*3/MM3 (ref 140–450)
PMV BLD AUTO: 10.8 FL (ref 6–12)
POTASSIUM SERPL-SCNC: 3.8 MMOL/L (ref 3.5–5.2)
PROT SERPL-MCNC: 7.5 G/DL (ref 6–8.5)
PROT UR QL STRIP: ABNORMAL
QT INTERVAL: 400 MS
QTC INTERVAL: 425 MS
RBC # BLD AUTO: 5.34 10*6/MM3 (ref 3.77–5.28)
RBC # UR STRIP: ABNORMAL /HPF
REF LAB TEST METHOD: ABNORMAL
SARS-COV-2 RNA RESP QL NAA+PROBE: NOT DETECTED
SODIUM SERPL-SCNC: 136 MMOL/L (ref 136–145)
SP GR UR STRIP: 1.02 (ref 1–1.03)
SQUAMOUS #/AREA URNS HPF: ABNORMAL /HPF
TROPONIN T SERPL HS-MCNC: 14 NG/L
TROPONIN T SERPL HS-MCNC: 15 NG/L
UROBILINOGEN UR QL STRIP: ABNORMAL
WBC # UR STRIP: ABNORMAL /HPF
WBC NRBC COR # BLD: 9.38 10*3/MM3 (ref 3.4–10.8)
WHOLE BLOOD HOLD COAG: NORMAL
WHOLE BLOOD HOLD SPECIMEN: NORMAL

## 2023-07-31 PROCEDURE — 25010000002 ONDANSETRON PER 1 MG: Performed by: EMERGENCY MEDICINE

## 2023-07-31 PROCEDURE — 36415 COLL VENOUS BLD VENIPUNCTURE: CPT

## 2023-07-31 PROCEDURE — 81001 URINALYSIS AUTO W/SCOPE: CPT | Performed by: EMERGENCY MEDICINE

## 2023-07-31 PROCEDURE — 3080F DIAST BP >= 90 MM HG: CPT | Performed by: NURSE PRACTITIONER

## 2023-07-31 PROCEDURE — 99283 EMERGENCY DEPT VISIT LOW MDM: CPT | Performed by: PHYSICIAN ASSISTANT

## 2023-07-31 PROCEDURE — 3077F SYST BP >= 140 MM HG: CPT | Performed by: NURSE PRACTITIONER

## 2023-07-31 PROCEDURE — 87636 SARSCOV2 & INF A&B AMP PRB: CPT | Performed by: EMERGENCY MEDICINE

## 2023-07-31 PROCEDURE — 71045 X-RAY EXAM CHEST 1 VIEW: CPT

## 2023-07-31 PROCEDURE — 96375 TX/PRO/DX INJ NEW DRUG ADDON: CPT

## 2023-07-31 PROCEDURE — 93971 EXTREMITY STUDY: CPT

## 2023-07-31 PROCEDURE — 99284 EMERGENCY DEPT VISIT MOD MDM: CPT

## 2023-07-31 PROCEDURE — 85379 FIBRIN DEGRADATION QUANT: CPT | Performed by: EMERGENCY MEDICINE

## 2023-07-31 PROCEDURE — 80053 COMPREHEN METABOLIC PANEL: CPT | Performed by: EMERGENCY MEDICINE

## 2023-07-31 PROCEDURE — 99213 OFFICE O/P EST LOW 20 MIN: CPT | Performed by: NURSE PRACTITIONER

## 2023-07-31 PROCEDURE — 85025 COMPLETE CBC W/AUTO DIFF WBC: CPT | Performed by: EMERGENCY MEDICINE

## 2023-07-31 PROCEDURE — 25010000002 CEFTRIAXONE PER 250 MG: Performed by: EMERGENCY MEDICINE

## 2023-07-31 PROCEDURE — 96365 THER/PROPH/DIAG IV INF INIT: CPT

## 2023-07-31 PROCEDURE — 93971 EXTREMITY STUDY: CPT | Performed by: INTERNAL MEDICINE

## 2023-07-31 PROCEDURE — 93005 ELECTROCARDIOGRAM TRACING: CPT | Performed by: EMERGENCY MEDICINE

## 2023-07-31 PROCEDURE — 84484 ASSAY OF TROPONIN QUANT: CPT | Performed by: EMERGENCY MEDICINE

## 2023-07-31 PROCEDURE — 3046F HEMOGLOBIN A1C LEVEL >9.0%: CPT | Performed by: NURSE PRACTITIONER

## 2023-07-31 PROCEDURE — 83735 ASSAY OF MAGNESIUM: CPT | Performed by: EMERGENCY MEDICINE

## 2023-07-31 PROCEDURE — 83880 ASSAY OF NATRIURETIC PEPTIDE: CPT | Performed by: EMERGENCY MEDICINE

## 2023-07-31 PROCEDURE — 93000 ELECTROCARDIOGRAM COMPLETE: CPT | Performed by: NURSE PRACTITIONER

## 2023-07-31 RX ORDER — SULFAMETHOXAZOLE AND TRIMETHOPRIM 800; 160 MG/1; MG/1
2 TABLET ORAL 2 TIMES DAILY
Qty: 28 TABLET | Refills: 0 | Status: SHIPPED | OUTPATIENT
Start: 2023-07-31 | End: 2023-08-07

## 2023-07-31 RX ORDER — ONDANSETRON 4 MG/1
4 TABLET, ORALLY DISINTEGRATING ORAL 4 TIMES DAILY PRN
Qty: 15 TABLET | Refills: 0 | Status: SHIPPED | OUTPATIENT
Start: 2023-07-31

## 2023-07-31 RX ORDER — SODIUM CHLORIDE 0.9 % (FLUSH) 0.9 %
10 SYRINGE (ML) INJECTION AS NEEDED
Status: DISCONTINUED | OUTPATIENT
Start: 2023-07-31 | End: 2023-07-31 | Stop reason: HOSPADM

## 2023-07-31 RX ORDER — CEPHALEXIN 500 MG/1
500 CAPSULE ORAL 4 TIMES DAILY
Qty: 40 CAPSULE | Refills: 0 | Status: SHIPPED | OUTPATIENT
Start: 2023-07-31 | End: 2023-08-10

## 2023-07-31 RX ORDER — ONDANSETRON 2 MG/ML
4 INJECTION INTRAMUSCULAR; INTRAVENOUS
Status: DISCONTINUED | OUTPATIENT
Start: 2023-07-31 | End: 2023-07-31 | Stop reason: HOSPADM

## 2023-07-31 RX ADMIN — SODIUM CHLORIDE 1000 ML: 9 INJECTION, SOLUTION INTRAVENOUS at 14:12

## 2023-07-31 RX ADMIN — SODIUM CHLORIDE 1000 MG: 900 INJECTION INTRAVENOUS at 14:13

## 2023-07-31 RX ADMIN — ONDANSETRON 4 MG: 2 INJECTION INTRAMUSCULAR; INTRAVENOUS at 15:04

## 2023-08-04 LAB
QT INTERVAL: 404 MS
QTC INTERVAL: 420 MS

## 2023-08-09 DIAGNOSIS — F33.1 MODERATE EPISODE OF RECURRENT MAJOR DEPRESSIVE DISORDER: ICD-10-CM

## 2023-08-09 NOTE — TELEPHONE ENCOUNTER
Patient needs a refill on:  venlafaxine XR (Effexor XR) 37.5 MG 24 hr capsule    Estefani is having a hard time sleeping and wants to know what she can take to help her sleep.   Please call: [phone]

## 2023-08-10 RX ORDER — VENLAFAXINE HYDROCHLORIDE 37.5 MG/1
CAPSULE, EXTENDED RELEASE ORAL
Qty: 60 CAPSULE | Refills: 0 | Status: SHIPPED | OUTPATIENT
Start: 2023-08-10

## 2023-08-10 NOTE — TELEPHONE ENCOUNTER
Tried to reach patient no answer left voicemail to return call.     HUB OK TO READ:    She can try melatonin 5 mg over the counter.

## 2023-08-15 ENCOUNTER — OFFICE VISIT (OUTPATIENT)
Dept: ENDOCRINOLOGY | Facility: CLINIC | Age: 58
End: 2023-08-15
Payer: MEDICAID

## 2023-08-15 ENCOUNTER — PRIOR AUTHORIZATION (OUTPATIENT)
Dept: ENDOCRINOLOGY | Facility: CLINIC | Age: 58
End: 2023-08-15

## 2023-08-15 VITALS
WEIGHT: 287.4 LBS | DIASTOLIC BLOOD PRESSURE: 78 MMHG | HEIGHT: 70 IN | OXYGEN SATURATION: 97 % | HEART RATE: 65 BPM | SYSTOLIC BLOOD PRESSURE: 126 MMHG | BODY MASS INDEX: 41.14 KG/M2

## 2023-08-15 DIAGNOSIS — I25.810 CORONARY ARTERY DISEASE INVOLVING AUTOLOGOUS VEIN CORONARY BYPASS GRAFT WITHOUT ANGINA PECTORIS: ICD-10-CM

## 2023-08-15 DIAGNOSIS — E78.2 MIXED HYPERLIPIDEMIA: ICD-10-CM

## 2023-08-15 DIAGNOSIS — E11.65 TYPE 2 DIABETES MELLITUS WITH HYPERGLYCEMIA, WITH LONG-TERM CURRENT USE OF INSULIN: Primary | ICD-10-CM

## 2023-08-15 DIAGNOSIS — Z79.4 TYPE 2 DIABETES MELLITUS WITH HYPERGLYCEMIA, WITH LONG-TERM CURRENT USE OF INSULIN: Primary | ICD-10-CM

## 2023-08-15 LAB
EXPIRATION DATE: ABNORMAL
EXPIRATION DATE: NORMAL
GLUCOSE BLDC GLUCOMTR-MCNC: 209 MG/DL (ref 70–130)
HBA1C MFR BLD: 8.2 %
Lab: ABNORMAL
Lab: NORMAL

## 2023-08-15 PROCEDURE — 3052F HG A1C>EQUAL 8.0%<EQUAL 9.0%: CPT | Performed by: INTERNAL MEDICINE

## 2023-08-15 PROCEDURE — 99204 OFFICE O/P NEW MOD 45 MIN: CPT | Performed by: INTERNAL MEDICINE

## 2023-08-15 PROCEDURE — 83036 HEMOGLOBIN GLYCOSYLATED A1C: CPT | Performed by: INTERNAL MEDICINE

## 2023-08-15 PROCEDURE — 3078F DIAST BP <80 MM HG: CPT | Performed by: INTERNAL MEDICINE

## 2023-08-15 PROCEDURE — 82947 ASSAY GLUCOSE BLOOD QUANT: CPT | Performed by: INTERNAL MEDICINE

## 2023-08-15 PROCEDURE — 3074F SYST BP LT 130 MM HG: CPT | Performed by: INTERNAL MEDICINE

## 2023-08-15 RX ORDER — PROCHLORPERAZINE 25 MG/1
1 SUPPOSITORY RECTAL ONCE
Qty: 1 EACH | Refills: 0 | Status: SHIPPED | OUTPATIENT
Start: 2023-08-15 | End: 2023-08-15

## 2023-08-15 RX ORDER — PROCHLORPERAZINE 25 MG/1
1 SUPPOSITORY RECTAL TAKE AS DIRECTED
Qty: 3 EACH | Refills: 11 | Status: SHIPPED | OUTPATIENT
Start: 2023-08-15

## 2023-08-15 RX ORDER — SEMAGLUTIDE 0.68 MG/ML
0.25 INJECTION, SOLUTION SUBCUTANEOUS WEEKLY
Qty: 3 ML | Refills: 1 | Status: SHIPPED | OUTPATIENT
Start: 2023-08-15

## 2023-08-15 RX ORDER — PROCHLORPERAZINE 25 MG/1
1 SUPPOSITORY RECTAL TAKE AS DIRECTED
Qty: 1 EACH | Refills: 3 | Status: SHIPPED | OUTPATIENT
Start: 2023-08-15

## 2023-08-15 RX ORDER — INSULIN ASPART 100 [IU]/ML
INJECTION, SUSPENSION SUBCUTANEOUS
Qty: 90 ML | Refills: 1 | Status: SHIPPED | OUTPATIENT
Start: 2023-08-15

## 2023-08-15 NOTE — PROGRESS NOTES
Chief Complaint   Patient presents with    Diabetes        Referring Provider  Leeanna Tovar APRN HPI Brenda Mary Blancas is a 57 y.o. female had concerns including Diabetes.    Diabetes was diagnosed about 6-7 years ago. Insulin was started 3/2023.  Complications include neuropathy. Has seen podiatry. Having issues with her left great toe. Toes are numb. She had an issue with a string pulling the toenail and the toe was bleeding. Sees podiatry again in October.   Last ophtho exam was a little over a year ago. No known retinopathy.   Current medications for diabetes include glipizide 10 mg BID and basaglar 40 units twice daily.  Was on jardiance in the past but had issues with coverage. Was on januvia in the past and had improvement in glucose control.   Was on metformin but taken off after recent CAD. Was tolerating without issue. Script wasn't refilled.   She checks her blood sugar 3 times per day. Is interested in CGM.   Bgs range 180s-290s. Has rare hypoglycemia, only if not eating.   Last October she improved her diet and lost 50 lbs with dietary changes. Limited to 1700 calories per day. Changed to artificial sweetener. Avoids pasta/breads.   Has cut back on all intake in general, all carbs/sugar/bread/carbs.       Past Medical History:   Diagnosis Date    Abnormal ECG May 2 2023    Asthma March 2 2023    CHF (congestive heart failure) May 4 2023    Congenital heart disease March 2 2023    Coronary artery disease March 2 2023    Diabetes mellitus     Heart attack 03/02/2023    Hyperlipidemia     Hypertension     Right middle lobe pulmonary nodule 05/02/2023    3/2/23: CT of chest FINDINGS: There is an 8 mm nodule in the right middle lobe. There is a 5  mm subpleural nodule in the left lower lobe. Mild diffuse interstitial  changes are present. The ascending aorta is ectatic. There is no  pericardial or pleural effusion. There is no adenopathy. There is no  dissection. There is no PE. The spleen is  mildly enlarged at 15 cm. -Referral to lung nodule clini    Sleep apnea 2017    Type 2 diabetes mellitus 2016     Past Surgical History:   Procedure Laterality Date    ADENOIDECTOMY      CARDIAC CATHETERIZATION N/A 2023    Procedure: Left Heart Cath;  Surgeon: Korey Rene MD;  Location: [facility] CATH INVASIVE LOCATION;  Service: Cardiovascular;  Laterality: N/A;    CARDIAC SURGERY  2023     SECTION      CORONARY ARTERY BYPASS GRAFT  2023    CORONARY STENT PLACEMENT  May 2 2023    TONSILLECTOMY        Family History   Problem Relation Age of Onset    Heart attack Mother     Asthma Mother     Heart attack Father     Heart disease Father     Hypertension Father     Diabetes Father     Obesity Father     Breast cancer Sister     Asthma Sister     Cancer Sister     Lung cancer Paternal Aunt     Heart attack Maternal Grandmother     Heart attack Paternal Grandmother     Heart attack Paternal Grandfather     Kidney disease Sister       Social History     Socioeconomic History    Marital status:    Tobacco Use    Smoking status: Never    Smokeless tobacco: Never   Vaping Use    Vaping Use: Never used   Substance and Sexual Activity    Alcohol use: Never    Drug use: Never    Sexual activity: Not Currently     Partners: Male     Birth control/protection: Post-menopausal      Allergies   Allergen Reactions    Benazepril-Hydrochlorothiazide Cough     Other reaction(s): Cough    Latex Hives      Current Outpatient Medications on File Prior to Visit   Medication Sig Dispense Refill    acetaminophen (TYLENOL) 650 MG 8 hr tablet Take 1 tablet by mouth Every 8 (Eight) Hours As Needed.      aspirin 81 MG EC tablet Take 1 tablet by mouth Daily.      atorvastatin (LIPITOR) 80 MG tablet Take 1 tablet by mouth Daily. 90 tablet 1    Bacillus Coagulans-Inulin (Probiotic) 1-250 BILLION-MG capsule Take 1 capsule by mouth Daily. 30 capsule 0    clopidogrel (PLAVIX) 75 MG tablet Take 1 tablet by  mouth Daily. 30 tablet 3    colchicine 0.6 MG tablet Take 1 tablet by mouth 2 (Two) Times a Day for 14 days, THEN 1 tablet Daily for 90 days. 118 tablet 0    Diclofenac Sodium (VOLTAREN) 1 % gel gel Apply 4 g topically to the appropriate area as directed As Needed.      dilTIAZem CD (CARDIZEM CD) 120 MG 24 hr capsule Take 1 capsule by mouth Daily. 30 capsule 3    fenofibrate (TRICOR) 145 MG tablet Take 1 tablet by mouth Daily. 90 tablet 1    furosemide (LASIX) 40 MG tablet Take 1 tablet by mouth Daily. 30 tablet 2    glipizide (GLUCOTROL) 10 MG tablet Take 1 tablet by mouth 2 (Two) Times a Day Before Meals.      metoprolol tartrate (LOPRESSOR) 25 MG tablet Take 1 tablet by mouth 2 (Two) Times a Day.      ondansetron ODT (ZOFRAN-ODT) 4 MG disintegrating tablet Place 1 tablet on the tongue 4 (Four) Times a Day As Needed for Nausea or Vomiting. 15 tablet 0    pantoprazole (PROTONIX) 40 MG EC tablet Take 1 tablet by mouth Daily. 90 tablet 1    venlafaxine XR (Effexor XR) 37.5 MG 24 hr capsule Take 2 tablets daily 60 capsule 0    [DISCONTINUED] Insulin Glargine (BASAGLAR KWIKPEN) 100 UNIT/ML injection pen Inject 40 Units under the skin into the appropriate area as directed 2 (Two) Times a Day.      [DISCONTINUED] Insulin Pen Needle (Pen Needles) 31G X 8 MM misc 1 each 2 (Two) Times a Day. For Diabetes: E11.43 100 each 5     No current facility-administered medications on file prior to visit.        Review of Systems   Constitutional: Negative.    HENT: Negative.     Eyes: Negative.    Respiratory: Negative.     Cardiovascular: Negative.    Gastrointestinal: Negative.    Endocrine:        See HPI re glucose  Hair loss   Genitourinary: Negative.    Musculoskeletal:  Positive for arthralgias, back pain and myalgias.   Skin: Negative.    Allergic/Immunologic: Positive for environmental allergies.   Hematological: Negative.    Psychiatric/Behavioral:  Positive for dysphoric mood and sleep disturbance. The patient is  "nervous/anxious.       /78   Pulse 65   Ht 177.8 cm (70\")   Wt 130 kg (287 lb 6.4 oz)   SpO2 97%   BMI 41.24 kg/mý      Physical Exam  Feet:      Right foot:      Skin integrity: Callus (bruising/dried blood on right great toe) present.      Toenail Condition: Right toenails are abnormally thick.      Left foot:      Toenail Condition: Left toenails are abnormally thick.     Constitutional:  well developed; well nourished  no acute distress  obese - Body mass index is 41.24 kg/mý.   ENT/Thyroid: no thyromegaly  no palpable nodules   Eyes: EOM intact  Conjunctiva: clear   Respiratory:  breathing is unlabored  clear to auscultation bilaterally   Cardiovascular:  regular rate and rhythm, S1, S2 normal, no murmur, click, rub or gallop   Chest:  Not performed.   Abdomen: Not performed.   : Not performed.   Musculoskeletal: negative findings:  ROM of all joints is normal, no deformities present   Skin: dry and warm   Neuro: normal without focal findings and mental status, speech normal, alert and oriented x3   Psych: oriented to time, place and person, mood and affect are within normal limits     LABS AND IMAGING  CMP:  Lab Results   Component Value Date    BUN 19 07/31/2023    CREATININE 0.87 07/31/2023    BCR 21.8 07/31/2023     07/31/2023    K 3.8 07/31/2023    CO2 16.0 (L) 07/31/2023    CALCIUM 9.1 07/31/2023    ALBUMIN 4.1 07/31/2023    BILITOT 0.5 07/31/2023    ALKPHOS 80 07/31/2023    AST 16 07/31/2023    ALT 16 07/31/2023     Lipid Panel:  Lab Results   Component Value Date    CHOL 154 05/03/2023    TRIG 258 (H) 05/03/2023    HDL 34 (L) 05/03/2023    VLDL 42 (H) 05/03/2023    LDL 78 05/03/2023     HbA1c:  Lab Results   Component Value Date    HGBA1C 8.2 08/15/2023    HGBA1C 9.60 (H) 05/03/2023     Glucose:  Lab Results   Component Value Date    POCGLU 209 (A) 08/15/2023     Lab Results   Component Value Date    POCMALB 10 05/11/2023     TSH:  Lab Results   Component Value Date    TSH 1.020 " 03/06/2023       Assessment and Plan    Diagnoses and all orders for this visit:    1. Type 2 diabetes mellitus with hyperglycemia, with long-term current use of insulin (Primary)  Uncontrolled with hyperglycemia.  Complicated by neuropathy.  A1c improved to 8.2.  Consider resuming metformin in the future.  Resume Jardiance 25 mg daily.  Caution for possible UTI or yeast infections.  Start Ozempic 0.25 mg weekly and titrate up on dose monthly as tolerated.  Caution for possible GI side effects.  No history of pancreatitis no family history of MEN or MTC.  Discontinue Basaglar and transition to mixed insulin 40 units twice daily.  CGM prescription sent to the pharmacy.  Labs are up-to-date from May.  POC microalbumin normal from May.  Monofilament updated by PCP recently and patient is also following with podiatry.  Ophtho exam is due and patient will have the report sent here.  -     POC Glycosylated Hemoglobin (Hb A1C)  -     POC Glucose, Blood  -     empagliflozin (Jardiance) 25 MG tablet tablet; Take 1 tablet by mouth Daily.  Dispense: 30 tablet; Refill: 5  -     Insulin Pen Needle 32G X 4 MM misc; 1 each 2 (Two) Times a Day.  Dispense: 200 each; Refill: 3  -     insulin aspart prot & aspart (NovoLOG Mix 70/30 FlexPen) (70-30) 100 UNIT/ML suspension pen-injector injection; 40 units twice daily 15 minutes before meals  Dispense: 90 mL; Refill: 1  -     Semaglutide,0.25 or 0.5MG/DOS, (Ozempic, 0.25 or 0.5 MG/DOSE,) 2 MG/3ML solution pen-injector; Inject 0.25 mg under the skin into the appropriate area as directed 1 (One) Time Per Week. Increase to 0.5 mg weekly after 4 weeks  Dispense: 3 mL; Refill: 1  -     Continuous Blood Gluc  (Dexcom G6 ) device; 1 each 1 (One) Time for 1 dose.  Dispense: 1 each; Refill: 0  -     Continuous Blood Gluc Transmit (Dexcom G6 Transmitter) misc; 1 each Take As Directed.  Dispense: 1 each; Refill: 3  -     Continuous Blood Gluc Sensor (Dexcom G6 Sensor); 1 each Take  As Directed.  Dispense: 3 each; Refill: 11    2. Mixed hyperlipidemia  On fenofibrate and atorvastatin.  LDL at goal from May.  Triglycerides high due to uncontrolled diabetes.  Will monitor at least yearly.    3. Coronary artery disease involving autologous vein coronary bypass graft without angina pectoris  Recent CABG in March 2023.  SGLT2 inhibitors and GLP-1 receptor agonist indicated.  Diabetes management as above.       Return in about 3 months (around 11/15/2023) for next scheduled follow up, next avail. The patient was instructed to contact the clinic with any interval questions or concerns.    Salena Chamberlain, DO   Endocrinologist    Please note that portions of this note were completed with a voice recognition program.

## 2023-08-15 NOTE — PATIENT INSTRUCTIONS
Please have the eye exam report faxed to [phone].    Add jardiance 25 mg daily (morning).   Start ozempic 0.25 mg weekly. Increase to 0.5 mg weekly after 4 weeks. This dose can be increased monthly as tolerated.   Continue glipizide 10 mg twice daily.   Change basaglar to novolog 70/30 or humalog 75/25 40 units twice daily, 15 minutes before breakfast and supper.     Call/message if your glucose levels are < 100 or > 200 often.

## 2023-08-15 NOTE — TELEPHONE ENCOUNTER
Estefani Blancas ([facility]auer: [id number])  Rx #: [id number]  Ozempic (0.25 or 0.5 MG/DOSE) 2MG/3ML pen-injectors     Form  [employer] [place] Medicaid ePA Form 2017 NCPDP  Created  2 hours ago  Sent to Plan  2 hours ago  Plan Response  2 hours ago  Submit Clinical Questions  2 hours ago  Determination  Favorable  2 hours ago  Message from Plan  The request has been approved. The authorization is effective from 08/15/2023 to 08/14/2024   all other ROS negative except as per HPI

## 2023-08-16 ENCOUNTER — PRIOR AUTHORIZATION (OUTPATIENT)
Dept: ENDOCRINOLOGY | Facility: CLINIC | Age: 58
End: 2023-08-16
Payer: MEDICAID

## 2023-08-16 NOTE — TELEPHONE ENCOUNTER
Estefani Blancas Key: [id number] - PA Case ID: [id number] - Rx #: [id number] help? Call us at [phone]  Outcome  Approvedon August 15  The request has been approved. The authorization is effective from 08/15/2023 to 08/14/2024, as long as the member is enrolled in their current health plan. The request was approved as submitted. A written notification letter will follow with additional details.  Drug  Jardiance 25MG tablets  Form  [facility] [place] [facility] 2017 NCPDP

## 2023-08-17 ENCOUNTER — TELEPHONE (OUTPATIENT)
Dept: ENDOCRINOLOGY | Facility: CLINIC | Age: 58
End: 2023-08-17
Payer: MEDICAID

## 2023-08-17 NOTE — TELEPHONE ENCOUNTER
Pt called to say that [facility] in [place] sent a fax to us and they need to know why she was put on jardiance and why she needs a dexcom sensor  Please call pt if you have questions  [phone]

## 2023-08-17 NOTE — TELEPHONE ENCOUNTER
Spoke with pharmacy.  They got all 3 prescriptions, Ozempic, Jardiance and dexcom sensors to go through.  Patient notified.

## 2023-08-18 ENCOUNTER — PATIENT ROUNDING (BHMG ONLY) (OUTPATIENT)
Dept: ENDOCRINOLOGY | Facility: CLINIC | Age: 58
End: 2023-08-18
Payer: MEDICAID

## 2023-08-18 NOTE — PROGRESS NOTES
A [employer] message has been sent to the patient for patient rounding with [facility]

## 2023-09-01 DIAGNOSIS — F33.1 MODERATE EPISODE OF RECURRENT MAJOR DEPRESSIVE DISORDER: ICD-10-CM

## 2023-09-01 RX ORDER — VENLAFAXINE HYDROCHLORIDE 37.5 MG/1
CAPSULE, EXTENDED RELEASE ORAL
Qty: 60 CAPSULE | Refills: 0 | Status: SHIPPED | OUTPATIENT
Start: 2023-09-01

## 2023-10-02 DIAGNOSIS — F33.1 MODERATE EPISODE OF RECURRENT MAJOR DEPRESSIVE DISORDER: ICD-10-CM

## 2023-10-02 RX ORDER — VENLAFAXINE HYDROCHLORIDE 37.5 MG/1
CAPSULE, EXTENDED RELEASE ORAL
Qty: 60 CAPSULE | Refills: 0 | Status: SHIPPED | OUTPATIENT
Start: 2023-10-02

## 2023-10-02 NOTE — TELEPHONE ENCOUNTER
Caller: Edward Blancastim Hernandez    Relationship: Self    Best call back number: [phone]    What medications are you currently taking:   Current Outpatient Medications on File Prior to Visit   Medication Sig Dispense Refill    acetaminophen (TYLENOL) 650 MG 8 hr tablet Take 1 tablet by mouth Every 8 (Eight) Hours As Needed.      aspirin 81 MG EC tablet Take 1 tablet by mouth Daily.      atorvastatin (LIPITOR) 80 MG tablet Take 1 tablet by mouth Daily. 90 tablet 1    Bacillus Coagulans-Inulin (Probiotic) 1-250 BILLION-MG capsule Take 1 capsule by mouth Daily. 30 capsule 0    clopidogrel (PLAVIX) 75 MG tablet Take 1 tablet by mouth Daily. 30 tablet 3    Continuous Blood Gluc Sensor (Dexcom G6 Sensor) 1 each Take As Directed. 3 each 11    Continuous Blood Gluc Transmit (Dexcom G6 Transmitter) misc 1 each Take As Directed. 1 each 3    Diclofenac Sodium (VOLTAREN) 1 % gel gel Apply 4 g topically to the appropriate area as directed As Needed.      dilTIAZem CD (CARDIZEM CD) 120 MG 24 hr capsule Take 1 capsule by mouth Daily. 30 capsule 3    empagliflozin (Jardiance) 25 MG tablet tablet Take 1 tablet by mouth Daily. 30 tablet 5    fenofibrate (TRICOR) 145 MG tablet Take 1 tablet by mouth Daily. 90 tablet 1    furosemide (LASIX) 40 MG tablet Take 1 tablet by mouth Daily. 30 tablet 2    glipizide (GLUCOTROL) 10 MG tablet Take 1 tablet by mouth 2 (Two) Times a Day Before Meals.      insulin aspart prot & aspart (NovoLOG Mix 70/30 FlexPen) (70-30) 100 UNIT/ML suspension pen-injector injection 40 units twice daily 15 minutes before meals 90 mL 1    Insulin Pen Needle 32G X 4 MM misc 1 each 2 (Two) Times a Day. 200 each 3    metoprolol tartrate (LOPRESSOR) 25 MG tablet Take 1 tablet by mouth 2 (Two) Times a Day.      ondansetron ODT (ZOFRAN-ODT) 4 MG disintegrating tablet Place 1 tablet on the tongue 4 (Four) Times a Day As Needed for Nausea or Vomiting. 15 tablet 0    pantoprazole (PROTONIX) 40 MG EC tablet Take 1 tablet by  mouth Daily. 90 tablet 1    Semaglutide,0.25 or 0.5MG/DOS, (Ozempic, 0.25 or 0.5 MG/DOSE,) 2 MG/3ML solution pen-injector Inject 0.25 mg under the skin into the appropriate area as directed 1 (One) Time Per Week. Increase to 0.5 mg weekly after 4 weeks 3 mL 1    venlafaxine XR (EFFEXOR-XR) 37.5 MG 24 hr capsule Take 2 capsules by mouth once daily 60 capsule 0     No current facility-administered medications on file prior to visit.     Which medication are you concerned about: EFFEXOR    Who prescribed you this medication: BREEDING     What are your concerns: PATIENT STATED THAT THE MEDICATION WORKED BUT NOT AS WELL AS SHE HOPED.  PATIENT ASKED IF SHE NEEDS TO BE SEEN OR GURJIT BREEDING WANT TO REFILL   unknown

## 2023-10-10 RX ORDER — DILTIAZEM HYDROCHLORIDE 120 MG/1
120 CAPSULE, COATED, EXTENDED RELEASE ORAL DAILY
Qty: 30 CAPSULE | Refills: 3 | Status: SHIPPED | OUTPATIENT
Start: 2023-10-10

## 2023-10-10 NOTE — TELEPHONE ENCOUNTER
Last office visit (LOV) for chronic condition 07/31/23  Next office visit (NOV) not scheduled yet

## 2023-10-25 DIAGNOSIS — Z79.4 TYPE 2 DIABETES MELLITUS WITH HYPERGLYCEMIA, WITH LONG-TERM CURRENT USE OF INSULIN: ICD-10-CM

## 2023-10-25 DIAGNOSIS — E11.65 TYPE 2 DIABETES MELLITUS WITH HYPERGLYCEMIA, WITH LONG-TERM CURRENT USE OF INSULIN: Primary | ICD-10-CM

## 2023-10-25 DIAGNOSIS — E11.65 TYPE 2 DIABETES MELLITUS WITH HYPERGLYCEMIA, WITH LONG-TERM CURRENT USE OF INSULIN: ICD-10-CM

## 2023-10-25 DIAGNOSIS — Z79.4 TYPE 2 DIABETES MELLITUS WITH HYPERGLYCEMIA, WITH LONG-TERM CURRENT USE OF INSULIN: Primary | ICD-10-CM

## 2023-10-25 RX ORDER — SEMAGLUTIDE 0.68 MG/ML
INJECTION, SOLUTION SUBCUTANEOUS
Qty: 3 ML | Refills: 0 | Status: SHIPPED | OUTPATIENT
Start: 2023-10-25 | End: 2023-10-25 | Stop reason: ALTCHOICE

## 2023-10-25 RX ORDER — SEMAGLUTIDE 1.34 MG/ML
1 INJECTION, SOLUTION SUBCUTANEOUS
Qty: 3 ML | Refills: 1 | Status: SHIPPED | OUTPATIENT
Start: 2023-10-25

## 2023-10-25 NOTE — TELEPHONE ENCOUNTER
Rx Refill Note  Requested Prescriptions     Pending Prescriptions Disp Refills    Ozempic, 0.25 or 0.5 MG/DOSE, 2 MG/3ML solution pen-injector [Pharmacy Med Name: Ozempic (0.25 or 0.5 MG/DOSE) 2 MG/3ML Subcutaneous Solution Pen-injector] 3 mL 0     Sig: INJECT 0.25MG UNDER THE SKIN INTO THE APPROPRIATE AREA ONE TIME PER WEEK AS DIRECTED. INCREASE TO 0.5MG WEEKLY AFTER 4 WEEKS      Last office visit with prescribing clinician: 8/15/2023   Last telemedicine visit with prescribing clinician: Visit date not found   Next office visit with prescribing clinician: 1/16/2024                         Would you like a call back once the refill request has been completed: [] Yes [] No    If the office needs to give you a call back, can they leave a voicemail: [] Yes [] No    Holly Oglesby MA  10/25/23, 11:57 EDT

## 2023-11-01 NOTE — PROGRESS NOTES
OFFICE VISIT  NOTE  [facility] CARDIOLOGY      Name: Estefani Blancas    Date: 2023  MRN:  [id number]  :  1965      REFERRING/PRIMARY PROVIDER:  Leeanna Tovar APRN     Chief Complaint   Patient presents with    Coronary artery disease of native artery of native heart wi     HPI: Estefani Blancas is a 57 y.o. female who presents today for follow up for NSTEMI and post-pericardiotomy syndrome. Associated history of HTN, HLD, diabetes and obesity. She had CABG x2 at [facility] in 2023. Presented with worsening chest pain 2023, EKG on arrival concerning for subtle ESTEVAN in inferior leads, and she was taken for urgent cath. This showed 80% hazy OM2 stenosis, s/p ERNESTO, patent LIMA to LAD and patent free radial to diseased small diagonal branch. 50% mid RCA with normal RFR. Echo showed normal EF, small <1cm pericardial effusion. Post-cath she complained of pleuritic chest pain and Colchicine helped resolve the pain, she was kept on it for 3 months, and has not had any further chest pain since coming off Colchicine. She has been off it for about a month. Denies any chest pain, unusual dyspnea with exertion. She has occasional twinges. She was recently started on Ozempic and Jardiance by her endocrinologist and is tolerating both well. She stays active, walks up to a mile a day and also does her housework.     ROS:Pertinent positives as listed in the HPI.  All other systems reviewed and negative.    Past Medical History:   Diagnosis Date    Abnormal ECG May 2 2023    Asthma 2023    CHF (congestive heart failure) May 4 2023    Congenital heart disease 2023    Coronary artery disease 2023    Diabetes mellitus     Heart attack 2023    Hyperlipidemia     Hypertension     Right middle lobe pulmonary nodule 2023    3/2/23: CT of chest FINDINGS: There is an 8 mm nodule in the right middle lobe. There is a 5  mm subpleural nodule in the left lower lobe. Mild  diffuse interstitial  changes are present. The ascending aorta is ectatic. There is no  pericardial or pleural effusion. There is no adenopathy. There is no  dissection. There is no PE. The spleen is mildly enlarged at 15 cm. -Referral to lung nodule clini    Sleep apnea 2017    Type 2 diabetes mellitus 2016       Past Surgical History:   Procedure Laterality Date    ADENOIDECTOMY      CARDIAC CATHETERIZATION N/A 2023    Procedure: Left Heart Cath;  Surgeon: Korey Rene MD;  Location: [facility] CATH INVASIVE LOCATION;  Service: Cardiovascular;  Laterality: N/A;    CARDIAC SURGERY  2023     SECTION      CORONARY ARTERY BYPASS GRAFT  2023    CORONARY STENT PLACEMENT  May 2 2023    TONSILLECTOMY         Social History     Socioeconomic History    Marital status:    Tobacco Use    Smoking status: Never    Smokeless tobacco: Never   Vaping Use    Vaping Use: Never used   Substance and Sexual Activity    Alcohol use: Never    Drug use: Never    Sexual activity: Not Currently     Partners: Male     Birth control/protection: Post-menopausal       Family History   Problem Relation Age of Onset    Heart attack Mother     Asthma Mother     Heart attack Father     Heart disease Father     Hypertension Father     Diabetes Father     Obesity Father     Breast cancer Sister     Asthma Sister     Cancer Sister     Lung cancer Paternal Aunt     Heart attack Maternal Grandmother     Heart attack Paternal Grandmother     Heart attack Paternal Grandfather     Kidney disease Sister         Allergies   Allergen Reactions    Benazepril-Hydrochlorothiazide Cough     Other reaction(s): Cough    Latex Hives       Current Outpatient Medications   Medication Instructions    acetaminophen (TYLENOL) 650 mg, Oral, Every 8 Hours PRN    aspirin 81 mg, Oral, Daily    atorvastatin (LIPITOR) 80 mg, Oral, Daily    B-D ULTRAFINE III SHORT PEN 31G X 8 MM misc USE 1 TWICE DAILY    Bacillus Coagulans-Inulin  "(Probiotic) 1-250 BILLION-MG capsule 1 capsule, Oral, Daily    clopidogrel (PLAVIX) 75 mg, Oral, Daily    Continuous Blood Gluc  (Dexcom G6 ) device See Admin Instructions    Continuous Blood Gluc Sensor (Dexcom G6 Sensor) 1 each, Does not apply, Take As Directed    Continuous Blood Gluc Transmit (Dexcom G6 Transmitter) misc 1 each, Does not apply, Take As Directed    Diclofenac Sodium (VOLTAREN) 4 g, Topical, As Needed    dilTIAZem CD (CARDIZEM CD) 120 mg, Oral, Daily    empagliflozin (JARDIANCE) 25 mg, Oral, Daily    fenofibrate (TRICOR) 145 mg, Oral, Daily    furosemide (LASIX) 40 mg, Oral, Daily    glipizide (GLUCOTROL) 10 mg, Oral, 2 Times Daily Before Meals    insulin aspart prot & aspart (NovoLOG Mix 70/30 FlexPen) (70-30) 100 UNIT/ML suspension pen-injector injection 40 units twice daily 15 minutes before meals    Insulin Pen Needle 32G X 4 MM misc 1 each, Does not apply, 2 Times Daily    metoprolol tartrate (LOPRESSOR) 25 mg, Oral, 2 Times Daily    ondansetron ODT (ZOFRAN-ODT) 4 mg, Translingual, 4 Times Daily PRN    Ozempic (1 MG/DOSE) 1 mg, Subcutaneous, Every 7 Days    pantoprazole (PROTONIX) 40 mg, Oral, Daily    venlafaxine XR (EFFEXOR-XR) 37.5 MG 24 hr capsule Take 2 capsules by mouth once daily       Vitals:    11/02/23 1047   BP: 132/80   BP Location: Right arm   Patient Position: Sitting   Cuff Size: Adult   Pulse: 80   SpO2: 97%   Weight: 125 kg (276 lb 3.2 oz)   Height: 177.8 cm (70\")     Body mass index is 39.63 kg/m².    PHYSICAL EXAM:    General Appearance:   well developed  well nourished  Neck:  thyroid not enlarged  supple  Respiratory:  no respiratory distress  normal breath sounds  no rales  Cardiovascular:  no jugular venous distention  regular rhythm  apical impulse normal  S1 normal, S2 normal  no S3, no S4   no murmur  no rub, no thrill  lower extremity edema: none    Skin:   warm, dry    RESULTS:   Procedures    Results for orders placed during the hospital encounter " "of 05/02/23    Adult Transthoracic Echo Complete w/ Color, Spectral and Contrast if Necessary Per Protocol    Interpretation Summary    Left ventricular systolic function is normal. Calculated left ventricular EF = 57.2%    Left ventricular diastolic function was normal.    Estimated right ventricular systolic pressure from tricuspid regurgitation is normal (<35 mmHg).    There is a small (<1cm) circumferential pericardial effusion.  Labs:  Lab Results   Component Value Date    CHOL 154 05/03/2023    TRIG 258 (H) 05/03/2023    HDL 34 (L) 05/03/2023    LDL 78 05/03/2023    AST 16 07/31/2023    ALT 16 07/31/2023     Lab Results   Component Value Date    HGBA1C 8.2 08/15/2023     Creatinine   Date Value Ref Range Status   07/31/2023 0.87 0.57 - 1.00 mg/dL Final   05/16/2023 0.60 mg/dL Final   05/16/2023 0.60 0.60 - 1.30 mg/dL Final   05/16/2023 0.70 0.57 - 1.00 mg/dL Final   05/11/2023 0.90 0.57 - 1.00 mg/dL Final   05/02/2023 0.40 (L) 0.60 - 1.30 mg/dL Final     Comment:     Serial Number: [id number]:  [id number]     No results found for: \"EGFRIFNONA\"      ASSESSMENT:  Problem List Items Addressed This Visit          Cardiac and Vasculature    Coronary artery disease of native artery of native heart with stable angina pectoris - Primary    Overview     3/7/23: 2V cabg with free radial and lewis at [facility] dr. Palomino.  Left Heart Cath 5/2/23:  Conclusion          80% hazy OM 2 branch stenosis status post intervention with a Xience Skypoint 2.5 x 15 mm drug-eluting stent.    Patent LIMA to LAD.  Patent free radial to a diseased, small caliber diagonal branch with an 80% mid segment stenosis.  50% mid RCA stenosis with an RFR of 0.97.    LV pressures (S/D/E) 5 mmHg     Patient will continue atorvastatin,  diltiazem, metoprolol, aspirin and Plavix.    Discussed red flags and when to follow-up for those.         Hypertension    NSTEMI, initial episode of care    Pericarditis    Overview      She was prescribed colchicine by " the cardiologist. Advised the patient to start the medication.          Hyperlipidemia LDL goal <70    Overview     Atorvastatin 80 mg daily  Fenofibrate 145 mg daily            Endocrine and Metabolic    Type 2 diabetes mellitus with diabetic autonomic neuropathy, with long-term current use of insulin    Overview     Lab Results   Component Value Date    HGBA1C 9.60 (H) 05/03/2023   Hemoglobin A1c slightly improved on Basaglar 40 units twice a day and glipizide 10 mg twice a day.    5/11/23:Foot exam and urine microalbumin completed.             Pulmonary and Pneumonias    Pleuritic chest pain       PLAN:  Coronary artery disease  CABG x2 [facility] 03/2023  [facility] 5/2/23 with ERNESTO to OM2, patent LIMA to LAD and patent radial to small diseased diagonal, 50% mid RCA with normal RFR.   Echo with normal EF  Continue ASA, Plavix, high dose statin      2. Pleuritic chest pain/ post-pericardiotomy syndrome   CTA Chest 5/16 negative for acute findings, no PE, subcentimeter nodules which patient is aware of   S/p Colchicine for 3 months with resolution   No recurrent pleuritic pain since her second course of colchicine      3.   Hypertension   Goal <130/80mmHg   BP well controlled, continue current regimen      4.  Hyperlipidemia  LDL 78  Continue Lipitor 80mg      5. DM2  A1C 9.6%  Now follows with Dr. Chamberlain, and started on Ozempic and Jardiance, tolerating well    Continue Jardiance for cardioprotective benefits as well        Advance Care Planning   ACP discussion was held with the patient during this visit. Patient does not have an advance directive, declines further assistance.          Follow-up   Return in about 9 months (around 8/2/2024) for with RDS.      Electronically signed by Yanira Arias PA-C, 11/02/23, 11:14 AM EDT.

## 2023-11-02 ENCOUNTER — OFFICE VISIT (OUTPATIENT)
Dept: CARDIOLOGY | Facility: CLINIC | Age: 58
End: 2023-11-02
Payer: MEDICAID

## 2023-11-02 VITALS
DIASTOLIC BLOOD PRESSURE: 80 MMHG | HEART RATE: 80 BPM | OXYGEN SATURATION: 97 % | HEIGHT: 70 IN | BODY MASS INDEX: 39.54 KG/M2 | SYSTOLIC BLOOD PRESSURE: 132 MMHG | WEIGHT: 276.2 LBS

## 2023-11-02 DIAGNOSIS — I25.118 CORONARY ARTERY DISEASE OF NATIVE ARTERY OF NATIVE HEART WITH STABLE ANGINA PECTORIS: Primary | ICD-10-CM

## 2023-11-02 DIAGNOSIS — E11.43 TYPE 2 DIABETES MELLITUS WITH DIABETIC AUTONOMIC NEUROPATHY, WITH LONG-TERM CURRENT USE OF INSULIN: ICD-10-CM

## 2023-11-02 DIAGNOSIS — I31.9 PERICARDITIS, UNSPECIFIED CHRONICITY, UNSPECIFIED TYPE: ICD-10-CM

## 2023-11-02 DIAGNOSIS — E78.5 HYPERLIPIDEMIA LDL GOAL <70: ICD-10-CM

## 2023-11-02 DIAGNOSIS — Z79.4 TYPE 2 DIABETES MELLITUS WITH DIABETIC AUTONOMIC NEUROPATHY, WITH LONG-TERM CURRENT USE OF INSULIN: ICD-10-CM

## 2023-11-02 DIAGNOSIS — I10 PRIMARY HYPERTENSION: ICD-10-CM

## 2023-11-02 DIAGNOSIS — R07.81 PLEURITIC CHEST PAIN: ICD-10-CM

## 2023-11-02 DIAGNOSIS — I21.4 NSTEMI, INITIAL EPISODE OF CARE: ICD-10-CM

## 2023-11-02 PROCEDURE — 3075F SYST BP GE 130 - 139MM HG: CPT | Performed by: PHYSICIAN ASSISTANT

## 2023-11-02 PROCEDURE — 3079F DIAST BP 80-89 MM HG: CPT | Performed by: PHYSICIAN ASSISTANT

## 2023-11-02 PROCEDURE — 99213 OFFICE O/P EST LOW 20 MIN: CPT | Performed by: PHYSICIAN ASSISTANT

## 2023-11-02 RX ORDER — PROCHLORPERAZINE 25 MG/1
SUPPOSITORY RECTAL SEE ADMIN INSTRUCTIONS
COMMUNITY
Start: 2023-08-15

## 2023-11-02 RX ORDER — PEN NEEDLE, DIABETIC 31 GX5/16"
NEEDLE, DISPOSABLE MISCELLANEOUS
COMMUNITY
Start: 2023-10-10

## 2023-11-07 RX ORDER — PROCHLORPERAZINE 25 MG/1
SUPPOSITORY RECTAL SEE ADMIN INSTRUCTIONS
Qty: 1 EACH | Refills: 0 | Status: SHIPPED | OUTPATIENT
Start: 2023-11-07

## 2023-11-07 NOTE — TELEPHONE ENCOUNTER
Rx Refill Note  Requested Prescriptions     Pending Prescriptions Disp Refills    Continuous Blood Gluc  (Dexcom G6 ) device [Pharmacy Med Name: DEXCOM G6   MIS] 1 each 0     Sig: USE AS DIRECTED      Last office visit with prescribing clinician: 8/15/2023   Last telemedicine visit with prescribing clinician: Visit date not found   Next office visit with prescribing clinician: 1/16/2024                         Would you like a call back once the refill request has been completed: [] Yes [] No    If the office needs to give you a call back, can they leave a voicemail: [] Yes [] No    Holly Oglesby MA  11/07/23, 09:37 EST

## 2023-11-07 NOTE — TELEPHONE ENCOUNTER
Rx Refill Note  Requested Prescriptions     Pending Prescriptions Disp Refills    Continuous Blood Gluc  (Dexcom G6 ) device [Pharmacy Med Name: DEXCOM G6   MIS] 1 each 0     Sig: USE AS DIRECTED      Last office visit with prescribing clinician: 8/15/2023     Next office visit with prescribing clinician: 1/16/2024

## 2023-11-09 ENCOUNTER — TELEPHONE (OUTPATIENT)
Dept: INTERNAL MEDICINE | Facility: CLINIC | Age: 58
End: 2023-11-09
Payer: MEDICAID

## 2023-11-09 NOTE — TELEPHONE ENCOUNTER
Caller: Estefani Blancas    Relationship: Self    Best call back number: [phone]    What is the best time to reach you: ANYTIME     Who are you requesting to speak with (clinical staff, provider,  specific staff member): NURSE      What was the call regarding: THE PATIENT WOULD LIKE TO TALK TO THE NURSE ABOUT THE COLOGAURD KIT SHE STATES THAT SHE CAN'T DO IT

## 2023-11-09 NOTE — TELEPHONE ENCOUNTER
Patient stated that she does not know if she will be able to do the cologuard because she only has one bowel movement in like a month.

## 2023-11-10 RX ORDER — POLYETHYLENE GLYCOL 3350 17 G/17G
17 POWDER, FOR SOLUTION ORAL DAILY
Qty: 850 G | Refills: 0 | Status: SHIPPED | OUTPATIENT
Start: 2023-11-10

## 2023-11-10 NOTE — TELEPHONE ENCOUNTER
I have called and notified her and she demonstrates appreciation and understanding. She will go to ER with severe symptoms

## 2023-11-10 NOTE — TELEPHONE ENCOUNTER
Estefani states that she is taking a one stool softener per day, with docusate sodium and sennaside. She stays hydrated and eats a lot of green vegetables. She states that due to her other medications she is not sure what else to take. She was going to further discuss this at her upcoming appointment

## 2023-11-10 NOTE — TELEPHONE ENCOUNTER
I will send in MiraLAX.  I would like her to do a cleanout with 8 scoops of MiraLAX in 32 ounces of liquid.  She can use sugar-free Gatorade.  If she is unable to have a bowel movement she needs to be seen.  Severe symptoms go to the ER.

## 2023-11-15 ENCOUNTER — OFFICE VISIT (OUTPATIENT)
Dept: INTERNAL MEDICINE | Facility: CLINIC | Age: 58
End: 2023-11-15
Payer: MEDICAID

## 2023-11-15 ENCOUNTER — TELEPHONE (OUTPATIENT)
Dept: INTERNAL MEDICINE | Facility: CLINIC | Age: 58
End: 2023-11-15

## 2023-11-15 VITALS
SYSTOLIC BLOOD PRESSURE: 140 MMHG | DIASTOLIC BLOOD PRESSURE: 84 MMHG | RESPIRATION RATE: 18 BRPM | BODY MASS INDEX: 39.8 KG/M2 | HEIGHT: 70 IN | TEMPERATURE: 97.1 F | HEART RATE: 88 BPM | WEIGHT: 278 LBS

## 2023-11-15 DIAGNOSIS — K59.00 CONSTIPATION, UNSPECIFIED CONSTIPATION TYPE: Primary | ICD-10-CM

## 2023-11-15 DIAGNOSIS — J01.00 ACUTE NON-RECURRENT MAXILLARY SINUSITIS: ICD-10-CM

## 2023-11-15 DIAGNOSIS — J30.89 ALLERGIC RHINITIS DUE TO OTHER ALLERGIC TRIGGER, UNSPECIFIED SEASONALITY: ICD-10-CM

## 2023-11-15 DIAGNOSIS — M16.0 PRIMARY OSTEOARTHRITIS OF BOTH HIPS: ICD-10-CM

## 2023-11-15 DIAGNOSIS — F33.1 MODERATE EPISODE OF RECURRENT MAJOR DEPRESSIVE DISORDER: ICD-10-CM

## 2023-11-15 PROCEDURE — 3079F DIAST BP 80-89 MM HG: CPT | Performed by: NURSE PRACTITIONER

## 2023-11-15 PROCEDURE — 3077F SYST BP >= 140 MM HG: CPT | Performed by: NURSE PRACTITIONER

## 2023-11-15 PROCEDURE — 99214 OFFICE O/P EST MOD 30 MIN: CPT | Performed by: NURSE PRACTITIONER

## 2023-11-15 PROCEDURE — 1159F MED LIST DOCD IN RCRD: CPT | Performed by: NURSE PRACTITIONER

## 2023-11-15 PROCEDURE — 3052F HG A1C>EQUAL 8.0%<EQUAL 9.0%: CPT | Performed by: NURSE PRACTITIONER

## 2023-11-15 PROCEDURE — 1160F RVW MEDS BY RX/DR IN RCRD: CPT | Performed by: NURSE PRACTITIONER

## 2023-11-15 RX ORDER — LACTULOSE 10 G/15ML
20 SOLUTION ORAL DAILY
Qty: 946 ML | Refills: 0 | Status: SHIPPED | OUTPATIENT
Start: 2023-11-15

## 2023-11-15 RX ORDER — AMOXICILLIN AND CLAVULANATE POTASSIUM 875; 125 MG/1; MG/1
1 TABLET, FILM COATED ORAL 2 TIMES DAILY
Qty: 20 TABLET | Refills: 0 | Status: SHIPPED | OUTPATIENT
Start: 2023-11-15

## 2023-11-15 RX ORDER — CETIRIZINE HYDROCHLORIDE 10 MG/1
10 TABLET ORAL DAILY
Qty: 90 TABLET | Refills: 1 | Status: SHIPPED | OUTPATIENT
Start: 2023-11-15

## 2023-11-15 NOTE — TELEPHONE ENCOUNTER
Patient stated that she is still planning on having the test completed. Patient will do the test soon.

## 2023-11-15 NOTE — PROGRESS NOTES
"Patient Name: Estefani Blancas  : 1965   MRN: [id number]     Chief Complaint:    Chief Complaint   Patient presents with    Depression    wart     On thumb    Allergies       History of Present Illness: Estefani Blancas is a 57 y.o. female presents to clinic today for a follow-up visit.    Constipation  Ms. Blancas reports that she had a bowel movement last week but has been experiencing constipation for weeks. She thinks her constipation began when she started Ozempic. She was not able to get the prescribed MiraLAX from the pharmacy due to a shortage. She is experiencing intermittent abdominal pain. She is having frequent episodes of nausea. She takes [employer] stool softener every day.     Sinusitis  The patient is sniffing a lot today. She has been dealing with allergies for the last week; however, she is concerned that she now has an upper respiratory infection or sinus infection. She complains of nasal drainage, congestion, as well as sinus pain and pressure in her bilateral maxillary sinuses. She has been taking Benadryl.     Depression  She states that her depression is \"horrible.\" She is taking Effexor 75 mg daily. She reports sleeping maybe 3 to 4 hours a day, and then will go at least 24 hours without sleeping again because her mind will not shut down. She notes that she constantly cries. She is under a lot of stress right now and feels like she is unable to function. She does not want to get out of bed in the mornings. She notes that she is not to the point of self-harm but has thought about discontinuing all her medications. She has thought about trying therapy.     Hip pain  The patient reports that when she gets up from sitting, her hips seem to lock up, which causes her to almost fall. She notes that for the past 30 years she has had intermittent problems with her sciatica. Her sciatica pain resolved after she suffered her first heart attack. She is trying to increase her exercise and now walks " 1 mile a day. She notes that she has been having to climb into her 's dump truck lately as it is their only means of transportation right now. She has been doing some exercises at home with weights. She denies any tenderness in the lateral aspect of her bilateral hips. She does have spinal osteoarthritis.       Diabetes mellitus  The patient has diabetes mellitus type 2. She takes Ozempic 1 mg weekly.  Her glucose is currently 154mg/dL, noting that she has not eaten anything today. She follows endocrinologist Dr. Chamberlain and is doing better. She started drinking zero sugar lemonade and uses Splenda in her tea.     Carpal tunnel  She mentions that she was diagnosed with carpal tunnel in bilateral hands. She has been experiencing a burning sensation in all her fingertips. She does not utilize wrist splints.    Hypertension  Her blood pressure is slightly elevated today which she attributes to stress. She confirms that she has a blood pressure monitor at home. When she went to the podiatrist last week her blood pressure was elevated to 198/160 mmHg. Her blood pressure was normal at her cardiology visit on 11/02/2023. Her heart rate has been normal.     The patient notes that she has not been diagnosed but thinks she might have restless leg syndrome. Her  noticed her feet moving when she was sleeping.      The patient is  to a man she wants to divorce. She has a daughter and grandsons.     The patient is taking over-the-counter Benadryl for allergies. She takes Effexor XR 75 mg once daily for depression disorder. For her diabetes mellitus type 2 she is currently taking Ozempic 1 mg, glipizide 10 mg twice a day, Jardiance, and NovoLog 70/30 taking 40 units twice daily. She takes metoprolol tartrate 25 mg and diltiazem  mg for hypertension.         7/15/21  IMPRESSION:  No acute displaced fracture of the cervical, thoracic or  lumbar spine with degenerative changes throughout.  Subjective      Review of System: Review of Systems   A review of systems was performed, and the pertinent positives are noted in the HPI.    Medications:     Current Outpatient Medications:     aspirin 81 MG EC tablet, Take 1 tablet by mouth Daily., Disp: , Rfl:     atorvastatin (LIPITOR) 80 MG tablet, Take 1 tablet by mouth Daily., Disp: 90 tablet, Rfl: 1    B-D ULTRAFINE III SHORT PEN 31G X 8 MM misc, USE 1 TWICE DAILY, Disp: , Rfl:     Bacillus Coagulans-Inulin (Probiotic) 1-250 BILLION-MG capsule, Take 1 capsule by mouth Daily., Disp: 30 capsule, Rfl: 0    clopidogrel (PLAVIX) 75 MG tablet, Take 1 tablet by mouth Daily., Disp: 30 tablet, Rfl: 3    Continuous Blood Gluc  (Dexcom G6 ) device, USE AS DIRECTED, Disp: 1 each, Rfl: 0    Continuous Blood Gluc Sensor (Dexcom G6 Sensor), 1 each Take As Directed., Disp: 3 each, Rfl: 11    Continuous Blood Gluc Transmit (Dexcom G6 Transmitter) misc, 1 each Take As Directed., Disp: 1 each, Rfl: 3    Diclofenac Sodium (VOLTAREN) 1 % gel gel, Apply 4 g topically to the appropriate area as directed As Needed., Disp: , Rfl:     dilTIAZem CD (CARDIZEM CD) 120 MG 24 hr capsule, Take 1 capsule by mouth Daily., Disp: 30 capsule, Rfl: 3    empagliflozin (Jardiance) 25 MG tablet tablet, Take 1 tablet by mouth Daily., Disp: 30 tablet, Rfl: 5    fenofibrate (TRICOR) 145 MG tablet, Take 1 tablet by mouth Daily., Disp: 90 tablet, Rfl: 1    furosemide (LASIX) 40 MG tablet, Take 1 tablet by mouth Daily., Disp: 30 tablet, Rfl: 2    glipizide (GLUCOTROL) 10 MG tablet, Take 1 tablet by mouth 2 (Two) Times a Day Before Meals., Disp: , Rfl:     insulin aspart prot & aspart (NovoLOG Mix 70/30 FlexPen) (70-30) 100 UNIT/ML suspension pen-injector injection, 40 units twice daily 15 minutes before meals, Disp: 90 mL, Rfl: 1    Insulin Pen Needle 32G X 4 MM misc, 1 each 2 (Two) Times a Day., Disp: 200 each, Rfl: 3    metoprolol tartrate (LOPRESSOR) 25 MG tablet, Take 1 tablet by mouth 2 (Two)  "Times a Day., Disp: , Rfl:     ondansetron ODT (ZOFRAN-ODT) 4 MG disintegrating tablet, Place 1 tablet on the tongue 4 (Four) Times a Day As Needed for Nausea or Vomiting., Disp: 15 tablet, Rfl: 0    pantoprazole (PROTONIX) 40 MG EC tablet, Take 1 tablet by mouth Daily., Disp: 90 tablet, Rfl: 1    polyethylene glycol (MiraLax) 17 GM/SCOOP powder, Take 17 g by mouth Daily., Disp: 850 g, Rfl: 0    Semaglutide, 1 MG/DOSE, (Ozempic, 1 MG/DOSE,) 4 MG/3ML solution pen-injector, Inject 1 mg under the skin into the appropriate area as directed Every 7 (Seven) Days., Disp: 3 mL, Rfl: 1    venlafaxine XR (EFFEXOR-XR) 37.5 MG 24 hr capsule, Take 2 capsules by mouth once daily, Disp: 60 capsule, Rfl: 0    amoxicillin-clavulanate (AUGMENTIN) 875-125 MG per tablet, Take 1 tablet by mouth 2 (Two) Times a Day., Disp: 20 tablet, Rfl: 0    Cariprazine HCl (Vraylar) 1.5 MG capsule capsule, Take 1 capsule by mouth Daily., Disp: 7 capsule, Rfl: 0    cetirizine (zyrTEC) 10 MG tablet, Take 1 tablet by mouth Daily., Disp: 90 tablet, Rfl: 1    lactulose (CHRONULAC) 10 GM/15ML solution, Take 30 mL by mouth Daily., Disp: 946 mL, Rfl: 0    Allergies:   Allergies   Allergen Reactions    Benazepril-Hydrochlorothiazide Cough     Other reaction(s): Cough    Latex Hives       Objective     Physical Exam:   Vital Signs:   Vitals:    11/15/23 1207 11/15/23 1301   BP: 168/66 140/84   BP Location: Left arm Left arm   Patient Position: Sitting Sitting   Cuff Size: Adult Adult   Pulse: 88    Resp: 18    Temp: 97.1 °F (36.2 °C)    TempSrc: Infrared    Weight: 126 kg (278 lb)    Height: 177.8 cm (70\")    PainSc:   2        Physical Exam  Constitutional:       Appearance: She is not ill-appearing.   HENT:      Right Ear: Tympanic membrane normal.      Left Ear: Tympanic membrane normal.      Nose: Congestion and rhinorrhea present.   Cardiovascular:      Rate and Rhythm: Normal rate and regular rhythm.      Heart sounds: No murmur heard.  Pulmonary:      " Effort: No respiratory distress.      Breath sounds: No stridor. No wheezing, rhonchi or rales.   Abdominal:      General: Bowel sounds are normal.      Tenderness: There is no abdominal tenderness.   Lymphadenopathy:      Cervical: No cervical adenopathy.   Skin:     General: Skin is warm and dry.   Psychiatric:         Mood and Affect: Affect is tearful.     Assessment / Plan      Assessment/Plan:   Diagnoses and all orders for this visit:    1. Constipation, unspecified constipation type (Primary)  -     lactulose (CHRONULAC) 10 GM/15ML solution; Take 30 mL by mouth Daily.  Dispense: 946 mL; Refill: 0    2. Acute non-recurrent maxillary sinusitis  -     amoxicillin-clavulanate (AUGMENTIN) 875-125 MG per tablet; Take 1 tablet by mouth 2 (Two) Times a Day.  Dispense: 20 tablet; Refill: 0    3. Moderate episode of recurrent major depressive disorder  -     Cariprazine HCl (Vraylar) 1.5 MG capsule capsule; Take 1 capsule by mouth Daily.  Dispense: 7 capsule; Refill: 0  -     [facility] - Swab,; Future    4. Primary osteoarthritis of both hips    5. Allergic rhinitis due to other allergic trigger, unspecified seasonality  -     cetirizine (zyrTEC) 10 MG tablet; Take 1 tablet by mouth Daily.  Dispense: 90 tablet; Refill: 1       Assessment and Plan  1. Constipation  She was previously prescribed MiraLAX, but the pharmacy was unable to fill due to shortage. In the meantime, she will start a laxative of lactulose taking 30 mg once daily. A prescription for lactulose 10 gm oral solution has been sent. It was recommended she try a fiber supplement like Metamucil.     2. Acute maxillary sinusitis  The patient will start a 10-day course of antibiotics. A prescription for amoxicillin-clavulanate (Augmentin) 875-125 has been sent to her preferred pharmacy.      3. . Depression  The patient complains of worsening depression even though she is currently taking 75 mg daily of Effexor. She will continue Effexor. She will start  Vraylar. We discussed side effects and how to monitor them. A prescription for Vraylar 1.5 mg has been sent. We discussed a referral to behavioral health. She was given samples of Vraylar in the office today. If her depression gets worse, especially if she starts having suicidal thoughts, she was advised to seek help and to call me or call 911. An order has been placed for [employer] testing.     4. Allergic Rhinitis  The patient will start cetirizine 10 mg daily.     5. Osteoarthritis of bilateral hips  The patient cannot take anti-inflammatories due to her heart. She was advised to take 1000 mg of Tylenol 3 times a day for pain. Discussed physical therapy today for strength training. It was recommended she increase her activity and to perform stretching exercises. She was given additional exercises on [facility] [employer].     6. Diabetes mellitus type 2  She will continue current medication regimen.     7. Hypertension  Her blood pressure is slightly elevated today to 140/84 mmHg, which she attributes to stress. She will continue metoprolol tartrate 25 mg and diltiazem  mg. She will monitor her blood pressure at home for the next several days and will report her readings on Monday.     Patient will follow up in 1 month.       Follow Up:   Return in about 4 weeks (around 12/13/2023) for Recheck.    BENNY Estrada RN  [facility]    Transcribed from ambient dictation for MARK Estrada by Haven Laguna.  11/15/23   15:31 EST    Patient or patient representative verbalized consent to the visit recording.  I have personally performed the services described in this document as transcribed by the above individual, and it is both accurate and complete.

## 2023-11-16 ENCOUNTER — HOSPITAL ENCOUNTER (OUTPATIENT)
Dept: CT IMAGING | Facility: HOSPITAL | Age: 58
Discharge: HOME | End: 2023-11-16
Admitting: INTERNAL MEDICINE
Payer: MEDICAID

## 2023-11-16 DIAGNOSIS — R91.1 LUNG NODULE: Primary | ICD-10-CM

## 2023-11-16 DIAGNOSIS — R91.1 LUNG NODULE: ICD-10-CM

## 2023-11-16 PROCEDURE — 71250 CT THORAX DX C-: CPT

## 2023-11-20 ENCOUNTER — TELEPHONE (OUTPATIENT)
Dept: INTERNAL MEDICINE | Facility: CLINIC | Age: 58
End: 2023-11-20
Payer: MEDICAID

## 2023-11-20 NOTE — TELEPHONE ENCOUNTER
PATIENT FORGOT TWO MORE ITEMS: SHE NEEDS SOMETHING PRESCRIBED FOR HEARTBURN INDIGESTION.    NEEDS PT FOR PAIN IN HER BACK.

## 2023-11-20 NOTE — TELEPHONE ENCOUNTER
Caller: Estefani Blancas    Relationship: Self    Best call back number: [phone]    What was the call regarding: V BREEDING WANTED PATIENT TO CALL IN TODAY WITH THIS INFO:    BP OVER WEEKEND: 148/89 - 140/78    VRAYLAR - TOLERATING WELL BUT STILL HAVING BOUTS OF CRYING SPELLS    YES SHE WOULD LIKE TO SEE SOMEONE ABOUT HER DEPRESSION    NEEDS A REFERRAL TO HEARING SPECIALIST. SHE DOES NOT THINK SHE IS HEARING VERY WELL.     BOWELS ARE MOVING.    Is it okay if the provider responds through MyChart: NO

## 2023-11-21 DIAGNOSIS — R12 HEARTBURN: ICD-10-CM

## 2023-11-21 DIAGNOSIS — F33.1 MODERATE EPISODE OF RECURRENT MAJOR DEPRESSIVE DISORDER: ICD-10-CM

## 2023-11-21 DIAGNOSIS — M54.50 ACUTE LOW BACK PAIN, UNSPECIFIED BACK PAIN LATERALITY, UNSPECIFIED WHETHER SCIATICA PRESENT: Primary | ICD-10-CM

## 2023-11-21 RX ORDER — FAMOTIDINE 20 MG/1
20 TABLET, FILM COATED ORAL 2 TIMES DAILY
Qty: 60 TABLET | Refills: 0 | Status: SHIPPED | OUTPATIENT
Start: 2023-11-21

## 2023-11-21 NOTE — TELEPHONE ENCOUNTER
I have sent in Pepcid 20 mg twice a day.  Is patient still taking pantoprazole as well?  She can try adding the Pepcid if she is taking the pantoprazole. I have sent a referral to behavioral health.  I have also sent a referral to PT.  If she does not hear from scheduling please give them a call.  Please let me know if she has any more questions or concerns.  We will follow-up as scheduled.

## 2023-11-22 ENCOUNTER — TELEPHONE (OUTPATIENT)
Dept: INTERNAL MEDICINE | Facility: CLINIC | Age: 58
End: 2023-11-22
Payer: MEDICAID

## 2023-11-22 DIAGNOSIS — H91.90 DECREASED HEARING, UNSPECIFIED LATERALITY: Primary | ICD-10-CM

## 2023-11-22 NOTE — TELEPHONE ENCOUNTER
Referral placed to audiology.  Have patient follow-up with scheduling if she does not get a call for an appointment.

## 2023-11-22 NOTE — TELEPHONE ENCOUNTER
Name: Yonny Estefani Hernandez    Relationship: Self    Best Callback Number: [phone]    HUB PROVIDED THE RELAY MESSAGE FROM THE OFFICE   PATIENT VOICED UNDERSTANDING AND HAS NO FURTHER QUESTIONS AT THIS TIME

## 2023-11-22 NOTE — TELEPHONE ENCOUNTER
I left a message on the patients voicemail to call our office back, office number provided.     HUB relay:    I have sent in Pepcid 20 mg twice a day.  Is patient still taking pantoprazole as well?  She can try adding the Pepcid if she is taking the pantoprazole. I have sent a referral to behavioral health.  I have also sent a referral to PT.  If she does not hear from scheduling please give them a call.  Please let me know if she has any more questions or concerns.  We will follow-up as scheduled.

## 2023-11-22 NOTE — TELEPHONE ENCOUNTER
Caller: Estefani Blancas    Relationship: Self    Best call back number: [phone]    Who are you requesting to speak with (clinical staff, provider,  specific staff member): CLINICAL    What was the call regarding: PATIENT IS ASKING FOR A RECOMMENDATION TO BE SEEN IN [facility] FOR HEARING TEST

## 2023-11-27 ENCOUNTER — TELEPHONE (OUTPATIENT)
Dept: INTERNAL MEDICINE | Facility: CLINIC | Age: 58
End: 2023-11-27
Payer: MEDICAID

## 2023-11-27 DIAGNOSIS — M54.50 ACUTE LOW BACK PAIN, UNSPECIFIED BACK PAIN LATERALITY, UNSPECIFIED WHETHER SCIATICA PRESENT: Primary | ICD-10-CM

## 2023-11-27 RX ORDER — ACETAMINOPHEN 500 MG
1000 TABLET ORAL 3 TIMES DAILY PRN
Qty: 30 TABLET | Refills: 0 | Status: SHIPPED | OUTPATIENT
Start: 2023-11-27

## 2023-11-27 NOTE — TELEPHONE ENCOUNTER
Caller: Estefani Blancas    Relationship: Self    Best call back number: [phone]    What medication are you requesting: TYLENOL 3000 MG PER DAY    What are your current symptoms: BACK AND HIP PAIN    How long have you been experiencing symptoms: 3 WEEKS    Have you had these symptoms before:    [x] Yes  [] No    Have you been treated for these symptoms before:   [x] Yes  [] No    If a prescription is needed, what is your preferred pharmacy and phone number:      [facility] [address] [place] [address] [phone] [place] [phone]      Additional notes: INSURANCE WILL PAY IF THIS IS PRESCRIBED

## 2023-12-12 ENCOUNTER — TELEPHONE (OUTPATIENT)
Dept: INTERNAL MEDICINE | Facility: CLINIC | Age: 58
End: 2023-12-12
Payer: MEDICAID

## 2023-12-12 DIAGNOSIS — F33.1 MODERATE EPISODE OF RECURRENT MAJOR DEPRESSIVE DISORDER: ICD-10-CM

## 2023-12-12 NOTE — TELEPHONE ENCOUNTER
I left a message on the patients voicemail to call our office back, office number provided.     HUB relay:  Please let patient know I sent Vraylar to her pharmacy.

## 2023-12-12 NOTE — TELEPHONE ENCOUNTER
Hub staff attempted to follow warm transfer process and was unsuccessful     Caller: Estefani Blancas    Relationship to patient: Self    Best call back number:  [phone]    PATIENT WAS GIVEN SAMPLES OF VRAYLAR AND IS REQUESTING A PRESCRIPTION FOR THIS TO BE CALLED INTO [facility] [address] [place] [address] [phone] [place] [phone] FX           REQUESTING A CALL  WHEN MEDICATION HAS BEEN CALLED IN

## 2023-12-13 ENCOUNTER — TELEPHONE (OUTPATIENT)
Dept: ENDOCRINOLOGY | Facility: CLINIC | Age: 58
End: 2023-12-13
Payer: MEDICAID

## 2023-12-13 NOTE — TELEPHONE ENCOUNTER
PT CALLED TO SEE IF WE RECEIVED THE FAX FROM [facility] FOR HER DIABETIC SHOES    PTS NUMBER  [phone]

## 2023-12-18 DIAGNOSIS — E11.65 TYPE 2 DIABETES MELLITUS WITH HYPERGLYCEMIA, WITH LONG-TERM CURRENT USE OF INSULIN: ICD-10-CM

## 2023-12-18 DIAGNOSIS — Z79.4 TYPE 2 DIABETES MELLITUS WITH HYPERGLYCEMIA, WITH LONG-TERM CURRENT USE OF INSULIN: ICD-10-CM

## 2023-12-18 RX ORDER — SEMAGLUTIDE 1.34 MG/ML
INJECTION, SOLUTION SUBCUTANEOUS
Qty: 3 ML | Refills: 0 | Status: SHIPPED | OUTPATIENT
Start: 2023-12-18

## 2023-12-18 NOTE — TELEPHONE ENCOUNTER
Rx Refill Note  Requested Prescriptions     Pending Prescriptions Disp Refills    Ozempic, 1 MG/DOSE, 4 MG/3ML solution pen-injector [Pharmacy Med Name: Ozempic (1 MG/DOSE) 4 MG/3ML Subcutaneous Solution Pen-injector] 3 mL 0     Sig: INJECT 1 MG SUBCUTANEOUSLY  ONCE A WEEK      Last office visit with prescribing clinician: 8/15/2023     Next office visit with prescribing clinician: 1/16/2024       Kalin Green MA  12/18/23, 13:28 EST

## 2023-12-18 NOTE — TELEPHONE ENCOUNTER
Patient called back about this to see if we have received forms. Looks like we have not received them and advised her to please have them refax.

## 2023-12-20 DIAGNOSIS — R12 HEARTBURN: ICD-10-CM

## 2023-12-20 RX ORDER — FAMOTIDINE 20 MG/1
20 TABLET, FILM COATED ORAL 2 TIMES DAILY
Qty: 60 TABLET | Refills: 0 | Status: SHIPPED | OUTPATIENT
Start: 2023-12-20

## 2023-12-29 ENCOUNTER — TELEPHONE (OUTPATIENT)
Dept: ENDOCRINOLOGY | Facility: CLINIC | Age: 58
End: 2023-12-29

## 2023-12-29 NOTE — TELEPHONE ENCOUNTER
PATIENT CALLED TO CHECK ON A FORM FOR DIABETIC SHOES THAT WAS SUPPOSED TO BE SENT TO [facility]. SHE STATES IT WAS SUPPOSED TO HAVE BEEN SENT A WEEK AND A HALF AGO. ASKED PATIENT TO CALL AND HAVE IT RESENT. ALSO ADVISED PATIENT DR. HERNANDEZ IS OUT OF THE OFFICE AND IT MIGHT TAKE UNTIL NEXT WEEK. PATIENT VERBALIZED UNDERSTANDING.  PATIENT PROVIDED THE FOLLOWING FAX NUMBER:  [phone].

## 2024-01-08 RX ORDER — PANTOPRAZOLE SODIUM 40 MG/1
40 TABLET, DELAYED RELEASE ORAL DAILY
Qty: 90 TABLET | Refills: 0 | Status: SHIPPED | OUTPATIENT
Start: 2024-01-08

## 2024-01-08 RX ORDER — ATORVASTATIN CALCIUM 80 MG/1
80 TABLET, FILM COATED ORAL DAILY
Qty: 90 TABLET | Refills: 0 | Status: SHIPPED | OUTPATIENT
Start: 2024-01-08

## 2024-01-09 ENCOUNTER — OFFICE VISIT (OUTPATIENT)
Dept: INTERNAL MEDICINE | Facility: CLINIC | Age: 59
End: 2024-01-09
Payer: MEDICAID

## 2024-01-09 VITALS
WEIGHT: 278.6 LBS | HEIGHT: 70 IN | RESPIRATION RATE: 22 BRPM | SYSTOLIC BLOOD PRESSURE: 130 MMHG | TEMPERATURE: 97.7 F | HEART RATE: 96 BPM | DIASTOLIC BLOOD PRESSURE: 88 MMHG | BODY MASS INDEX: 39.88 KG/M2

## 2024-01-09 DIAGNOSIS — M79.605 LEFT LEG PAIN: ICD-10-CM

## 2024-01-09 DIAGNOSIS — L30.9 ECZEMA, UNSPECIFIED TYPE: ICD-10-CM

## 2024-01-09 DIAGNOSIS — Z12.31 ENCOUNTER FOR SCREENING MAMMOGRAM FOR BREAST CANCER: ICD-10-CM

## 2024-01-09 DIAGNOSIS — Z23 ENCOUNTER FOR IMMUNIZATION: ICD-10-CM

## 2024-01-09 DIAGNOSIS — I10 PRIMARY HYPERTENSION: ICD-10-CM

## 2024-01-09 DIAGNOSIS — J30.89 ALLERGIC RHINITIS DUE TO OTHER ALLERGIC TRIGGER, UNSPECIFIED SEASONALITY: Primary | ICD-10-CM

## 2024-01-09 DIAGNOSIS — F33.1 MODERATE EPISODE OF RECURRENT MAJOR DEPRESSIVE DISORDER: ICD-10-CM

## 2024-01-09 PROCEDURE — 3075F SYST BP GE 130 - 139MM HG: CPT | Performed by: NURSE PRACTITIONER

## 2024-01-09 PROCEDURE — 1160F RVW MEDS BY RX/DR IN RCRD: CPT | Performed by: NURSE PRACTITIONER

## 2024-01-09 PROCEDURE — 90686 IIV4 VACC NO PRSV 0.5 ML IM: CPT | Performed by: NURSE PRACTITIONER

## 2024-01-09 PROCEDURE — 1159F MED LIST DOCD IN RCRD: CPT | Performed by: NURSE PRACTITIONER

## 2024-01-09 PROCEDURE — 3079F DIAST BP 80-89 MM HG: CPT | Performed by: NURSE PRACTITIONER

## 2024-01-09 PROCEDURE — 90471 IMMUNIZATION ADMIN: CPT | Performed by: NURSE PRACTITIONER

## 2024-01-09 PROCEDURE — 99214 OFFICE O/P EST MOD 30 MIN: CPT | Performed by: NURSE PRACTITIONER

## 2024-01-09 RX ORDER — CLOBETASOL PROPIONATE 0.5 MG/G
1 CREAM TOPICAL 2 TIMES DAILY
Qty: 60 G | Refills: 0 | Status: SHIPPED | OUTPATIENT
Start: 2024-01-09

## 2024-01-09 RX ORDER — FLUTICASONE PROPIONATE 50 MCG
2 SPRAY, SUSPENSION (ML) NASAL DAILY
Qty: 16 G | Refills: 5 | Status: SHIPPED | OUTPATIENT
Start: 2024-01-09

## 2024-01-09 RX ORDER — FEXOFENADINE HCL 180 MG/1
180 TABLET ORAL DAILY
Qty: 90 TABLET | Refills: 0 | Status: SHIPPED | OUTPATIENT
Start: 2024-01-09

## 2024-01-09 RX ORDER — ADHESIVE BANDAGE 3/4"
1 BANDAGE TOPICAL 2 TIMES DAILY
Qty: 1 EACH | Refills: 0 | Status: SHIPPED | OUTPATIENT
Start: 2024-01-09

## 2024-01-09 RX ORDER — VENLAFAXINE HYDROCHLORIDE 75 MG/1
75 CAPSULE, EXTENDED RELEASE ORAL DAILY
Qty: 90 CAPSULE | Refills: 1 | Status: SHIPPED | OUTPATIENT
Start: 2024-01-09

## 2024-01-09 NOTE — PROGRESS NOTES
Patient Name: Estefani Blancas  : 1965   MRN: [id number]     Chief Complaint:    Chief Complaint   Patient presents with    Diabetes     Follow up     Leg Pain     Left lower leg pain since having cellulitis     Joint Pain    Allergies    Eczema       History of Present Illness: Estefani Blancas is a 58 y.o. female presents to clinic for evaluation of multiple medical concerns.    The patient has been having runny nose, congestion, and sneezing for over a month. She was started on cetirizine. She takes Benadryl in the morning and at night, but it does not help. She has sneezing spells, which makes her sneeze 30 times at once. She has not tried Flonase. She had allergy testing done about 2.5 years ago and was informed that she was allergic to everything outside. She was not allergic to food or dust mites. She was placed on an allergy medication and she was supposed to begin injections at the [facility]. However, she did not do it because she switched jobs and they do not accept her medical card.    Her eczema is not flaring up. She previously used clobetasol ointment on her hands twice a day for 1 week, then used it for 3 weeks. She notes that steroid ointments help, but clobetasol works best for her.     She had cellulitis twice in the past, which resulted her in going to the emergency room. The last time she had it was months ago, and the time prior was last year. She constantly experiences a slight bit of pain, but in the past week, it has been throbbing with pain in left lower shin. It keeps her up at night. It is not tender to touch. She denies it feeling like a shin splint. She declines obtaining an x-ray. She prefers to monitor it. She does not believe she has a fracture, because she has not had an injuries.    She is taking Tylenol for her back pain, which is not improving. She can not stand up straight. She is doing physical therapy. She wears good shoes.    The patient has a wart on her  "thumb, which has been there for years. However, it has started to hurt. She has tried Compound W to pull it out, but she can not keep the Compound W on it. She prefers not to freeze it off at the moment.    Her blood pressure last week was 160/121 mmHg when she was at the audiologist. She has been out of her diltiazem for almost a week. The pharmacy advised her that it will be ready in 1 to 2 days. She notes her blood pressure has been \"okay\" as of right now. She is also on metoprolol. She is requesting a prescription for a blood pressure cuff. She has not been able to monitor her blood pressure because her cuff broke. She follows up with cardiology on 08/16/2024.    She does not sleep well due to her depression. She has an appointment with her psychiatrist tomorrow 01/10/2024. She expresses the Vraylar has been effective for her depression for the most part, but she is still occasionally experiencing crying spells. She is not taking it with any other medications. She discontinued taking the Effexor because she was not sure if she could take it with the Vraylar. She did not want to have a drug interaction.     The patient has been maintaining her diabetes well. She has been trying to keep it at a normal level. She has an appointment with her endocrinologist on Tuesday 01/16/2024. Her last A1c was 8.2 percent from 13 percent. She takes Ozempic 1 mg weekly and Novolog 70/30, 4 units twice a day. She states that her endocrinologist may increase it, because when she drinks zero sugar lemonade it elevates to 175 mg/dL.         Subjective     Review of System: Review of Systems   Constitutional:  Negative for fatigue and fever.   HENT:  Positive for postnasal drip, rhinorrhea and sneezing. Negative for congestion.    Respiratory:  Negative for shortness of breath and wheezing.    Cardiovascular:  Negative for chest pain and palpitations.   Gastrointestinal:  Negative for abdominal pain, diarrhea, nausea and vomiting. "   Genitourinary:  Negative for dysuria.   Musculoskeletal:  Positive for myalgias.   Skin:  Negative for rash.   Neurological:  Negative for headaches.   Hematological:  Negative for adenopathy.   Psychiatric/Behavioral:  Negative for dysphoric mood.         Medications:     Current Outpatient Medications:     acetaminophen (TYLENOL) 500 MG tablet, Take 2 tablets by mouth 3 (Three) Times a Day As Needed for Moderate Pain., Disp: 30 tablet, Rfl: 0    aspirin 81 MG EC tablet, Take 1 tablet by mouth Daily., Disp: , Rfl:     atorvastatin (LIPITOR) 80 MG tablet, Take 1 tablet by mouth once daily, Disp: 90 tablet, Rfl: 0    B-D ULTRAFINE III SHORT PEN 31G X 8 MM misc, USE 1 TWICE DAILY, Disp: , Rfl:     Bacillus Coagulans-Inulin (Probiotic) 1-250 BILLION-MG capsule, Take 1 capsule by mouth Daily., Disp: 30 capsule, Rfl: 0    Cariprazine HCl (Vraylar) 1.5 MG capsule capsule, Take 1 capsule by mouth Daily., Disp: 90 capsule, Rfl: 0    clopidogrel (PLAVIX) 75 MG tablet, Take 1 tablet by mouth Daily., Disp: 30 tablet, Rfl: 3    Continuous Blood Gluc  (Dexcom G6 ) device, USE AS DIRECTED, Disp: 1 each, Rfl: 0    Continuous Blood Gluc Sensor (Dexcom G6 Sensor), 1 each Take As Directed., Disp: 3 each, Rfl: 11    Continuous Blood Gluc Transmit (Dexcom G6 Transmitter) misc, 1 each Take As Directed., Disp: 1 each, Rfl: 3    Diclofenac Sodium (VOLTAREN) 1 % gel gel, Apply 4 g topically to the appropriate area as directed As Needed., Disp: , Rfl:     empagliflozin (Jardiance) 25 MG tablet tablet, Take 1 tablet by mouth Daily., Disp: 30 tablet, Rfl: 5    famotidine (PEPCID) 20 MG tablet, Take 1 tablet by mouth twice daily, Disp: 60 tablet, Rfl: 0    fenofibrate (TRICOR) 145 MG tablet, Take 1 tablet by mouth Daily., Disp: 90 tablet, Rfl: 1    furosemide (LASIX) 40 MG tablet, Take 1 tablet by mouth Daily., Disp: 30 tablet, Rfl: 2    glipizide (GLUCOTROL) 10 MG tablet, Take 1 tablet by mouth 2 (Two) Times a Day Before  "Meals., Disp: , Rfl:     insulin aspart prot & aspart (NovoLOG Mix 70/30 FlexPen) (70-30) 100 UNIT/ML suspension pen-injector injection, 40 units twice daily 15 minutes before meals, Disp: 90 mL, Rfl: 1    lactulose (CHRONULAC) 10 GM/15ML solution, Take 30 mL by mouth Daily., Disp: 946 mL, Rfl: 0    metoprolol tartrate (LOPRESSOR) 25 MG tablet, Take 1 tablet by mouth 2 (Two) Times a Day., Disp: , Rfl:     ondansetron ODT (ZOFRAN-ODT) 4 MG disintegrating tablet, Place 1 tablet on the tongue 4 (Four) Times a Day As Needed for Nausea or Vomiting., Disp: 15 tablet, Rfl: 0    Ozempic, 1 MG/DOSE, 4 MG/3ML solution pen-injector, INJECT 1 MG SUBCUTANEOUSLY  ONCE A WEEK, Disp: 3 mL, Rfl: 0    pantoprazole (PROTONIX) 40 MG EC tablet, Take 1 tablet by mouth once daily, Disp: 90 tablet, Rfl: 0    Blood Pressure Monitoring (Blood Pressure Cuff) misc, Use 1 Units 2 (Two) Times a Day., Disp: 1 each, Rfl: 0    clobetasol (TEMOVATE) 0.05 % cream, Apply 1 application  topically to the appropriate area as directed 2 (Two) Times a Day., Disp: 60 g, Rfl: 0    dilTIAZem CD (CARDIZEM CD) 120 MG 24 hr capsule, Take 1 capsule by mouth Daily., Disp: 30 capsule, Rfl: 3    fexofenadine (Allegra Allergy) 180 MG tablet, Take 1 tablet by mouth Daily., Disp: 90 tablet, Rfl: 0    fluticasone (FLONASE) 50 MCG/ACT nasal spray, 2 sprays into the nostril(s) as directed by provider Daily., Disp: 16 g, Rfl: 5    polyethylene glycol (MiraLax) 17 GM/SCOOP powder, Take 17 g by mouth Daily., Disp: 850 g, Rfl: 0    Allergies:   Allergies   Allergen Reactions    Benazepril-Hydrochlorothiazide Cough     Other reaction(s): Cough    Latex Hives       Objective     Physical Exam:   Vital Signs:   Vitals:    01/09/24 1500   BP: 130/88   BP Location: Right arm   Patient Position: Sitting   Cuff Size: Large Adult   Pulse: 96   Resp: 22   Temp: 97.7 °F (36.5 °C)   TempSrc: Infrared   Weight: 126 kg (278 lb 9.6 oz)   Height: 177.8 cm (70\")   PainSc:   4     Body mass " index is 39.97 kg/m².          Information has been added to the AVS.      Physical Exam  Constitutional:       Appearance: She is not ill-appearing.   HENT:      Nose: Congestion and rhinorrhea present.      Right Sinus: Maxillary sinus tenderness present. No frontal sinus tenderness.      Left Sinus: Maxillary sinus tenderness present. No frontal sinus tenderness.   Cardiovascular:      Rate and Rhythm: Normal rate and regular rhythm.      Heart sounds: No murmur heard.  Pulmonary:      Effort: No respiratory distress.      Breath sounds: No stridor. No wheezing, rhonchi or rales.   Abdominal:      General: Bowel sounds are normal.      Tenderness: There is no abdominal tenderness.   Lymphadenopathy:      Cervical: No cervical adenopathy.   Skin:     General: Skin is warm and dry.       Assessment / Plan      Assessment/Plan:   Diagnoses and all orders for this visit:    1. Allergic rhinitis due to other allergic trigger, unspecified seasonality (Primary)  -     fluticasone (FLONASE) 50 MCG/ACT nasal spray; 2 sprays into the nostril(s) as directed by provider Daily.  Dispense: 16 g; Refill: 5  -     Ambulatory Referral to Allergy  -     fexofenadine (Allegra Allergy) 180 MG tablet; Take 1 tablet by mouth Daily.  Dispense: 90 tablet; Refill: 0    2. Eczema, unspecified type  -     clobetasol (TEMOVATE) 0.05 % cream; Apply 1 application  topically to the appropriate area as directed 2 (Two) Times a Day.  Dispense: 60 g; Refill: 0    3. Left leg pain    4. Primary hypertension  -     Blood Pressure Monitoring (Blood Pressure Cuff) misc; Use 1 Units 2 (Two) Times a Day.  Dispense: 1 each; Refill: 0    5. Encounter for screening mammogram for breast cancer  -     Mammo Screening Digital Tomosynthesis Bilateral With CAD; Future    6. Encounter for immunization  -     Fluzone (or Fluarix & Flulaval for VFC) >6mos; Future  -     Fluzone (or Fluarix & Flulaval for VFC) >6mos    7. Moderate episode of recurrent major  depressive disorder  -     venlafaxine XR (Effexor XR) 75 MG 24 hr capsule; Take 1 capsule by mouth Daily.  Dispense: 90 capsule; Refill: 1    1. Rhinorrhea, congestion, and sneezing.  Flonase for a week consistently. If it does not improve, she was advised to contact the office and we will try add an additional nose spray. I will refer her to an allergist. I will stop the cetirizine. I will start her on Allegra.    2. Eczema.  I will send in a prescription for clobetasol. She will use it for 2 weeks when it flares up and then stop it.    3. Left lower leg pain.  She declines obtaining an x-ray. She will try stretching, ice or heat, and massage. She will continue to take Tylenol as needed.    4. Wart on thumb.  She will monitor.     5. Hypertension.  Her blood pressure is normal today. She will monitor her blood pressure at home. I will send in a prescription for a blood pressure cuff. If her blood pressure is not at goal of 130.80; she will let me know. Discussed adding another blood pressure medication. She was advised to contact the office if it continuously increases above 130/80 mmHg.    6. Depression.  Her GeneSight testing noted the Effexor and Vraylar will be effective. I will send in a prescription for Effexor 75 mg once a day. She was advised to take Vraylar with the Effexor.    7. Mammogram screening  An order was placed for a mammogram. She will contact scheduling if she does not get a call from scheduling.     Follow Up:   Return in about 6 months (around 6/27/2024) for Annual.    MARK Estrada  [facility]    Transcribed from ambient dictation for MARK Estrada by Kyleigh Sarabia.  01/09/24   17:17 EST    Patient or patient representative verbalized consent to the visit recording.  I have personally performed the services described in this document as transcribed by the above individual, and it is both accurate and complete.

## 2024-01-11 RX ORDER — FENOFIBRATE 145 MG/1
145 TABLET, COATED ORAL DAILY
Qty: 90 TABLET | Refills: 0 | Status: SHIPPED | OUTPATIENT
Start: 2024-01-11

## 2024-01-15 ENCOUNTER — TELEPHONE (OUTPATIENT)
Dept: INTERNAL MEDICINE | Facility: CLINIC | Age: 59
End: 2024-01-15
Payer: MEDICAID

## 2024-01-15 DIAGNOSIS — J30.89 ALLERGIC RHINITIS DUE TO OTHER ALLERGIC TRIGGER, UNSPECIFIED SEASONALITY: Primary | ICD-10-CM

## 2024-01-15 RX ORDER — METHYLPREDNISOLONE 4 MG/1
TABLET ORAL
Qty: 21 TABLET | Refills: 0 | Status: SHIPPED | OUTPATIENT
Start: 2024-01-15

## 2024-01-15 NOTE — TELEPHONE ENCOUNTER
Continue flonase for one week. It can take a week before she gets the full effect. If no improvement by Thursday or Friday she can start the steroids. I will send them to her pharmacy. This will increase her glucose. She will need to monitor it closely.

## 2024-01-15 NOTE — TELEPHONE ENCOUNTER
Patient has been notified and demonstrates understanding and appreciation. She will closely monitor glucose. No further questions at this time.

## 2024-01-15 NOTE — TELEPHONE ENCOUNTER
Patient states you told her to call you back and let you know how she was doing on her new medications. She started nasal spray and states it works for about 2 hours and then stops working. She also started allegra and hasn't seen any change with that. She said she is still having all the facial pain, congestion, head fullness ect.   Patient said you had mentioned possibly starting steroids to help clear her up, but you wanted to wait and try those other medications first because of her diabetes.   She said she is willing to try anything at this point. Please advise

## 2024-01-18 DIAGNOSIS — Z79.4 TYPE 2 DIABETES MELLITUS WITH HYPERGLYCEMIA, WITH LONG-TERM CURRENT USE OF INSULIN: ICD-10-CM

## 2024-01-18 DIAGNOSIS — E11.65 TYPE 2 DIABETES MELLITUS WITH HYPERGLYCEMIA, WITH LONG-TERM CURRENT USE OF INSULIN: ICD-10-CM

## 2024-01-18 RX ORDER — SEMAGLUTIDE 1.34 MG/ML
INJECTION, SOLUTION SUBCUTANEOUS
Qty: 3 ML | Refills: 0 | Status: SHIPPED | OUTPATIENT
Start: 2024-01-18

## 2024-01-18 NOTE — TELEPHONE ENCOUNTER
Rx Refill Note  Requested Prescriptions     Pending Prescriptions Disp Refills    Ozempic, 1 MG/DOSE, 4 MG/3ML solution pen-injector [Pharmacy Med Name: Ozempic (1 MG/DOSE) 4 MG/3ML Subcutaneous Solution Pen-injector] 3 mL 0     Sig: INJECT 1 MG SUBCUTANEOUSLY ONCE A WEEK      Last office visit with prescribing clinician: 8/15/2023     Next office visit with prescribing clinician: 1/22/2024       Kalin Green MA  01/18/24, 09:50 EST

## 2024-01-21 DIAGNOSIS — R12 HEARTBURN: ICD-10-CM

## 2024-01-22 RX ORDER — FAMOTIDINE 20 MG/1
20 TABLET, FILM COATED ORAL 2 TIMES DAILY
Qty: 60 TABLET | Refills: 0 | Status: SHIPPED | OUTPATIENT
Start: 2024-01-22

## 2024-02-11 DIAGNOSIS — Z79.4 TYPE 2 DIABETES MELLITUS WITH HYPERGLYCEMIA, WITH LONG-TERM CURRENT USE OF INSULIN: ICD-10-CM

## 2024-02-11 DIAGNOSIS — E11.65 TYPE 2 DIABETES MELLITUS WITH HYPERGLYCEMIA, WITH LONG-TERM CURRENT USE OF INSULIN: ICD-10-CM

## 2024-02-12 RX ORDER — EMPAGLIFLOZIN 25 MG/1
25 TABLET, FILM COATED ORAL DAILY
Qty: 30 TABLET | Refills: 3 | Status: SHIPPED | OUTPATIENT
Start: 2024-02-12

## 2024-02-17 DIAGNOSIS — R12 HEARTBURN: ICD-10-CM

## 2024-02-19 RX ORDER — FAMOTIDINE 20 MG/1
20 TABLET, FILM COATED ORAL 2 TIMES DAILY
Qty: 60 TABLET | Refills: 0 | Status: SHIPPED | OUTPATIENT
Start: 2024-02-19

## 2024-03-03 DIAGNOSIS — E11.65 TYPE 2 DIABETES MELLITUS WITH HYPERGLYCEMIA, WITH LONG-TERM CURRENT USE OF INSULIN: ICD-10-CM

## 2024-03-03 DIAGNOSIS — Z79.4 TYPE 2 DIABETES MELLITUS WITH HYPERGLYCEMIA, WITH LONG-TERM CURRENT USE OF INSULIN: ICD-10-CM

## 2024-03-04 RX ORDER — SEMAGLUTIDE 1.34 MG/ML
INJECTION, SOLUTION SUBCUTANEOUS
Qty: 3 ML | Refills: 2 | Status: SHIPPED | OUTPATIENT
Start: 2024-03-04

## 2024-03-04 NOTE — TELEPHONE ENCOUNTER
Rx Refill Note  Requested Prescriptions     Pending Prescriptions Disp Refills    Ozempic, 1 MG/DOSE, 4 MG/3ML solution pen-injector [Pharmacy Med Name: Ozempic (1 MG/DOSE) 4 MG/3ML Subcutaneous Solution Pen-injector] 3 mL 0     Sig: INJECT 1 MG SUBCUTANEOUSLY  ONCE A WEEK      Last office visit with prescribing clinician: 8/15/2023   Last telemedicine visit with prescribing clinician: Visit date not found   Next office visit with prescribing clinician: 5/23/2024                         Would you like a call back once the refill request has been completed: [] Yes [] No    If the office needs to give you a call back, can they leave a voicemail: [] Yes [] No    China Valencia MA  03/04/24, 08:25 EST

## 2024-03-11 DIAGNOSIS — F33.1 MODERATE EPISODE OF RECURRENT MAJOR DEPRESSIVE DISORDER: ICD-10-CM

## 2024-03-11 RX ORDER — CARIPRAZINE 1.5 MG/1
1.5 CAPSULE, GELATIN COATED ORAL DAILY
Qty: 90 CAPSULE | Refills: 0 | Status: SHIPPED | OUTPATIENT
Start: 2024-03-11

## 2024-03-14 RX ORDER — DILTIAZEM HYDROCHLORIDE 120 MG/1
120 CAPSULE, COATED, EXTENDED RELEASE ORAL DAILY
Qty: 30 CAPSULE | Refills: 3 | Status: SHIPPED | OUTPATIENT
Start: 2024-03-14

## 2024-03-14 NOTE — TELEPHONE ENCOUNTER
Caller: [id number] - [place], [place] - [address] AT [facility] [address] [facility] [phone] [place] [phone] FX    Relationship: Pharmacy      Requested Prescriptions:   Requested Prescriptions     Pending Prescriptions Disp Refills    dilTIAZem CD (CARDIZEM CD) 120 MG 24 hr capsule 30 capsule 3     Sig: Take 1 capsule by mouth Daily.        Pharmacy where request should be sent: [id number] - [place], [place] - [address] AT [facility] [address] [facility] [phone] [place] [phone] FX     Last office visit with prescribing clinician: 1/9/2024   Last telemedicine visit with prescribing clinician: Visit date not found   Next office visit with prescribing clinician: 7/9/2024     Additional details provided by patient:     Does the patient have less than a 3 day supply:  [x] Yes  [] No    Would you like a call back once the refill request has been completed: [] Yes [x] No    If the office needs to give you a call back, can they leave a voicemail: [] Yes [x] No    Yue Dacosta Rep   03/14/24 09:50 EDT

## 2024-03-17 DIAGNOSIS — R12 HEARTBURN: ICD-10-CM

## 2024-03-18 RX ORDER — FAMOTIDINE 20 MG/1
20 TABLET, FILM COATED ORAL 2 TIMES DAILY
Qty: 60 TABLET | Refills: 0 | Status: SHIPPED | OUTPATIENT
Start: 2024-03-18

## 2024-03-25 RX ORDER — PEN NEEDLE, DIABETIC 31 GX5/16"
NEEDLE, DISPOSABLE MISCELLANEOUS
Qty: 200 EACH | Refills: 0 | Status: SHIPPED | OUTPATIENT
Start: 2024-03-25

## 2024-03-25 RX ORDER — GLIPIZIDE 10 MG/1
10 TABLET ORAL
Qty: 180 TABLET | Refills: 0 | Status: SHIPPED | OUTPATIENT
Start: 2024-03-25

## 2024-03-25 NOTE — TELEPHONE ENCOUNTER
PT CALLED REQUESTING GLIPIZIDE AND BD ULTRA FINE PEN NEEDLES TO BE SENT IN TO [facility] IN [place], [place].

## 2024-03-26 ENCOUNTER — HOSPITAL ENCOUNTER (OUTPATIENT)
Dept: MAMMOGRAPHY | Facility: HOSPITAL | Age: 59
Discharge: HOME | End: 2024-03-26
Admitting: NURSE PRACTITIONER
Payer: MEDICAID

## 2024-03-26 DIAGNOSIS — Z12.31 ENCOUNTER FOR SCREENING MAMMOGRAM FOR BREAST CANCER: ICD-10-CM

## 2024-03-26 PROCEDURE — 77063 BREAST TOMOSYNTHESIS BI: CPT

## 2024-03-26 PROCEDURE — 77067 SCR MAMMO BI INCL CAD: CPT

## 2024-04-08 RX ORDER — FENOFIBRATE 145 MG/1
145 TABLET, COATED ORAL DAILY
Qty: 90 TABLET | Refills: 0 | Status: SHIPPED | OUTPATIENT
Start: 2024-04-08

## 2024-04-10 ENCOUNTER — PRIOR AUTHORIZATION (OUTPATIENT)
Dept: ENDOCRINOLOGY | Facility: CLINIC | Age: 59
End: 2024-04-10
Payer: MEDICAID

## 2024-04-11 NOTE — TELEPHONE ENCOUNTER
Estefani Blancas (Key: [id number])  PA Case ID #: [id number]  Rx #: [id number]  Need Help? Call us at [phone]  Outcome  Approved on April 10  The request has been approved. The authorization is effective from 04/10/2024 to 04/10/2025, as long as the member is enrolled in their current health plan. A written notification letter will follow with additional details.  Authorization Expiration Date: 4/9/2025  Drug  Dexcom G6 Sensor  ePA cloud logo  Form  [employer] [place] Medicaid ePA Form 2017 [place]CP[place]

## 2024-04-15 RX ORDER — FENOFIBRATE 145 MG/1
145 TABLET, COATED ORAL DAILY
Qty: 90 TABLET | Refills: 0 | OUTPATIENT
Start: 2024-04-15

## 2024-04-15 RX ORDER — ATORVASTATIN CALCIUM 80 MG/1
80 TABLET, FILM COATED ORAL DAILY
Qty: 90 TABLET | Refills: 0 | OUTPATIENT
Start: 2024-04-15

## 2024-04-18 DIAGNOSIS — R12 HEARTBURN: ICD-10-CM

## 2024-04-18 RX ORDER — FAMOTIDINE 20 MG/1
20 TABLET, FILM COATED ORAL 2 TIMES DAILY
Qty: 60 TABLET | Refills: 0 | Status: SHIPPED | OUTPATIENT
Start: 2024-04-18

## 2024-04-30 DIAGNOSIS — J30.89 ALLERGIC RHINITIS DUE TO OTHER ALLERGIC TRIGGER, UNSPECIFIED SEASONALITY: ICD-10-CM

## 2024-04-30 RX ORDER — CETIRIZINE HYDROCHLORIDE 10 MG/1
10 TABLET, FILM COATED ORAL DAILY
Qty: 90 TABLET | Refills: 0 | Status: SHIPPED | OUTPATIENT
Start: 2024-04-30

## 2024-05-23 ENCOUNTER — OFFICE VISIT (OUTPATIENT)
Dept: ENDOCRINOLOGY | Facility: CLINIC | Age: 59
End: 2024-05-23
Payer: MEDICAID

## 2024-05-23 VITALS
BODY MASS INDEX: 38.94 KG/M2 | WEIGHT: 272 LBS | DIASTOLIC BLOOD PRESSURE: 70 MMHG | HEART RATE: 62 BPM | OXYGEN SATURATION: 99 % | SYSTOLIC BLOOD PRESSURE: 120 MMHG | HEIGHT: 70 IN

## 2024-05-23 DIAGNOSIS — E11.65 TYPE 2 DIABETES MELLITUS WITH HYPERGLYCEMIA, WITH LONG-TERM CURRENT USE OF INSULIN: Primary | ICD-10-CM

## 2024-05-23 DIAGNOSIS — Z79.4 TYPE 2 DIABETES MELLITUS WITH HYPERGLYCEMIA, WITH LONG-TERM CURRENT USE OF INSULIN: Primary | ICD-10-CM

## 2024-05-23 LAB
EXPIRATION DATE: ABNORMAL
EXPIRATION DATE: NORMAL
GLUCOSE BLDC GLUCOMTR-MCNC: 80 MG/DL (ref 70–130)
HBA1C MFR BLD: 6.1 % (ref 4.5–5.7)
Lab: ABNORMAL
Lab: NORMAL

## 2024-05-23 RX ORDER — GLIPIZIDE 5 MG/1
10 TABLET ORAL
Qty: 120 TABLET | Refills: 5 | Status: SHIPPED | OUTPATIENT
Start: 2024-05-23

## 2024-05-23 RX ORDER — INSULIN ASPART 100 [IU]/ML
INJECTION, SUSPENSION SUBCUTANEOUS
Qty: 90 ML | Refills: 1 | Status: SHIPPED | OUTPATIENT
Start: 2024-05-23 | End: 2024-05-23

## 2024-05-23 RX ORDER — SEMAGLUTIDE 2.68 MG/ML
2 INJECTION, SOLUTION SUBCUTANEOUS
Qty: 9 ML | Refills: 1 | Status: SHIPPED | OUTPATIENT
Start: 2024-05-23

## 2024-05-23 NOTE — PROGRESS NOTES
"Chief Complaint   Patient presents with    Diabetes     Type II          HPI   Estefanitim Blancas is a 58 y.o. female had concerns including Diabetes (Type II).    She is checking blood sugars 4+ times per day with CGM. Data reviewed from the last two weeks shows average glucose 151 with SD of 42. In target range 76%, high 22%, very high 1%, low <1%, very low <1%.   Pattern of: occasional hypoglycemia, postprandial hyperglycemia    Current medications for diabetes include Ozempic 1 mg weekly, Jardiance 25 mg daily, glipizide 10 mg twice daily, mixed insulin 4 units twice daily.     She was prescribed 40 units twice daily but tells me she dials up to 4.   Due to frequent hypoglycemia she has started snack and drink soda. Glucose in the office in the 80s.  She snacked before she came to avoid hypoglycemia while driving and her glucose was up over 200, but within the last hour has dropped to 80.     The following portions of the patient's history were reviewed and updated as appropriate: allergies, current medications, past family history, past medical history, past social history, past surgical history, and problem list.      Review of Systems   Constitutional:  Positive for appetite change.   Endocrine:        See HPI        Physical Exam  Cardiovascular:      Pulses:           Dorsalis pedis pulses are 1+ on the right side and 1+ on the left side.   Musculoskeletal:      Right foot: Deformity (hammer toe) present.      Left foot: Deformity (hammer toe) present.   Feet:      Right foot:      Skin integrity: Callus present.      Toenail Condition: Right toenails are abnormally thick.      Left foot:      Skin integrity: Callus present.      Toenail Condition: Left toenails are abnormally thick.      Comments: Diabetic Foot Exam Performed and Monofilament Test Performed    Monofilament 0/5 bilaterally         /70 (BP Location: Left arm, Patient Position: Sitting, Cuff Size: Adult)   Pulse 62   Ht 177.8 cm (70\")  "  Wt 123 kg (272 lb)   SpO2 99%   BMI 39.03 kg/m²      Labs and imaging    CMP:  Lab Results   Component Value Date    BUN 19 07/31/2023    CREATININE 0.87 07/31/2023    EGFR 78.8 07/31/2023    BCR 21.8 07/31/2023     07/31/2023    K 3.8 07/31/2023    CO2 16.0 (L) 07/31/2023    CALCIUM 9.1 07/31/2023    ALBUMIN 4.1 07/31/2023    BILITOT 0.5 07/31/2023    ALKPHOS 80 07/31/2023    AST 16 07/31/2023    ALT 16 07/31/2023     Lipid Panel:  Lab Results   Component Value Date    CHOL 154 05/03/2023    TRIG 258 (H) 05/03/2023    HDL 34 (L) 05/03/2023    VLDL 42 (H) 05/03/2023    LDL 78 05/03/2023     HbA1c:  Lab Results   Component Value Date    HGBA1C 6.1 (A) 05/23/2024    HGBA1C 8.2 08/15/2023    HGBA1C 9.60 (H) 05/03/2023     Glucose:  Lab Results   Component Value Date    POCGLU 80 05/23/2024     TSH:  Lab Results   Component Value Date    TSH 1.020 03/06/2023       Assessment and plan  Diagnoses and all orders for this visit:    1. Type 2 diabetes mellitus with hyperglycemia, with long-term current use of insulin (Primary)  Uncontrolled with postprandial hyperglycemia and occasional hypoglycemia.  Complicated by neuropathy.  A1c is much improved to 6.1.  She was taken off metformin during CABG.  Consider resuming in the future.  DC insulin.  She is currently taking 4 units twice daily and snacking to avoid hypoglycemia.  Change glipizide to 5 mg and take 10 mg twice daily.  Titrate down on dose as needed.  Decrease snacking eliminate regular sweetened beverages.  Continue Ozempic but increase to 2 mg weekly.    Continue Jardiance 25 mg daily.  Continue CGM.  Labs will be updated soon.  Orders were entered.  Monofilament updated today.  Ophtho exam updated 10/25/2023 with no retinopathy.  -     POC Glucose, Blood  -     POC Glycosylated Hemoglobin (Hb A1C)  -     Discontinue: insulin aspart prot & aspart (NovoLOG Mix 70/30 FlexPen) (70-30) 100 UNIT/ML suspension pen-injector injection; 36 units twice daily 15  minutes before meals  Dispense: 90 mL; Refill: 1  -     Semaglutide, 2 MG/DOSE, (Ozempic, 2 MG/DOSE,) 8 MG/3ML solution pen-injector; Inject 2 mg under the skin into the appropriate area as directed Every 7 (Seven) Days.  Dispense: 9 mL; Refill: 1  -     CBC (No Diff); Future  -     Comprehensive Metabolic Panel; Future  -     Lipid Panel; Future  -     Microalbumin / Creatinine Urine Ratio - Urine, Clean Catch; Future  -     TSH; Future  -     glipizide (GLUCOTROL) 5 MG tablet; Take 2 tablets by mouth 2 (Two) Times a Day Before Meals.  Dispense: 120 tablet; Refill: 5         Return in about 4 months (around 9/23/2024) for next scheduled follow up. The patient was instructed to contact the clinic with any interval questions or concerns.    Electronically signed by: Salena Chamberlain DO   Endocrinologist    Please note that portions of this note were completed with a voice recognition program.

## 2024-05-25 DIAGNOSIS — Z79.4 TYPE 2 DIABETES MELLITUS WITH HYPERGLYCEMIA, WITH LONG-TERM CURRENT USE OF INSULIN: ICD-10-CM

## 2024-05-25 DIAGNOSIS — E11.65 TYPE 2 DIABETES MELLITUS WITH HYPERGLYCEMIA, WITH LONG-TERM CURRENT USE OF INSULIN: ICD-10-CM

## 2024-05-28 RX ORDER — SEMAGLUTIDE 1.34 MG/ML
INJECTION, SOLUTION SUBCUTANEOUS
Qty: 3 ML | Refills: 0 | OUTPATIENT
Start: 2024-05-28

## 2024-06-01 DIAGNOSIS — Z79.4 TYPE 2 DIABETES MELLITUS WITH HYPERGLYCEMIA, WITH LONG-TERM CURRENT USE OF INSULIN: ICD-10-CM

## 2024-06-01 DIAGNOSIS — E11.65 TYPE 2 DIABETES MELLITUS WITH HYPERGLYCEMIA, WITH LONG-TERM CURRENT USE OF INSULIN: ICD-10-CM

## 2024-06-03 RX ORDER — EMPAGLIFLOZIN 25 MG/1
25 TABLET, FILM COATED ORAL DAILY
Qty: 90 TABLET | Refills: 1 | Status: SHIPPED | OUTPATIENT
Start: 2024-06-03

## 2024-06-03 NOTE — TELEPHONE ENCOUNTER
Rx Refill Note  Requested Prescriptions     Pending Prescriptions Disp Refills    Jardiance 25 MG tablet tablet [Pharmacy Med Name: Jardiance 25 MG Oral Tablet] 30 tablet 0     Sig: Take 1 tablet by mouth once daily      Last office visit with prescribing clinician: 5/23/2024     Next office visit with prescribing clinician: 11/21/2024                           Elisa Velasco MA  06/03/24, 15:03 EDT

## 2024-06-08 DIAGNOSIS — F33.1 MODERATE EPISODE OF RECURRENT MAJOR DEPRESSIVE DISORDER: ICD-10-CM

## 2024-06-10 RX ORDER — CARIPRAZINE 1.5 MG/1
1.5 CAPSULE, GELATIN COATED ORAL DAILY
Qty: 90 CAPSULE | Refills: 0 | Status: SHIPPED | OUTPATIENT
Start: 2024-06-10

## 2024-07-01 RX ORDER — FENOFIBRATE 145 MG/1
145 TABLET, COATED ORAL DAILY
Qty: 90 TABLET | Refills: 0 | Status: SHIPPED | OUTPATIENT
Start: 2024-07-01

## 2024-07-05 DIAGNOSIS — F33.1 MODERATE EPISODE OF RECURRENT MAJOR DEPRESSIVE DISORDER: ICD-10-CM

## 2024-07-05 NOTE — TELEPHONE ENCOUNTER
LOV 01/09/2024  NOV     Tried to reach patient no answer left voicemail to return call    RELAY:  We recently received your message to refill your medication(s).  Our records indicate that you did not schedule a follow up appointment with your provider at your last visit on 01/09/2024. The medication that you are on requires a follow up appointment for additional refills. Patient was to schedule on or around 06/27/2024 for 6 month follow up    If she is unable to keep her appointment we will not be able to provider further refills.  Once appointment has been scheduled please update message with date and time so we can process the request.  We will forward the message to the provider to review the refill request.

## 2024-07-08 NOTE — TELEPHONE ENCOUNTER
2nd attempt     LOV 01/09/2024  NOV      Tried to reach patient no answer left voicemail to return call     RELAY:  We recently received your message to refill your medication(s).  Our records indicate that you did not schedule a follow up appointment with your provider at your last visit on 01/09/2024. The medication that you are on requires a follow up appointment for additional refills. Patient was to schedule on or around 06/27/2024 for 6 month follow up     If she is unable to keep her appointment we will not be able to provider further refills.  Once appointment has been scheduled please update message with date and time so we can process the request.  We will forward the message to the provider to review the refill request.

## 2024-07-09 ENCOUNTER — PRIOR AUTHORIZATION (OUTPATIENT)
Dept: ENDOCRINOLOGY | Facility: CLINIC | Age: 59
End: 2024-07-09
Payer: MEDICAID

## 2024-07-09 RX ORDER — DILTIAZEM HYDROCHLORIDE 120 MG/1
120 CAPSULE, COATED, EXTENDED RELEASE ORAL DAILY
Qty: 30 CAPSULE | Refills: 2 | Status: SHIPPED | OUTPATIENT
Start: 2024-07-09

## 2024-07-09 NOTE — TELEPHONE ENCOUNTER
Estefani Blancas (Key: [id number])  PA Case ID #: [id number]  Rx #: [id number]  Need Help? Call us at [phone]  Outcome  Approved today  The request has been approved. The authorization is effective from 07/09/2024 to 07/09/2025, as long as the member is enrolled in their current health plan. A written notification letter will follow with additional details.  Authorization Expiration Date: 7/8/2025  Drug  Dexcom G6 Transmitter  ePA cloud logo  Form  [employer] [place] Medicaid ePA Form 2017 NCPDP

## 2024-07-10 RX ORDER — VENLAFAXINE HYDROCHLORIDE 75 MG/1
75 CAPSULE, EXTENDED RELEASE ORAL DAILY
Qty: 90 CAPSULE | Refills: 0 | Status: SHIPPED | OUTPATIENT
Start: 2024-07-10

## 2024-07-22 RX ORDER — CLOPIDOGREL BISULFATE 75 MG/1
75 TABLET ORAL DAILY
Qty: 90 TABLET | Refills: 0 | Status: SHIPPED | OUTPATIENT
Start: 2024-07-22

## 2024-07-22 NOTE — TELEPHONE ENCOUNTER
LOV 01/09/2024  NOV Not scheduled yet        Follow Up:   Return in about 6 months (around 6/27/2024) for Annual.    We recently received your message to refill your medication(s).  Our records indicate that you did not schedule a follow up appointment with your provider at your last visit on 01/09/2024. The medication that you are on requires a follow up appointment for additional refills. Patient was to schedule on or around 06/27/2024 for Annual

## 2024-07-25 DIAGNOSIS — E11.65 TYPE 2 DIABETES MELLITUS WITH HYPERGLYCEMIA, WITH LONG-TERM CURRENT USE OF INSULIN: ICD-10-CM

## 2024-07-25 DIAGNOSIS — Z79.4 TYPE 2 DIABETES MELLITUS WITH HYPERGLYCEMIA, WITH LONG-TERM CURRENT USE OF INSULIN: ICD-10-CM

## 2024-07-25 RX ORDER — PROCHLORPERAZINE 25 MG/1
SUPPOSITORY RECTAL SEE ADMIN INSTRUCTIONS
Qty: 3 EACH | Refills: 3 | Status: SHIPPED | OUTPATIENT
Start: 2024-07-25

## 2024-07-25 NOTE — TELEPHONE ENCOUNTER
Rx Refill Note  Requested Prescriptions     Pending Prescriptions Disp Refills    Continuous Glucose Sensor (Dexcom G6 Sensor) [Pharmacy Med Name: DEXCOM G6 SENSOR    MIS] 3 each 3     Sig: APPLY  AS DIRECTED      Last office visit with prescribing clinician: 5/23/2024     Next office visit with prescribing clinician: 11/21/2024       Guille Watt MA  07/25/24, 08:19 EDT

## 2024-08-15 ENCOUNTER — OFFICE VISIT (OUTPATIENT)
Dept: CARDIOLOGY | Facility: CLINIC | Age: 59
End: 2024-08-15
Payer: MEDICAID

## 2024-08-15 VITALS
OXYGEN SATURATION: 97 % | DIASTOLIC BLOOD PRESSURE: 72 MMHG | WEIGHT: 282 LBS | HEART RATE: 57 BPM | SYSTOLIC BLOOD PRESSURE: 116 MMHG | BODY MASS INDEX: 40.37 KG/M2 | HEIGHT: 70 IN

## 2024-08-15 DIAGNOSIS — I10 PRIMARY HYPERTENSION: ICD-10-CM

## 2024-08-15 DIAGNOSIS — E78.5 HYPERLIPIDEMIA LDL GOAL <70: ICD-10-CM

## 2024-08-15 DIAGNOSIS — I25.118 CORONARY ARTERY DISEASE OF NATIVE ARTERY OF NATIVE HEART WITH STABLE ANGINA PECTORIS: Primary | ICD-10-CM

## 2024-08-15 PROCEDURE — 3078F DIAST BP <80 MM HG: CPT | Performed by: INTERNAL MEDICINE

## 2024-08-15 PROCEDURE — 99214 OFFICE O/P EST MOD 30 MIN: CPT | Performed by: INTERNAL MEDICINE

## 2024-08-15 PROCEDURE — 93000 ELECTROCARDIOGRAM COMPLETE: CPT | Performed by: INTERNAL MEDICINE

## 2024-08-15 PROCEDURE — 3074F SYST BP LT 130 MM HG: CPT | Performed by: INTERNAL MEDICINE

## 2024-08-15 RX ORDER — ATORVASTATIN CALCIUM 80 MG/1
80 TABLET, FILM COATED ORAL DAILY
Qty: 90 TABLET | Refills: 3 | Status: SHIPPED | OUTPATIENT
Start: 2024-08-15

## 2024-08-15 RX ORDER — DILTIAZEM HYDROCHLORIDE 120 MG/1
120 CAPSULE, EXTENDED RELEASE ORAL
COMMUNITY
Start: 2024-06-07 | End: 2024-08-15

## 2024-08-15 NOTE — PROGRESS NOTES
OFFICE VISIT  NOTE  [facility] CARDIOLOGY      Name: Estefani Blancas    Date: 8/15/2024  MRN:  [id number]  :  1965      REFERRING/PRIMARY PROVIDER:  Leeanna Tovar APRN     Chief Complaint   Patient presents with    Coronary artery disease of native artery of native heart wi     Coronary artery disease of native artery of native heart with stable angina pectoris    Palpitations    Dizziness    Shortness of Breath     HPI: Estefani Blancas is a 58 y.o. female who presents today for cAD s/p CABG . history of HTN, HLD, diabetes and obesity. She had CABG x2 at [facility] in 2023. Presented with worsening chest pain 2023, EKG on arrival concerning for subtle ESTEVAN in inferior leads, and she was taken for urgent cath. This showed 80% hazy OM2 stenosis, s/p ERNESTO by Dr. Rene, patent GUTIERREZ to LAD and patent free radial to diseased small diagonal branch. 50% mid RCA with normal RFR. Echo showed normal EF, small <1cm pericardial effusion.  She had post pericardiotomy syndrome for 3 months postsurgery but resolved.  Overall doing quite well, walks a lot at work with no chest pain or shortness of breath.  Does feel dizzy and has been worsening over last month.  He has been on metoprolol for 3 months.  Still taking diltiazem aspirin and atorvastatin.    ROS:Pertinent positives as listed in the HPI.  All other systems reviewed and negative.    Past Medical History:   Diagnosis Date    Abnormal ECG May 2 2023    Asthma 2023    CHF (congestive heart failure) May 4 2023    Congenital heart disease 2023    Coronary artery disease 2023    Diabetes mellitus     Heart attack 2023    Hyperlipidemia     Hypertension     Right middle lobe pulmonary nodule 2023    3/2/23: CT of chest FINDINGS: There is an 8 mm nodule in the right middle lobe. There is a 5  mm subpleural nodule in the left lower lobe. Mild diffuse interstitial  changes are present. The ascending aorta is  ectatic. There is no  pericardial or pleural effusion. There is no adenopathy. There is no  dissection. There is no PE. The spleen is mildly enlarged at 15 cm. -Referral to lung nodule clini    Sleep apnea 2017    Type 2 diabetes mellitus 2016       Past Surgical History:   Procedure Laterality Date    ADENOIDECTOMY      ARTERIAL BYPASS SURGERY      CARDIAC CATHETERIZATION N/A 2023    Procedure: Left Heart Cath;  Surgeon: Korey Rene MD;  Location: [facility] CATH INVASIVE LOCATION;  Service: Cardiovascular;  Laterality: N/A;    CARDIAC SURGERY  2023     SECTION      CORONARY ARTERY BYPASS GRAFT  2023    CORONARY STENT PLACEMENT  May 2 2023    TONSILLECTOMY         Social History     Socioeconomic History    Marital status:    Tobacco Use    Smoking status: Never    Smokeless tobacco: Never   Vaping Use    Vaping status: Never Used   Substance and Sexual Activity    Alcohol use: Never    Drug use: Never    Sexual activity: Not Currently     Partners: Male     Birth control/protection: Post-menopausal       Family History   Problem Relation Age of Onset    Heart attack Mother     Asthma Mother     Heart attack Father     Heart disease Father     Hypertension Father     Diabetes Father     Obesity Father     Breast cancer Sister     Asthma Sister     Cancer Sister     Kidney disease Sister     Heart attack Maternal Grandmother     Heart attack Paternal Grandmother     Lung cancer Paternal Aunt     Heart attack Paternal Grandfather     Ovarian cancer Neg Hx         Allergies   Allergen Reactions    Lisinopril Unknown - High Severity    Benazepril-Hydrochlorothiazide Cough     Other reaction(s): Cough    Latex Hives       Current Outpatient Medications   Medication Instructions    acetaminophen (TYLENOL) 1,000 mg, Oral, 3 Times Daily PRN    aspirin 81 mg, Oral, Daily    atorvastatin (LIPITOR) 80 mg, Oral, Daily    B-D ULTRAFINE III SHORT PEN 31G X 8 MM misc Use to inject  "insulin twice daily    Bacillus Coagulans-Inulin (Probiotic) 1-250 BILLION-MG capsule 1 capsule, Oral, Daily    Blood Pressure Monitoring (Blood Pressure Cuff) misc 1 Units, Does not apply, 2 Times Daily    clobetasol (TEMOVATE) 0.05 % cream 1 application , Topical, 2 Times Daily    Continuous Blood Gluc  (Dexcom G6 ) device See Admin Instructions    Continuous Blood Gluc Transmit (Dexcom G6 Transmitter) misc 1 each, Does not apply, Take As Directed    Continuous Glucose Sensor (Dexcom G6 Sensor) See Admin Instructions    Diclofenac Sodium (VOLTAREN) 4 g, Topical, As Needed, PRN ONLY    dilTIAZem CD (CARDIZEM CD) 120 mg, Oral, Daily    famotidine (PEPCID) 20 mg, Oral, 2 Times Daily    fenofibrate (TRICOR) 145 mg, Oral, Daily    fexofenadine (ALLEGRA ALLERGY) 180 mg, Oral, Daily    fluticasone (FLONASE) 50 MCG/ACT nasal spray 2 sprays, Nasal, Daily    furosemide (LASIX) 40 mg, Oral, Daily    glipizide (GLUCOTROL) 10 mg, Oral, 2 Times Daily Before Meals    Jardiance 25 mg, Oral, Daily    Ozempic (2 MG/DOSE) 2 mg, Subcutaneous, Every 7 Days    venlafaxine XR (EFFEXOR-XR) 75 mg, Oral, Daily    Vraylar 1.5 mg, Oral, Daily       Vitals:    08/15/24 1445   BP: 116/72   BP Location: Right arm   Patient Position: Sitting   Pulse: 57   SpO2: 97%   Weight: 128 kg (282 lb)   Height: 177.8 cm (70\")     Body mass index is 40.46 kg/m².    PHYSICAL EXAM:    General Appearance:   well developed  well nourished  Neck:  thyroid not enlarged  supple  Respiratory:  no respiratory distress  normal breath sounds  no rales  Cardiovascular:  no jugular venous distention  regular rhythm  apical impulse normal  S1 normal, S2 normal  no S3, no S4   no murmur  no rub, no thrill  lower extremity edema: none    Skin:   warm, dry    RESULTS:     ECG 12 Lead    Date/Time: 8/15/2024 3:07 PM  Performed by: Saeed Temple MD    Authorized by: Saeed Temple MD  Comparison: compared with previous ECG from 8/4/2023  Similar to " "previous ECG  Rhythm: sinus bradycardia  Rate: bradycardic  BPM: 57  QRS axis: normal    Clinical impression: non-specific ECG          Results for orders placed during the hospital encounter of 05/02/23    Adult Transthoracic Echo Complete w/ Color, Spectral and Contrast if Necessary Per Protocol    Interpretation Summary    Left ventricular systolic function is normal. Calculated left ventricular EF = 57.2%    Left ventricular diastolic function was normal.    Estimated right ventricular systolic pressure from tricuspid regurgitation is normal (<35 mmHg).    There is a small (<1cm) circumferential pericardial effusion.  Labs:  Lab Results   Component Value Date    CHOL 154 05/03/2023    TRIG 258 (H) 05/03/2023    HDL 34 (L) 05/03/2023    LDL 78 05/03/2023    AST 16 07/31/2023    ALT 16 07/31/2023     Lab Results   Component Value Date    HGBA1C 6.1 (A) 05/23/2024     Creatinine   Date Value Ref Range Status   07/31/2023 0.87 0.57 - 1.00 mg/dL Final   05/16/2023 0.60 mg/dL Final   05/16/2023 0.60 0.60 - 1.30 mg/dL Final   05/16/2023 0.70 0.57 - 1.00 mg/dL Final   05/11/2023 0.90 0.57 - 1.00 mg/dL Final   05/02/2023 0.40 (L) 0.60 - 1.30 mg/dL Final     Comment:     Serial Number: [id number]:  [id number]     No results found for: \"EGFRIFNONA\"      ASSESSMENT:  Problem List Items Addressed This Visit       Coronary artery disease of native artery of native heart with stable angina pectoris - Primary    Overview     3/7/23: 2V cabg with free radial and lewis at [facility] dr. Palomino.  Left Heart Cath 5/2/23:  Conclusion          80% hazy OM 2 branch stenosis status post intervention with a Xience Skypoint 2.5 x 15 mm drug-eluting stent.    Patent LIMA to LAD.  Patent free radial to a diseased, small caliber diagonal branch with an 80% mid segment stenosis.  50% mid RCA stenosis with an RFR of 0.97.    LV pressures (S/D/E) 5 mmHg     Patient will continue atorvastatin,  diltiazem, metoprolol, aspirin and Plavix.    Discussed " red flags and when to follow-up for those.         Hypertension    Hyperlipidemia LDL goal <70    Overview     Atorvastatin 80 mg daily  Fenofibrate 145 mg daily         Relevant Medications    atorvastatin (LIPITOR) 80 MG tablet       PLAN:  Coronary artery disease  CABG x2 H 03/2023  [facility] 5/2/23 with ERNESTO to OM2, patent LIMA to LAD and patent radial to small diseased diagonal, 50% mid RCA with normal RFR.   Echo with normal EF  Continue aspirin and statin  Discontinued Plavix 8/15/2024 as she completed 1 year post PCI     2. Pleuritic chest pain/ post-pericardiotomy syndrome   CTA Chest 5/16 negative for acute findings, no PE, subcentimeter nodules which patient is aware of   S/p Colchicine for 3 months with resolution   No recurrent pleuritic pain since her second course of colchicine      3.   Hypertension   Goal <130/80mmHg   BP well controlled, continue current regimen      4.  Hyperlipidemia  LDL 78 5/2023, needs repeat lipid profile will order  Continue Lipitor 80mg          5. DM2  Follows with endocrinology, having low blood sugar recently they stopped insulin and levo lowered her glipizide  Agree with SGLT2 inhibitor and GLP-1 agonist for cardiovascular protection      Advance Care Planning   ACP discussion was held with the patient during this visit. Patient does not have an advance directive, declines further assistance.          Follow-up   Return in about 1 year (around 8/15/2025).    Saeed Temple MD, FACC, [facility]  Interventional Cardiology

## 2024-09-05 RX ORDER — DILTIAZEM HYDROCHLORIDE 120 MG/1
120 CAPSULE, COATED, EXTENDED RELEASE ORAL DAILY
Qty: 30 CAPSULE | Refills: 0 | Status: SHIPPED | OUTPATIENT
Start: 2024-09-05

## 2024-09-06 DIAGNOSIS — F33.1 MODERATE EPISODE OF RECURRENT MAJOR DEPRESSIVE DISORDER: ICD-10-CM

## 2024-09-06 RX ORDER — CARIPRAZINE 1.5 MG/1
1.5 CAPSULE, GELATIN COATED ORAL DAILY
Qty: 90 CAPSULE | Refills: 0 | Status: SHIPPED | OUTPATIENT
Start: 2024-09-06

## 2024-09-06 NOTE — TELEPHONE ENCOUNTER
LOV 01/09/2024  NOV Not scheduled yet    Follow Up:   Return in about 6 months (around 6/27/2024) for Annual.

## 2024-10-05 DIAGNOSIS — F33.1 MODERATE EPISODE OF RECURRENT MAJOR DEPRESSIVE DISORDER: ICD-10-CM

## 2024-10-07 RX ORDER — VENLAFAXINE HYDROCHLORIDE 75 MG/1
75 CAPSULE, EXTENDED RELEASE ORAL DAILY
Qty: 90 CAPSULE | Refills: 0 | Status: SHIPPED | OUTPATIENT
Start: 2024-10-07

## 2024-10-15 RX ORDER — CLOPIDOGREL BISULFATE 75 MG/1
75 TABLET ORAL DAILY
Qty: 90 TABLET | Refills: 0 | OUTPATIENT
Start: 2024-10-15

## 2024-10-18 ENCOUNTER — OFFICE VISIT (OUTPATIENT)
Dept: INTERNAL MEDICINE | Facility: CLINIC | Age: 59
End: 2024-10-18
Payer: MEDICAID

## 2024-10-18 ENCOUNTER — HOSPITAL ENCOUNTER (OUTPATIENT)
Dept: GENERAL RADIOLOGY | Facility: HOSPITAL | Age: 59
Discharge: HOME | End: 2024-10-18
Payer: MEDICAID

## 2024-10-18 VITALS
WEIGHT: 282 LBS | HEART RATE: 73 BPM | BODY MASS INDEX: 40.37 KG/M2 | HEIGHT: 70 IN | SYSTOLIC BLOOD PRESSURE: 150 MMHG | TEMPERATURE: 96.6 F | OXYGEN SATURATION: 99 % | DIASTOLIC BLOOD PRESSURE: 88 MMHG

## 2024-10-18 DIAGNOSIS — E78.5 HYPERLIPIDEMIA LDL GOAL <70: ICD-10-CM

## 2024-10-18 DIAGNOSIS — I10 PRIMARY HYPERTENSION: ICD-10-CM

## 2024-10-18 DIAGNOSIS — M54.41 ACUTE RIGHT-SIDED LOW BACK PAIN WITH RIGHT-SIDED SCIATICA: ICD-10-CM

## 2024-10-18 DIAGNOSIS — Z23 ENCOUNTER FOR IMMUNIZATION: ICD-10-CM

## 2024-10-18 DIAGNOSIS — E11.43 TYPE 2 DIABETES MELLITUS WITH DIABETIC AUTONOMIC NEUROPATHY, WITH LONG-TERM CURRENT USE OF INSULIN: Primary | ICD-10-CM

## 2024-10-18 DIAGNOSIS — Z79.4 TYPE 2 DIABETES MELLITUS WITH DIABETIC AUTONOMIC NEUROPATHY, WITH LONG-TERM CURRENT USE OF INSULIN: Primary | ICD-10-CM

## 2024-10-18 LAB
EXPIRATION DATE: ABNORMAL
HBA1C MFR BLD: 5.8 % (ref 4.5–5.7)
Lab: ABNORMAL

## 2024-10-18 PROCEDURE — 3044F HG A1C LEVEL LT 7.0%: CPT | Performed by: NURSE PRACTITIONER

## 2024-10-18 PROCEDURE — 3079F DIAST BP 80-89 MM HG: CPT | Performed by: NURSE PRACTITIONER

## 2024-10-18 PROCEDURE — 99214 OFFICE O/P EST MOD 30 MIN: CPT | Performed by: NURSE PRACTITIONER

## 2024-10-18 PROCEDURE — 83036 HEMOGLOBIN GLYCOSYLATED A1C: CPT | Performed by: NURSE PRACTITIONER

## 2024-10-18 PROCEDURE — 1125F AMNT PAIN NOTED PAIN PRSNT: CPT | Performed by: NURSE PRACTITIONER

## 2024-10-18 PROCEDURE — 72100 X-RAY EXAM L-S SPINE 2/3 VWS: CPT

## 2024-10-18 PROCEDURE — 90746 HEPB VACCINE 3 DOSE ADULT IM: CPT | Performed by: NURSE PRACTITIONER

## 2024-10-18 PROCEDURE — 90471 IMMUNIZATION ADMIN: CPT | Performed by: NURSE PRACTITIONER

## 2024-10-18 PROCEDURE — 73502 X-RAY EXAM HIP UNI 2-3 VIEWS: CPT

## 2024-10-18 PROCEDURE — 3077F SYST BP >= 140 MM HG: CPT | Performed by: NURSE PRACTITIONER

## 2024-10-18 RX ORDER — METHYLPREDNISOLONE 4 MG
TABLET, DOSE PACK ORAL
Qty: 21 TABLET | Refills: 0 | Status: SHIPPED | OUTPATIENT
Start: 2024-10-18

## 2024-10-18 NOTE — PROGRESS NOTES
Patient Name: Estefani Blancas  : 1965   MRN: [id number]     Chief Complaint:    Chief Complaint   Patient presents with    Hip Pain     Right hip pain, hurting for a couple weeks. Describes the pain as a burning pain        History of Present Illness: Estefani Blancas is a 58 y.o. female.  History of Present Illness  The patient is a 58-year-old female who presents for evaluation of multiple medical concerns.     Her blood pressure was slightly elevated this morning, but she took her medication as prescribed. At home, her blood pressure readings have been around 117/60s. She is currently on diltiazem once daily for blood pressure management. Her cardiologist discontinued metoprolol due to satisfactory blood pressure control. She reports no chest pain or shortness of breath and continues to follow up with her cardiologist.    She is tolerating atorvastatin 80 mg  well.    She is tolerating semaglutide well, with no reported side effects such as nausea, vomiting, diarrhea, abdominal pain or constipation. Her blood sugar levels are generally well-controlled, although she experiences occasional drops into the 60s. She is also taking Jardiance and glipizide. She suspects her A1c may be higher than expected due to recent dietary habits and lack of weight loss. She is not currently using insulin.    She has not yet received her second hepatitis B vaccine. She believes she has had an influenza vaccine in the past, but is unsure if it was administered this year. She has not followed up on her mammogram.    She has been experiencing right hip pain, which she describes as more of a buttock pain that radiates down her leg. This has been ongoing for a couple of weeks. She works in housekeeping, which involves a lot of walking, bending, and stretching. The pain is described as a burning sensation, but she reports no numbness or tingling in her leg. She has been managing the pain with Tylenol. She is not on any blood  thinners, only aspirin. She reports no loss of bowel or bladder function. She has no history of cancer.       Subjective     Review of System: Review of Systems     Medications:     Current Outpatient Medications:     acetaminophen (TYLENOL) 500 MG tablet, Take 2 tablets by mouth 3 (Three) Times a Day As Needed for Moderate Pain. (Patient taking differently: Take 2 tablets by mouth 3 (Three) Times a Day As Needed for Moderate Pain. PRN ONLY), Disp: 30 tablet, Rfl: 0    aspirin 81 MG EC tablet, Take 1 tablet by mouth Daily., Disp: , Rfl:     atorvastatin (LIPITOR) 80 MG tablet, Take 1 tablet by mouth Daily., Disp: 90 tablet, Rfl: 3    B-D ULTRAFINE III SHORT PEN 31G X 8 MM misc, Use to inject insulin twice daily, Disp: 200 each, Rfl: 0    clobetasol (TEMOVATE) 0.05 % cream, Apply 1 application  topically to the appropriate area as directed 2 (Two) Times a Day., Disp: 60 g, Rfl: 0    Continuous Blood Gluc  (Dexcom G6 ) device, USE AS DIRECTED, Disp: 1 each, Rfl: 0    Continuous Blood Gluc Transmit (Dexcom G6 Transmitter) misc, 1 each Take As Directed., Disp: 1 each, Rfl: 3    Continuous Glucose Sensor (Dexcom G6 Sensor), APPLY  AS DIRECTED, Disp: 3 each, Rfl: 3    Diclofenac Sodium (VOLTAREN) 1 % gel gel, Apply 4 g topically to the appropriate area as directed As Needed. PRN ONLY, Disp: , Rfl:     dilTIAZem CD (CARDIZEM CD) 120 MG 24 hr capsule, TAKE 1 CAPSULE BY MOUTH DAILY, Disp: 30 capsule, Rfl: 0    empagliflozin (Jardiance) 25 MG tablet tablet, Take 1 tablet by mouth once daily, Disp: 90 tablet, Rfl: 1    famotidine (PEPCID) 20 MG tablet, Take 1 tablet by mouth twice daily, Disp: 60 tablet, Rfl: 0    fenofibrate (TRICOR) 145 MG tablet, Take 1 tablet by mouth once daily, Disp: 90 tablet, Rfl: 0    glipizide (GLUCOTROL) 5 MG tablet, Take 2 tablets by mouth 2 (Two) Times a Day Before Meals., Disp: 120 tablet, Rfl: 5    Semaglutide, 2 MG/DOSE, (Ozempic, 2 MG/DOSE,) 8 MG/3ML solution pen-injector,  "Inject 2 mg under the skin into the appropriate area as directed Every 7 (Seven) Days., Disp: 9 mL, Rfl: 1    venlafaxine XR (EFFEXOR-XR) 75 MG 24 hr capsule, Take 1 capsule by mouth once daily, Disp: 90 capsule, Rfl: 0    Vraylar 1.5 MG capsule capsule, Take 1 capsule by mouth once daily, Disp: 90 capsule, Rfl: 0    Bacillus Coagulans-Inulin (Probiotic) 1-250 BILLION-MG capsule, Take 1 capsule by mouth Daily. (Patient not taking: Reported on 10/18/2024), Disp: 30 capsule, Rfl: 0    Blood Pressure Monitoring (Blood Pressure Cuff) misc, Use 1 Units 2 (Two) Times a Day. (Patient not taking: Reported on 10/18/2024), Disp: 1 each, Rfl: 0    fexofenadine (Allegra Allergy) 180 MG tablet, Take 1 tablet by mouth Daily. (Patient not taking: Reported on 10/18/2024), Disp: 90 tablet, Rfl: 0    fluticasone (FLONASE) 50 MCG/ACT nasal spray, 2 sprays into the nostril(s) as directed by provider Daily. (Patient not taking: Reported on 10/18/2024), Disp: 16 g, Rfl: 5    furosemide (LASIX) 40 MG tablet, Take 1 tablet by mouth Daily. (Patient not taking: Reported on 10/18/2024), Disp: 30 tablet, Rfl: 2    methylPREDNISolone (MEDROL) 4 MG dose pack, Take as directed on package instructions., Disp: 21 tablet, Rfl: 0    Allergies:   Allergies   Allergen Reactions    Lisinopril Unknown - High Severity    Benazepril-Hydrochlorothiazide Cough     Other reaction(s): Cough    Latex Hives       Objective     Physical Exam:   Vital Signs:   Vitals:    10/18/24 0813   BP: 150/88   Pulse: 73   Temp: 96.6 °F (35.9 °C)   TempSrc: Temporal   SpO2: 99%   Weight: 128 kg (282 lb)   Height: 177.8 cm (70\")   PainSc:   5   PainLoc: Hip     Physical Exam  Constitutional:       General: She is not in acute distress.     Appearance: She is not ill-appearing.   HENT:      Head: Normocephalic.   Cardiovascular:      Rate and Rhythm: Normal rate and regular rhythm.      Heart sounds: Normal heart sounds. No murmur heard.  Pulmonary:      Breath sounds: Normal " breath sounds.   Abdominal:      General: Bowel sounds are normal.      Tenderness: There is no abdominal tenderness.   Musculoskeletal:         General: Tenderness (right lower back) present.   Neurological:      General: No focal deficit present.      Mental Status: She is oriented to person, place, and time.   Psychiatric:         Mood and Affect: Mood normal.     Physical Exam         Results  Imaging  CT of chest showed a 5 mm nodule in the right middle lobe and a 5 mm subpleural nodule in the left lower lobe.     Assessment / Plan      Assessment/Plan:   Diagnoses and all orders for this visit:    1. Type 2 diabetes mellitus with diabetic autonomic neuropathy, with long-term current use of insulin (Primary)  -     POC Glycosylated Hemoglobin (Hb A1C)    2. Hyperlipidemia LDL goal <70    3. Primary hypertension    4. Encounter for immunization  -     Hepatitis B Vaccine Adult IM    5. Acute right-sided low back pain with right-sided sciatica  -     XR Spine Lumbar 2 or 3 View; Future  -     XR Hip With or Without Pelvis 2 - 3 View Right; Future  -     methylPREDNISolone (MEDROL) 4 MG dose pack; Take as directed on package instructions.  Dispense: 21 tablet; Refill: 0       Assessment & Plan  1. Hypertension.  Her blood pressure is well managed at home, with readings around 117/60s. Continuation of diltiazem once a day is advised. She has been taken off metoprolol by her cardiologist.    2. Diabetes Mellitus.  Her A1c was well controlled 4 months ago. She reports low blood sugar levels daily, sometimes dropping into the 60s.  Discontinuation of glipizide is advised as it is likely causing the hypoglycemia. A message was sent to endocrinology. An A1c test will be conducted today.     3. Hyperlipidemia.  Continuation of atorvastatin 80 mg is advised.    4. Right Hip Pain.  The pain appears to originate from her back, likely due to a flare-up of sciatica. An x-ray of her back and hip will be ordered. A Medrol  Dosepak will be prescribed, to be taken with food. She is advised to take Tylenol 1000 mg three times a day and apply heat and ice. Exercises will be provided, to be performed three times a week for 30 minutes each session, focusing on strengthening, massage, and stretching. If there is no improvement in 2 weeks, physical therapy will be considered/ consider orthopedic/neurosurgery referral or additional imaging.    5. Health Maintenance.  Her microalbumin levels are satisfactory. She is overdue for a CT scan of her chest by approximately a year. Her last mammogram was conducted in March 2024, with additional views recommended. A comprehensive panel of labs will be conducted today. She will receive her second hepatitis B vaccine. A CT scan of her chest will be ordered. A mammogram will be scheduled. The number was given to reschedule mammogram and CT; she will follow-up with pulmonary. She will let me know if she needs a new order (at the time of the visit previous orders were valid).            Follow Up:   One month  Patient or patient representative verbalized consent for the use of Ambient Listening during the visit with  MARK Estrada for chart documentation. 10/22/2024  17:49 EDT    MARK Estrada  [facility]

## 2024-10-18 NOTE — LETTER
[facility]  Vaccine Consent Form    Patient Name:  Estefani Blancas  Patient :  1965     Vaccine(s) Ordered    Hepatitis B Vaccine Adult IM        Screening Checklist  The following questions should be completed prior to vaccination. If you answer “yes” to any question, it does not necessarily mean you should not be vaccinated. It just means we may need to clarify or ask more questions. If a question is unclear, please ask your healthcare provider to explain it.    Yes No   Any fever or moderate to severe illness today (mild illness and/or antibiotic treatment are not contraindications)?     Do you have a history of a serious reaction to any previous vaccinations, such as anaphylaxis, encephalopathy within 7 days, Guillain-Blackshear syndrome within 6 weeks, seizure?     Have you received any live vaccine(s) (e.g MMR, FABIAN) or any other vaccines in the last month (to ensure duplicate doses aren't given)?     Do you have an anaphylactic allergy to latex (DTaP, DTaP-IPV, Hep A, Hep B, MenB, RV, Td, Tdap), baker’s yeast (Hep B, HPV), polysorbates (RSV, nirsevimab, PCV 20, Rotavirrus, Tdap, Shingrix), or gelatin (FABIAN, MMR)?     Do you have an anaphylactic allergy to neomycin (Rabies, FABIAN, MMR, IPV, Hep A), polymyxin B (IPV), or streptomycin (IPV)?      Any cancer, leukemia, AIDS, or other immune system disorder? (FABIAN, MMR, RV)     Do you have a parent, brother, or sister with an immune system problem (if immune competence of vaccine recipient clinically verified, can proceed)? (MMR, FABIAN)     Any recent steroid treatments for >2 weeks, chemotherapy, or radiation treatment? (FABIAN, MMR)     Have you received antibody-containing blood transfusions or IVIG in the past 11 months (recommended interval is dependent on product)? (MMR, FABIAN)     Have you taken antiviral drugs (acyclovir, famciclovir, valacyclovir for FABIAN) in the last 24 or 48 hours, respectively?      Are you pregnant or planning to become pregnant within 1  "month? (FABIAN, MMR, HPV, IPV, MenB, Abrexvy; For Hep B- refer to Engerix-B; For RSV - Abrysvo is indicated for 32-36 weeks of pregnancy from September to January)     For infants, have you ever been told your child has had intussusception or a medical emergency involving obstruction of the intestine (Rotavirus)? If not for an infant, can skip this question.         *Ordering Physicians/APC should be consulted if \"yes\" is checked by the patient or guardian above.  I have received, read, and understand the Vaccine Information Statement (VIS) for each vaccine ordered.  I have considered my or my child's health status as well as the health status of my close contacts.  I have taken the opportunity to discuss my vaccine questions with my or my child's health care provider.   I have requested that the ordered vaccine(s) be given to me or my child.  I understand the benefits and risks of the vaccines.  I understand that I should remain in the clinic for 15 minutes after receiving the vaccine(s).  _________________________________________________________  Signature of Patient or Parent/Legal Guardian ____________________  Date   "

## 2024-10-21 ENCOUNTER — TELEPHONE (OUTPATIENT)
Dept: INTERNAL MEDICINE | Facility: CLINIC | Age: 59
End: 2024-10-21
Payer: MEDICAID

## 2024-10-21 NOTE — TELEPHONE ENCOUNTER
----- Message from Leeanna Tovar sent at 10/21/2024 11:52 AM EDT -----  Patient has pathology in hip and spine.  Right hip shows degenerative changes.  If no improvement in hip pain symptoms consider surgical consultation.  Lumbar spine shows degenerative changes.  Continue plan discussed at your appointment.  This may need further evaluation depending on symptoms.

## 2024-11-04 DIAGNOSIS — Z79.4 TYPE 2 DIABETES MELLITUS WITH HYPERGLYCEMIA, WITH LONG-TERM CURRENT USE OF INSULIN: ICD-10-CM

## 2024-11-04 DIAGNOSIS — E11.65 TYPE 2 DIABETES MELLITUS WITH HYPERGLYCEMIA, WITH LONG-TERM CURRENT USE OF INSULIN: ICD-10-CM

## 2024-11-05 DIAGNOSIS — E11.65 TYPE 2 DIABETES MELLITUS WITH HYPERGLYCEMIA, WITH LONG-TERM CURRENT USE OF INSULIN: ICD-10-CM

## 2024-11-05 DIAGNOSIS — Z79.4 TYPE 2 DIABETES MELLITUS WITH HYPERGLYCEMIA, WITH LONG-TERM CURRENT USE OF INSULIN: ICD-10-CM

## 2024-11-05 RX ORDER — SEMAGLUTIDE 2.68 MG/ML
2 INJECTION, SOLUTION SUBCUTANEOUS
Qty: 9 ML | Refills: 0 | Status: SHIPPED | OUTPATIENT
Start: 2024-11-05

## 2024-11-05 RX ORDER — GLIPIZIDE 5 MG/1
10 TABLET ORAL
Qty: 120 TABLET | Refills: 0 | Status: SHIPPED | OUTPATIENT
Start: 2024-11-05

## 2024-11-05 RX ORDER — PROCHLORPERAZINE 25 MG/1
SUPPOSITORY RECTAL
Qty: 1 EACH | Refills: 0 | Status: SHIPPED | OUTPATIENT
Start: 2024-11-05

## 2024-11-05 NOTE — TELEPHONE ENCOUNTER
Rx Refill Note  Requested Prescriptions     Pending Prescriptions Disp Refills    Continuous Glucose Transmitter (Dexcom G6 Transmitter) misc [Pharmacy Med Name: DEXCOM G6 TRANSMIT  MIS] 1 each 0     Sig: USE 1  AS DIRECTED      Last office visit with prescribing clinician: 5/23/2024     Next office visit with prescribing clinician: 11/21/2024

## 2024-11-05 NOTE — TELEPHONE ENCOUNTER
Rx Refill Note  Requested Prescriptions     Pending Prescriptions Disp Refills    glipizide (GLUCOTROL) 5 MG tablet 120 tablet 0     Sig: Take 2 tablets by mouth 2 (Two) Times a Day Before Meals.    Semaglutide, 2 MG/DOSE, (Ozempic, 2 MG/DOSE,) 8 MG/3ML solution pen-injector 9 mL 0     Sig: Inject 2 mg under the skin into the appropriate area as directed Every 7 (Seven) Days.      Last office visit with prescribing clinician: 5/23/2024   Last telemedicine visit with prescribing clinician: Visit date not found   Next office visit with prescribing clinician: 11/21/2024                         Would you like a call back once the refill request has been completed: [] Yes [] No    If the office needs to give you a call back, can they leave a voicemail: [] Yes [] No    Princess Steven MA  11/05/24, 14:19 EST

## 2024-11-15 ENCOUNTER — PATIENT OUTREACH (OUTPATIENT)
Dept: CASE MANAGEMENT | Facility: OTHER | Age: 59
End: 2024-11-15
Payer: MEDICAID

## 2024-11-15 DIAGNOSIS — I10 PRIMARY HYPERTENSION: Primary | ICD-10-CM

## 2024-11-15 NOTE — OUTREACH NOTE
AMBULATORY CASE MANAGEMENT NOTE    Names and Relationships of Patient/Support Persons: Contact: Estefani Blancas; Relationship: Self -     MyChart Hypertension Care Companion Enrollment    Date/Time of successful contact: 11/15/2024  2:47 PM  Patient response: interested  Comments: NURSING COSIGN ORDER INITIATED WITH THIS ENCOUNTER  Enrolling provider: Leeanna Tovar APRN  Patient has/had own BP monitoring equipment?: no  CM/office assisted w/ BP equipment: self pay     Patient said she does not have a blood pressure monitor at her home, but has access to one at work every day.  She said she would like to utilize this MyChart program and enter her BP's into [facility]; she said she thinks can do this on her lunch break every day.      Education Documentation  Provider Follow-Up, taught by Najma Child, RN at 11/15/2024  3:01 PM.  Learner: Patient  Readiness: Acceptance  Method: Explanation  Response: Verbalizes Understanding    Medication Management, taught by Najma Child, RN at 11/15/2024  3:01 PM.  Learner: Patient  Readiness: Acceptance  Method: Explanation  Response: Verbalizes Understanding    Blood Pressure Monitoring, taught by Najma Child, RN at 11/15/2024  3:01 PM.  Learner: Patient  Readiness: Acceptance  Method: Explanation  Response: Verbalizes Understanding          Najma SMITH  Ambulatory Case Management    11/15/2024, 15:02 EST

## 2024-11-19 RX ORDER — DILTIAZEM HYDROCHLORIDE 120 MG/1
120 CAPSULE, COATED, EXTENDED RELEASE ORAL DAILY
Qty: 90 CAPSULE | Refills: 0 | Status: SHIPPED | OUTPATIENT
Start: 2024-11-19

## 2024-12-02 DIAGNOSIS — Z79.4 TYPE 2 DIABETES MELLITUS WITH HYPERGLYCEMIA, WITH LONG-TERM CURRENT USE OF INSULIN: ICD-10-CM

## 2024-12-02 DIAGNOSIS — F33.1 MODERATE EPISODE OF RECURRENT MAJOR DEPRESSIVE DISORDER: ICD-10-CM

## 2024-12-02 DIAGNOSIS — E11.65 TYPE 2 DIABETES MELLITUS WITH HYPERGLYCEMIA, WITH LONG-TERM CURRENT USE OF INSULIN: ICD-10-CM

## 2024-12-02 RX ORDER — CARIPRAZINE 1.5 MG/1
1.5 CAPSULE, GELATIN COATED ORAL DAILY
Qty: 90 CAPSULE | Refills: 0 | Status: SHIPPED | OUTPATIENT
Start: 2024-12-02

## 2024-12-02 NOTE — TELEPHONE ENCOUNTER
LOV 10/18/2024  NOV Not scheduled yet    Follow Up:   One month    We recently received your message to refill your medication(s).  Our records indicate that you did not schedule a follow up appointment with your provider at your last visit on 10/18/2024. The medication that you are on requires a follow up appointment for additional refills. Patient was to schedule on or around 11/18/2024 for 1 month follow up

## 2024-12-02 NOTE — TELEPHONE ENCOUNTER
Rx Refill Note  Requested Prescriptions     Pending Prescriptions Disp Refills    empagliflozin (Jardiance) 25 MG tablet tablet 90 tablet 1     Sig: Take 1 tablet by mouth Daily.      Last office visit with prescribing clinician: 5/23/2024     Next office visit with prescribing clinician: Visit date not found

## 2024-12-04 DIAGNOSIS — E11.65 TYPE 2 DIABETES MELLITUS WITH HYPERGLYCEMIA, WITH LONG-TERM CURRENT USE OF INSULIN: ICD-10-CM

## 2024-12-04 DIAGNOSIS — Z79.4 TYPE 2 DIABETES MELLITUS WITH HYPERGLYCEMIA, WITH LONG-TERM CURRENT USE OF INSULIN: ICD-10-CM

## 2024-12-04 RX ORDER — GLIPIZIDE 5 MG/1
TABLET ORAL
Qty: 120 TABLET | Refills: 0 | OUTPATIENT
Start: 2024-12-04

## 2024-12-04 NOTE — TELEPHONE ENCOUNTER
Rx Refill Note  Requested Prescriptions     Pending Prescriptions Disp Refills    glipizide (GLUCOTROL) 5 MG tablet [Pharmacy Med Name: glipiZIDE 5 MG Oral Tablet] 120 tablet 0     Sig: TAKE 2 TABLETS BY MOUTH TWICE DAILY BEFORE MEAL(S)      Last office visit with prescribing clinician: Visit date not found     Next office visit with prescribing clinician: Visit date not found

## 2024-12-19 ENCOUNTER — HOSPITAL ENCOUNTER (OUTPATIENT)
Dept: GENERAL RADIOLOGY | Facility: HOSPITAL | Age: 59
Discharge: HOME | End: 2024-12-19
Admitting: NURSE PRACTITIONER
Payer: MEDICAID

## 2024-12-19 ENCOUNTER — OFFICE VISIT (OUTPATIENT)
Dept: INTERNAL MEDICINE | Facility: CLINIC | Age: 59
End: 2024-12-19
Payer: MEDICAID

## 2024-12-19 VITALS
BODY MASS INDEX: 40.89 KG/M2 | HEIGHT: 70 IN | TEMPERATURE: 96.8 F | SYSTOLIC BLOOD PRESSURE: 118 MMHG | RESPIRATION RATE: 16 BRPM | DIASTOLIC BLOOD PRESSURE: 80 MMHG | WEIGHT: 285.6 LBS | HEART RATE: 80 BPM

## 2024-12-19 DIAGNOSIS — I10 PRIMARY HYPERTENSION: Primary | ICD-10-CM

## 2024-12-19 DIAGNOSIS — Z99.81 DEPENDENCE ON CONTINUOUS SUPPLEMENTAL OXYGEN: ICD-10-CM

## 2024-12-19 DIAGNOSIS — Z79.4 TYPE 2 DIABETES MELLITUS WITH DIABETIC AUTONOMIC NEUROPATHY, WITH LONG-TERM CURRENT USE OF INSULIN: Primary | ICD-10-CM

## 2024-12-19 DIAGNOSIS — F41.9 ANXIETY: ICD-10-CM

## 2024-12-19 DIAGNOSIS — Z23 ENCOUNTER FOR IMMUNIZATION: ICD-10-CM

## 2024-12-19 DIAGNOSIS — M25.551 RIGHT HIP PAIN: ICD-10-CM

## 2024-12-19 DIAGNOSIS — R09.02 HYPOXIA: ICD-10-CM

## 2024-12-19 DIAGNOSIS — F33.1 MODERATE EPISODE OF RECURRENT MAJOR DEPRESSIVE DISORDER: ICD-10-CM

## 2024-12-19 DIAGNOSIS — R05.1 ACUTE COUGH: ICD-10-CM

## 2024-12-19 DIAGNOSIS — E11.43 TYPE 2 DIABETES MELLITUS WITH DIABETIC AUTONOMIC NEUROPATHY, WITH LONG-TERM CURRENT USE OF INSULIN: Primary | ICD-10-CM

## 2024-12-19 DIAGNOSIS — R91.1 LUNG NODULE: ICD-10-CM

## 2024-12-19 PROCEDURE — 1125F AMNT PAIN NOTED PAIN PRSNT: CPT | Performed by: NURSE PRACTITIONER

## 2024-12-19 PROCEDURE — 99214 OFFICE O/P EST MOD 30 MIN: CPT | Performed by: NURSE PRACTITIONER

## 2024-12-19 PROCEDURE — 90746 HEPB VACCINE 3 DOSE ADULT IM: CPT | Performed by: NURSE PRACTITIONER

## 2024-12-19 PROCEDURE — 71046 X-RAY EXAM CHEST 2 VIEWS: CPT

## 2024-12-19 PROCEDURE — 3074F SYST BP LT 130 MM HG: CPT | Performed by: NURSE PRACTITIONER

## 2024-12-19 PROCEDURE — 3079F DIAST BP 80-89 MM HG: CPT | Performed by: NURSE PRACTITIONER

## 2024-12-19 PROCEDURE — 3044F HG A1C LEVEL LT 7.0%: CPT | Performed by: NURSE PRACTITIONER

## 2024-12-19 PROCEDURE — 1160F RVW MEDS BY RX/DR IN RCRD: CPT | Performed by: NURSE PRACTITIONER

## 2024-12-19 PROCEDURE — 1159F MED LIST DOCD IN RCRD: CPT | Performed by: NURSE PRACTITIONER

## 2024-12-19 PROCEDURE — 90471 IMMUNIZATION ADMIN: CPT | Performed by: NURSE PRACTITIONER

## 2024-12-19 RX ORDER — HYDROXYZINE HYDROCHLORIDE 10 MG/1
TABLET, FILM COATED ORAL
Qty: 90 TABLET | Refills: 1 | Status: SHIPPED | OUTPATIENT
Start: 2024-12-19

## 2024-12-19 RX ORDER — IBUPROFEN 600 MG/1
1 TABLET ORAL ONCE AS NEEDED
Qty: 1 EACH | Refills: 1 | Status: SHIPPED | OUTPATIENT
Start: 2024-12-19

## 2024-12-19 RX ORDER — DOXYCYCLINE 100 MG/1
100 CAPSULE ORAL 2 TIMES DAILY
Qty: 20 CAPSULE | Refills: 0 | Status: SHIPPED | OUTPATIENT
Start: 2024-12-19

## 2024-12-19 RX ORDER — DILTIAZEM HYDROCHLORIDE 120 MG/1
120 CAPSULE, COATED, EXTENDED RELEASE ORAL DAILY
Qty: 90 CAPSULE | Refills: 1 | Status: SHIPPED | OUTPATIENT
Start: 2024-12-19

## 2024-12-19 NOTE — TELEPHONE ENCOUNTER
Name: Estefani Blancas    Relationship: Self    Best Callback Number: [phone]     HUB PROVIDED THE RELAY MESSAGE FROM THE OFFICE   PATIENT SCHEDULED AS REQUESTED

## 2024-12-19 NOTE — TELEPHONE ENCOUNTER
Last office visit (LOV) for chronic condition [id number]  Next office visit (NOV)     Javi please relay and schedule   She was supposed to follow up in November  Will need to schedule appointment and then we can send in refill

## 2024-12-19 NOTE — PROGRESS NOTES
Patient Name: Estefani Blancas  : 1965   MRN: [id number]     Chief Complaint:    Chief Complaint   Patient presents with    Diabetes     Follow up       History of Present Illness: Estefani Blancas is a 59 y.o. female.  History of Present Illness  The patient presents for evaluation of right hip pain, hypoglycemia, anxiety, and medication management.    She reports persistent back pain, with the right hip being more bothersome than the back. She experiences a burning sensation when lying on her right side. She has been engaging in exercises for over 6 weeks but continues to experience discomfort. She has not undergone a CT scan of the spine but has had an open MRI once. She is not considering surgical intervention at this time. She recently received disability status and is attempting to maintain a low activity level. A previous course of steroids provided some relief. She uses oxygen at night since covid in .     She has been making efforts to lose weight but reports experiencing hypoglycemic episodes, necessitating increased food intake. She is under the care of an endocrinologist but missed her last appointment. She has discontinued glipizide and is currently on Jardiance and Ozempic. Her blood glucose levels have not been as low as previously, but they remain decreased.    She is currently on diltiazem for palpitations and blood pressure management. She reports no chest pain, palpitations, or shortness of breath. She is seeking a refill of her diltiazem prescription and requests that it be transferred to [facility] pharmacy.    She continues to take Vraylar and Effexor but reports increased anxiety due to personal circumstances. She is requesting additional medication for anxiety management. She has previously taken hydroxyzine, which was effective, but she does not recall the dosage. She reports no suicidal ideation.    She has not yet completed the CT scan ordered by her  pulmonologist.    MEDICATIONS  Current: Jardiance, Ozempic, Vraylar, Effexor, diltiazem               Subjective     Review of System: Review of Systems     Medications:     Current Outpatient Medications:     acetaminophen (TYLENOL) 500 MG tablet, Take 2 tablets by mouth 3 (Three) Times a Day As Needed for Moderate Pain. (Patient taking differently: Take 2 tablets by mouth 3 (Three) Times a Day As Needed for Moderate Pain. PRN ONLY), Disp: 30 tablet, Rfl: 0    aspirin 81 MG EC tablet, Take 1 tablet by mouth Daily., Disp: , Rfl:     atorvastatin (LIPITOR) 80 MG tablet, Take 1 tablet by mouth Daily., Disp: 90 tablet, Rfl: 3    B-D ULTRAFINE III SHORT PEN 31G X 8 MM misc, Use to inject insulin twice daily, Disp: 200 each, Rfl: 0    Blood Pressure Monitoring (Blood Pressure Cuff) misc, Use 1 Units 2 (Two) Times a Day., Disp: 1 each, Rfl: 0    clobetasol (TEMOVATE) 0.05 % cream, Apply 1 application  topically to the appropriate area as directed 2 (Two) Times a Day., Disp: 60 g, Rfl: 0    Continuous Blood Gluc  (Dexcom G6 ) device, USE AS DIRECTED, Disp: 1 each, Rfl: 0    Continuous Glucose Sensor (Dexcom G6 Sensor), APPLY  AS DIRECTED, Disp: 3 each, Rfl: 3    Continuous Glucose Transmitter (Dexcom G6 Transmitter) misc, USE 1  AS DIRECTED, Disp: 1 each, Rfl: 0    Diclofenac Sodium (VOLTAREN) 1 % gel gel, Apply 4 g topically to the appropriate area as directed As Needed. PRN ONLY, Disp: , Rfl:     empagliflozin (Jardiance) 25 MG tablet tablet, Take 1 tablet by mouth once daily, Disp: 90 tablet, Rfl: 1    fenofibrate (TRICOR) 145 MG tablet, Take 1 tablet by mouth once daily, Disp: 90 tablet, Rfl: 0    Semaglutide, 2 MG/DOSE, (Ozempic, 2 MG/DOSE,) 8 MG/3ML solution pen-injector, Inject 2 mg under the skin into the appropriate area as directed Every 7 (Seven) Days., Disp: 9 mL, Rfl: 0    venlafaxine XR (EFFEXOR-XR) 75 MG 24 hr capsule, Take 1 capsule by mouth once daily, Disp: 90 capsule, Rfl: 0    Vraylar  "1.5 MG capsule capsule, Take 1 capsule by mouth once daily, Disp: 90 capsule, Rfl: 0    dilTIAZem CD (CARDIZEM CD) 120 MG 24 hr capsule, TAKE 1 CAPSULE BY MOUTH DAILY, Disp: 90 capsule, Rfl: 1    doxycycline (VIBRAMYCIN) 100 MG capsule, Take 1 capsule by mouth 2 (Two) Times a Day., Disp: 20 capsule, Rfl: 0    glucagon (GLUCAGEN) 1 MG injection, Inject 1 mg under the skin into the appropriate area as directed 1 (One) Time As Needed (hypoglycemia)., Disp: 1 each, Rfl: 1    hydrOXYzine (ATARAX) 10 MG tablet, 1-2 tablets by mouth every 8 hours as needed for anxiety, Disp: 90 tablet, Rfl: 1    Allergies:   Allergies   Allergen Reactions    Lisinopril Unknown - High Severity    Benazepril-Hydrochlorothiazide Cough     Other reaction(s): Cough    Latex Hives       Objective     Physical Exam:   Vital Signs:   Vitals:    12/19/24 0952   BP: 118/80   BP Location: Right arm   Patient Position: Sitting   Cuff Size: Adult   Pulse: 80   Resp: 16   Temp: 96.8 °F (36 °C)   TempSrc: Infrared   Weight: 130 kg (285 lb 9.6 oz)   Height: 177.8 cm (70\")   PainSc:   3           Physical Exam  Physical Exam  Rales are present in the left base of the lungs. The rest of the lungs are clear.  Heart rate is regular.  The patient has been tearful during this visit.    Vital Signs  Blood pressure is 118/80.       Results  Imaging  Degeneration in the right hip, marginal osteophytes and subchondral sclerosis noted. Changes in L2-3, L4-5, and L5-S1 with some narrowing noted.     Assessment / Plan      Assessment/Plan:   Diagnoses and all orders for this visit:    1. Type 2 diabetes mellitus with diabetic autonomic neuropathy, with long-term current use of insulin (Primary)  -     glucagon (GLUCAGEN) 1 MG injection; Inject 1 mg under the skin into the appropriate area as directed 1 (One) Time As Needed (hypoglycemia).  Dispense: 1 each; Refill: 1    2. Encounter for immunization  -     Hepatitis B Vaccine Adult IM; Future    3. Moderate episode of " recurrent major depressive disorder    4. Anxiety  -     hydrOXYzine (ATARAX) 10 MG tablet; 1-2 tablets by mouth every 8 hours as needed for anxiety  Dispense: 90 tablet; Refill: 1    5. Dependence on continuous supplemental oxygen    6. Acute cough  -     XR Chest PA & Lateral; Future  -     doxycycline (VIBRAMYCIN) 100 MG capsule; Take 1 capsule by mouth 2 (Two) Times a Day.  Dispense: 20 capsule; Refill: 0    7. Hypoxia  -     XR Chest PA & Lateral; Future  -     doxycycline (VIBRAMYCIN) 100 MG capsule; Take 1 capsule by mouth 2 (Two) Times a Day.  Dispense: 20 capsule; Refill: 0       Assessment & Plan  1. Right hip pain.  The patient reports persistent pain in the right hip, which is more bothersome than her back pain. Previous steroids provided temporary relief. She has been performing exercises for over 6 weeks without significant improvement. Imaging shows degeneration in the right hip with marginal osteophytes and subchondral sclerosis. She is advised to continue her exercises and monitor her symptoms. She is advised to consider weight loss if contemplating surgery. A referral to an orthopedist will be made for further evaluation. If the pain worsens, she will notify the provider.    2. Type 2 diabetes   The patient reports that she has blood sugars in the 70s; however this has improved since stopping glipizide.  She is currently on Jardiance and Ozempic. She did not see her endocrinologist at the last visit. She will reschedule.  A glucagon pen will be prescribed to manage any episodes of hypoglycemia.    3. Anxiety.  The patient reports increased anxiety due to personal stressors. She is currently on Effexor and Vraylar. Hydroxyzine 10 mg or 20 mg, to be taken as needed up to three times a day, will be prescribed. She is informed that hydroxyzine can cause drowsiness, and she should monitor for this side effect. If additional medication is needed, further adjustments will be considered.    4. Chronic  shortness of breath.  The patient has chronic shortness of breath and use of oxygen without clear etiology, first started after COVID-19 2023. She is followed by a pulmonologist and is overdue for her CT scan. She has noticed a dry cough for about a month and oxygen levels have been intermittently below 90. She does have some rales in her bases on the left base. Otherwise, she is clear. A chest x-ray will be done today. She will start on doxycycline. She is instructed to take doxycycline with food, separate it from her vitamins, and not lie down for 30 minutes after taking it. The provider will reach out to her in a couple of weeks to ensure her breathing has improved and reschedule her CT scan. She wanted to delay it due to holidays. She is instructed to follow up with her pulmonologist for further workup of hypoxia. She also has some lung nodules that need follow-up.    5. Medication management.  The patient requests a refill of diltiazem and [facility] of [facility]. Her blood pressure is 118/80 mmHg, and she reports no chest pains, palpitations, or shortness of breath. A prescription for diltiazem will be renewed and sent to [facility].         Follow Up:   Return in about 3 months (around 3/19/2025) for Annual.  Patient or patient representative verbalized consent for the use of Ambient Listening during the visit with  MARK Estrada for chart documentation. 12/19/2024  10:48 EST    MARK Estrada  [facility]

## 2024-12-19 NOTE — LETTER
[facility]  Vaccine Consent Form    Patient Name:  Estefani Blancas  Patient :  1965     Vaccine(s) Ordered    Hepatitis B Vaccine Adult IM        Screening Checklist  The following questions should be completed prior to vaccination. If you answer “yes” to any question, it does not necessarily mean you should not be vaccinated. It just means we may need to clarify or ask more questions. If a question is unclear, please ask your healthcare provider to explain it.    Yes No   Any fever or moderate to severe illness today (mild illness and/or antibiotic treatment are not contraindications)?     Do you have a history of a serious reaction to any previous vaccinations, such as anaphylaxis, encephalopathy within 7 days, Guillain-[place] syndrome within 6 weeks, seizure?     Have you received any live vaccine(s) (e.g MMR, FABIAN) or any other vaccines in the last month (to ensure duplicate doses aren't given)?     Do you have an anaphylactic allergy to latex (DTaP, DTaP-IPV, Hep A, Hep B, MenB, RV, Td, Tdap), baker’s yeast (Hep B, HPV), polysorbates (RSV, nirsevimab, PCV 20, Rotavirrus, Tdap, Shingrix), or gelatin (FABIAN, MMR)?     Do you have an anaphylactic allergy to neomycin (Rabies, FABIAN, MMR, IPV, Hep A), polymyxin B (IPV), or streptomycin (IPV)?      Any cancer, leukemia, AIDS, or other immune system disorder? (FABIAN, MMR, RV)     Do you have a parent, brother, or sister with an immune system problem (if immune competence of vaccine recipient clinically verified, can proceed)? (MMR, FABIAN)     Any recent steroid treatments for >2 weeks, chemotherapy, or radiation treatment? (FABIAN, MMR)     Have you received antibody-containing blood transfusions or IVIG in the past 11 months (recommended interval is dependent on product)? (MMR, FABIAN)     Have you taken antiviral drugs (acyclovir, famciclovir, valacyclovir for FABIAN) in the last 24 or 48 hours, respectively?      Are you pregnant or planning to become pregnant within 1  "month? (FABIAN, MMR, HPV, IPV, MenB, Abrexvy; For Hep B- refer to Engerix-B; For RSV - Abrysvo is indicated for 32-36 weeks of pregnancy from September to January)     For infants, have you ever been told your child has had intussusception or a medical emergency involving obstruction of the intestine (Rotavirus)? If not for an infant, can skip this question.         *Ordering Physicians/APC should be consulted if \"yes\" is checked by the patient or guardian above.  I have received, read, and understand the Vaccine Information Statement (VIS) for each vaccine ordered.  I have considered my or my child's health status as well as the health status of my close contacts.  I have taken the opportunity to discuss my vaccine questions with my or my child's health care provider.   I have requested that the ordered vaccine(s) be given to me or my child.  I understand the benefits and risks of the vaccines.  I understand that I should remain in the clinic for 15 minutes after receiving the vaccine(s).  _________________________________________________________  Signature of Patient or Parent/Legal Guardian ____________________  Date     "

## 2024-12-20 ENCOUNTER — TELEPHONE (OUTPATIENT)
Dept: INTERNAL MEDICINE | Facility: CLINIC | Age: 59
End: 2024-12-20
Payer: MEDICAID

## 2024-12-20 DIAGNOSIS — R06.02 SHORTNESS OF BREATH: Primary | ICD-10-CM

## 2024-12-20 RX ORDER — ALBUTEROL SULFATE 90 UG/1
2 INHALANT RESPIRATORY (INHALATION) EVERY 4 HOURS PRN
Qty: 18 G | Refills: 0 | Status: SHIPPED | OUTPATIENT
Start: 2024-12-20

## 2024-12-20 NOTE — TELEPHONE ENCOUNTER
Please continue antibiotics; I have sent in an albuterol to try; please reschedule with pulmonary. Let me know if no improvement in two weeks.

## 2024-12-20 NOTE — TELEPHONE ENCOUNTER
I left a message on the patients voicemail to call our office back, office number provided.     HUB relay:    Please continue antibiotics; I have sent in an albuterol to try; please reschedule with pulmonary. Let me know if no improvement in two weeks.

## 2024-12-20 NOTE — TELEPHONE ENCOUNTER
Patient notified and verbalized understanding.  Patient asking if she should continue antibiotics?  Also asking if you are going to call her in an inhaler

## 2024-12-20 NOTE — TELEPHONE ENCOUNTER
----- Message from Leeanna Tovar sent at 12/19/2024  8:19 PM EST -----  Xray is normal. Continue current plan discussed at your appointment.

## 2024-12-20 NOTE — TELEPHONE ENCOUNTER
Name: Estefani Blancas Mary      Relationship: Self      Best Callback Number: [phone]       HUB PROVIDED THE RELAY MESSAGE FROM THE OFFICE      PATIENT: VOICED UNDERSTANDING AND HAS NO FURTHER QUESTIONS AT THIS TIME    ADDITIONAL INFORMATION:

## 2025-01-01 DIAGNOSIS — E11.65 TYPE 2 DIABETES MELLITUS WITH HYPERGLYCEMIA, WITH LONG-TERM CURRENT USE OF INSULIN: ICD-10-CM

## 2025-01-01 DIAGNOSIS — Z79.4 TYPE 2 DIABETES MELLITUS WITH HYPERGLYCEMIA, WITH LONG-TERM CURRENT USE OF INSULIN: ICD-10-CM

## 2025-01-02 RX ORDER — PROCHLORPERAZINE 25 MG/1
SUPPOSITORY RECTAL SEE ADMIN INSTRUCTIONS
Qty: 3 EACH | Refills: 0 | Status: SHIPPED | OUTPATIENT
Start: 2025-01-02

## 2025-01-02 NOTE — TELEPHONE ENCOUNTER
Rx Refill Note  Requested Prescriptions     Pending Prescriptions Disp Refills    Continuous Glucose Sensor (Dexcom G6 Sensor) [Pharmacy Med Name: DEXCOM G6 SENSOR    MIS] 3 each 0     Sig: APPLY  AS DIRECTED      Last office visit with prescribing clinician: 5/23/2024   Last telemedicine visit with prescribing clinician: Visit date not found   Next office visit with prescribing clinician: Visit date not found                         Would you like a call back once the refill request has been completed: [] Yes [] No    If the office needs to give you a call back, can they leave a voicemail: [] Yes [] No    Princess Steven MA  01/02/25, 09:01 EST

## 2025-01-04 DIAGNOSIS — F33.1 MODERATE EPISODE OF RECURRENT MAJOR DEPRESSIVE DISORDER: ICD-10-CM

## 2025-01-06 RX ORDER — VENLAFAXINE HYDROCHLORIDE 37.5 MG/1
CAPSULE, EXTENDED RELEASE ORAL
Qty: 60 CAPSULE | Refills: 0 | OUTPATIENT
Start: 2025-01-06

## 2025-01-07 ENCOUNTER — TELEPHONE (OUTPATIENT)
Dept: ORTHOPEDIC SURGERY | Facility: CLINIC | Age: 60
End: 2025-01-07
Payer: MEDICAID

## 2025-01-08 DIAGNOSIS — Z79.4 TYPE 2 DIABETES MELLITUS WITH HYPERGLYCEMIA, WITH LONG-TERM CURRENT USE OF INSULIN: ICD-10-CM

## 2025-01-08 DIAGNOSIS — E11.65 TYPE 2 DIABETES MELLITUS WITH HYPERGLYCEMIA, WITH LONG-TERM CURRENT USE OF INSULIN: ICD-10-CM

## 2025-01-08 RX ORDER — EMPAGLIFLOZIN 25 MG/1
25 TABLET, FILM COATED ORAL DAILY
Qty: 90 TABLET | Refills: 0 | OUTPATIENT
Start: 2025-01-08

## 2025-01-08 NOTE — TELEPHONE ENCOUNTER
Rx Refill Note  Requested Prescriptions     Pending Prescriptions Disp Refills    Jardiance 25 MG tablet tablet [Pharmacy Med Name: Jardiance 25 MG Oral Tablet] 90 tablet 0     Sig: Take 1 tablet by mouth once daily      Last office visit with prescribing clinician: 5/23/2024     Next office visit with prescribing clinician: Patient will need to schedule an appointment for more refills.        Sadia Jason  01/08/25, 10:48 EST

## 2025-01-21 ENCOUNTER — TELEPHONE (OUTPATIENT)
Dept: ENDOCRINOLOGY | Facility: CLINIC | Age: 60
End: 2025-01-21
Payer: MEDICAID

## 2025-01-27 ENCOUNTER — OFFICE VISIT (OUTPATIENT)
Dept: ORTHOPEDIC SURGERY | Facility: CLINIC | Age: 60
End: 2025-01-27
Payer: MEDICAID

## 2025-01-27 VITALS
WEIGHT: 284 LBS | BODY MASS INDEX: 40.66 KG/M2 | SYSTOLIC BLOOD PRESSURE: 126 MMHG | DIASTOLIC BLOOD PRESSURE: 82 MMHG | HEIGHT: 70 IN

## 2025-01-27 DIAGNOSIS — E66.01 CLASS 3 SEVERE OBESITY WITHOUT SERIOUS COMORBIDITY WITH BODY MASS INDEX (BMI) OF 40.0 TO 44.9 IN ADULT, UNSPECIFIED OBESITY TYPE: ICD-10-CM

## 2025-01-27 DIAGNOSIS — M16.11 PRIMARY OSTEOARTHRITIS OF RIGHT HIP: Primary | ICD-10-CM

## 2025-01-27 DIAGNOSIS — E66.813 CLASS 3 SEVERE OBESITY WITHOUT SERIOUS COMORBIDITY WITH BODY MASS INDEX (BMI) OF 40.0 TO 44.9 IN ADULT, UNSPECIFIED OBESITY TYPE: ICD-10-CM

## 2025-01-27 PROCEDURE — 1159F MED LIST DOCD IN RCRD: CPT | Performed by: ORTHOPAEDIC SURGERY

## 2025-01-27 PROCEDURE — 1160F RVW MEDS BY RX/DR IN RCRD: CPT | Performed by: ORTHOPAEDIC SURGERY

## 2025-01-27 PROCEDURE — 3074F SYST BP LT 130 MM HG: CPT | Performed by: ORTHOPAEDIC SURGERY

## 2025-01-27 PROCEDURE — 3079F DIAST BP 80-89 MM HG: CPT | Performed by: ORTHOPAEDIC SURGERY

## 2025-01-27 PROCEDURE — 99204 OFFICE O/P NEW MOD 45 MIN: CPT | Performed by: ORTHOPAEDIC SURGERY

## 2025-01-27 NOTE — PROGRESS NOTES
[facility] [place] Note    Subjective     Chief Complaint   Patient presents with    Right Hip - Pain        HPI    Estefani Blancas is a 59 y.o. female who presents with new problem of: right hip pain.  Onset: atraumatic and gradual in nature. The issue has been ongoing for 1 month(s). Pain is a 1/10 on the pain scale. Pain is described as dull and burning. Associated symptoms include pain, popping, and stiffness. The pain is worse with sitting; resting improve the pain. Previous treatments have included: nothing.  Pain is located on the lateral aspect of the hip.  Hurts when she lays on that side.  Intermittent groin pain.    I have reviewed the following portions of the patient's history and agree with: History of Present Illness and Review of Systems    Patient Active Problem List   Diagnosis    Coronary artery disease of native artery of native heart with stable angina pectoris    Hypertension    Heart attack    Class 3 obesity with alveolar hypoventilation and body mass index (BMI) of 40.0 to 44.9 in adult    8 mm right middle lobe nodule and 4 mm left lower lobe nodule noncalcified (3/2023)    NSTEMI, initial episode of care    Shortness of breath    Pleuritic chest pain    Type 2 diabetes mellitus with diabetic autonomic neuropathy, with long-term current use of insulin    Moderate episode of recurrent major depressive disorder    Pericarditis    Dependence on continuous supplemental oxygen    Hyperlipidemia LDL goal <70     Past Medical History:   Diagnosis Date    Abnormal ECG May 2 2023    Ankle sprain 07    Arthritis of back 97    Asthma March 2 2023    Bursitis of hip 96    Cervical disc disorder 96    CHF (congestive heart failure) May 4 2023    Congenital heart disease March 2 2023    Coronary artery disease March 2 2023    CTS (carpal tunnel syndrome) 05    Diabetes mellitus     Heart attack 03/02/2023    Hip arthrosis 97    Hyperlipidemia     Hypertension     Knee swelling 96     Low back strain 95    Right middle lobe pulmonary nodule 2023    3/2/23: CT of chest FINDINGS: There is an 8 mm nodule in the right middle lobe. There is a 5  mm subpleural nodule in the left lower lobe. Mild diffuse interstitial  changes are present. The ascending aorta is ectatic. There is no  pericardial or pleural effusion. There is no adenopathy. There is no  dissection. There is no PE. The spleen is mildly enlarged at 15 cm. -Referral to lung nodule clini    Sleep apnea 2017    Type 2 diabetes mellitus 2016      Past Surgical History:   Procedure Laterality Date    ADENOIDECTOMY      ANKLE OPEN REDUCTION INTERNAL FIXATION      ARTERIAL BYPASS SURGERY      CARDIAC CATHETERIZATION N/A 2023    Procedure: Left Heart Cath;  Surgeon: Korey Rene MD;  Location: [facility] CATH INVASIVE LOCATION;  Service: Cardiovascular;  Laterality: N/A;    CARDIAC SURGERY  2023     SECTION      CORONARY ARTERY BYPASS GRAFT  2023    CORONARY STENT PLACEMENT  May 2 2023    KNEE SURGERY  89    TONSILLECTOMY        Family History   Problem Relation Age of Onset    Heart attack Mother     Asthma Mother     Heart attack Father     Heart disease Father     Hypertension Father     Diabetes Father     Obesity Father     Breast cancer Sister     Asthma Sister     Cancer Sister     Kidney disease Sister     Heart attack Maternal Grandmother     Heart attack Paternal Grandmother     Lung cancer Paternal Aunt     Heart attack Paternal Grandfather     Ovarian cancer Neg Hx      Social History     Socioeconomic History    Marital status:    Tobacco Use    Smoking status: Never    Smokeless tobacco: Never   Vaping Use    Vaping status: Never Used   Substance and Sexual Activity    Alcohol use: Never    Drug use: Never    Sexual activity: Not Currently     Partners: Male     Birth control/protection: Post-menopausal      Current Outpatient Medications on File Prior to Visit   Medication Sig  Dispense Refill    acetaminophen (TYLENOL) 500 MG tablet Take 2 tablets by mouth 3 (Three) Times a Day As Needed for Moderate Pain. (Patient taking differently: Take 2 tablets by mouth 3 (Three) Times a Day As Needed for Moderate Pain. PRN ONLY) 30 tablet 0    albuterol sulfate  (90 Base) MCG/ACT inhaler Inhale 2 puffs Every 4 (Four) Hours As Needed for Wheezing. 18 g 0    aspirin 81 MG EC tablet Take 1 tablet by mouth Daily.      atorvastatin (LIPITOR) 80 MG tablet Take 1 tablet by mouth Daily. 90 tablet 3    B-D ULTRAFINE III SHORT PEN 31G X 8 MM misc Use to inject insulin twice daily 200 each 0    Blood Pressure Monitoring (Blood Pressure Cuff) misc Use 1 Units 2 (Two) Times a Day. 1 each 0    clobetasol (TEMOVATE) 0.05 % cream Apply 1 application  topically to the appropriate area as directed 2 (Two) Times a Day. 60 g 0    Continuous Blood Gluc  (Dexcom G6 ) device USE AS DIRECTED 1 each 0    Continuous Glucose Sensor (Dexcom G6 Sensor) APPLY  AS DIRECTED 3 each 0    Continuous Glucose Transmitter (Dexcom G6 Transmitter) misc USE 1  AS DIRECTED 1 each 0    Diclofenac Sodium (VOLTAREN) 1 % gel gel Apply 4 g topically to the appropriate area as directed As Needed. PRN ONLY      dilTIAZem CD (CARDIZEM CD) 120 MG 24 hr capsule TAKE 1 CAPSULE BY MOUTH DAILY 90 capsule 1    doxycycline (VIBRAMYCIN) 100 MG capsule Take 1 capsule by mouth 2 (Two) Times a Day. 20 capsule 0    empagliflozin (Jardiance) 25 MG tablet tablet Take 1 tablet by mouth once daily 90 tablet 1    fenofibrate (TRICOR) 145 MG tablet Take 1 tablet by mouth once daily 90 tablet 0    glucagon (GLUCAGEN) 1 MG injection Inject 1 mg under the skin into the appropriate area as directed 1 (One) Time As Needed (hypoglycemia). 1 each 1    hydrOXYzine (ATARAX) 10 MG tablet 1-2 tablets by mouth every 8 hours as needed for anxiety 90 tablet 1    Semaglutide, 2 MG/DOSE, (Ozempic, 2 MG/DOSE,) 8 MG/3ML solution pen-injector Inject 2 mg under  the skin into the appropriate area as directed Every 7 (Seven) Days. 9 mL 0    venlafaxine XR (EFFEXOR-XR) 75 MG 24 hr capsule Take 1 capsule by mouth once daily 90 capsule 0    Vraylar 1.5 MG capsule capsule Take 1 capsule by mouth once daily 90 capsule 0     No current facility-administered medications on file prior to visit.      Allergies   Allergen Reactions    Lisinopril Unknown - High Severity    Benazepril-Hydrochlorothiazide Cough     Other reaction(s): Cough    Latex Hives        Review of Systems   Constitutional:  Negative for activity change, appetite change, chills, diaphoresis, fatigue, fever and unexpected weight change.   HENT:  Negative for congestion, dental problem, drooling, ear discharge, ear pain, facial swelling, hearing loss, mouth sores, nosebleeds, postnasal drip, rhinorrhea, sinus pressure, sneezing, sore throat, tinnitus, trouble swallowing and voice change.    Eyes:  Negative for photophobia, pain, discharge, redness, itching and visual disturbance.   Respiratory:  Negative for apnea, cough, choking, chest tightness, shortness of breath, wheezing and stridor.    Cardiovascular:  Negative for chest pain, palpitations and leg swelling.   Gastrointestinal:  Negative for abdominal distention, abdominal pain, anal bleeding, blood in stool, constipation, diarrhea, nausea, rectal pain and vomiting.   Endocrine: Negative for cold intolerance, heat intolerance, polydipsia, polyphagia and polyuria.   Genitourinary:  Negative for decreased urine volume, difficulty urinating, dysuria, enuresis, flank pain, frequency, genital sores, hematuria and urgency.   Musculoskeletal:  Positive for arthralgias. Negative for back pain, gait problem, joint swelling, myalgias, neck pain and neck stiffness.   Skin:  Negative for color change, pallor, rash and wound.   Allergic/Immunologic: Negative for environmental allergies, food allergies and immunocompromised state.   Neurological:  Negative for dizziness,  "tremors, seizures, syncope, facial asymmetry, speech difficulty, weakness, light-headedness, numbness and headaches.   Hematological:  Negative for adenopathy. Does not bruise/bleed easily.   Psychiatric/Behavioral:  Negative for agitation, behavioral problems, confusion, decreased concentration, dysphoric mood, hallucinations, self-injury, sleep disturbance and suicidal ideas. The patient is not nervous/anxious and is not hyperactive.         Objective      Physical Exam  /82   Ht 177.8 cm (70\")   Wt 129 kg (284 lb)   BMI 40.75 kg/m²     Body mass index is 40.75 kg/m².           General:   Mental Status:  Alert   Appearance: Cooperative, in no acute distress   Build and Nutrition: Obese by BMI female   Orientation: Alert and oriented to person, place and time   Posture: Normal   Gait: Nonantalgic    Integument:   Right hip: No skin lesions, no rash, no ecchymosis    Neurologic:   Sensation:    Right foot: Decreased sensation in the foot secondary to neuropathy   Motor:  Right lower extremity: 5/5 quadriceps, hamstrings, ankle dorsiflexors, and ankle plantar flexors    Vascular:   Right lower extremity: 1+ dorsalis pedis pulse, prompt capillary refill    Lower Extremities:   Right Hip:    Tenderness:  Lateral tenderness    Swelling: None    Crepitus:  None    Atrophy:  None    Range of motion:  External Rotation: 40°, no pain       Internal Rotation: 40°, no pain       Flexion:  100°       Extension:  0°   Instability:  None  Deformities:  None  Functional testing: Negative Stinchfield    No leg length discrepancy      Imaging/Studies      Imaging Results (Last 24 Hours)       Procedure Component Value Units Date/Time    XR Hip With or Without Pelvis 2 - 3 View Right [[id number]] Resulted: 01/27/25 1449     Updated: 01/27/25 1449    Narrative:      Right Hip Radiographs  Indication: right hip pain  Views: low AP pelvis and lateral of the right hip    Comparison: 10/18/2024    Findings:   Joint space " narrowing medially, osteophytes at the head neck junction,   acetabular osteophytes, no acute bony abnormalities.  No unusual bony   features.  Hip arthritis.              Assessment and Plan     Diagnoses and all orders for this visit:    1. Primary osteoarthritis of right hip (Primary)  -     XR Hip With or Without Pelvis 2 - 3 View Right  -     Ambulatory Referral to Physical Therapy for Evaluation & Treatment    2. Class 3 severe obesity without serious comorbidity with body mass index (BMI) of 40.0 to 44.9 in adult, unspecified obesity type        1. Primary osteoarthritis of right hip    2. Class 3 severe obesity without serious comorbidity with body mass index (BMI) of 40.0 to 44.9 in adult, unspecified obesity type          I reviewed my findings with the patient.  She has bursitis and tendinitis in her hip, and her pain is laterally based.  Although she has arthritis in her hip, I do not believe that is her main pain generator.  We will try a course of physical therapy, and I will see her back in 2 months to ensure improvement.  Injections could be considered in the future if appropriate.  She does have diabetes, and I would like to avoid steroid injections if possible.  She has lost weight with combination of medication and dietary modifications.    Return in about 2 months (around 3/27/2025).      Korey Ortiz MD  01/27/25  14:57 EST      Dictated Utilizing Dragon Dictation

## 2025-01-28 ENCOUNTER — PRIOR AUTHORIZATION (OUTPATIENT)
Dept: ENDOCRINOLOGY | Facility: CLINIC | Age: 60
End: 2025-01-28

## 2025-01-28 ENCOUNTER — OFFICE VISIT (OUTPATIENT)
Dept: ENDOCRINOLOGY | Facility: CLINIC | Age: 60
End: 2025-01-28
Payer: MEDICAID

## 2025-01-28 VITALS
HEART RATE: 68 BPM | WEIGHT: 290.4 LBS | HEIGHT: 70 IN | DIASTOLIC BLOOD PRESSURE: 84 MMHG | OXYGEN SATURATION: 96 % | SYSTOLIC BLOOD PRESSURE: 128 MMHG | BODY MASS INDEX: 41.57 KG/M2

## 2025-01-28 DIAGNOSIS — Z79.4 TYPE 2 DIABETES MELLITUS WITH HYPERGLYCEMIA, WITH LONG-TERM CURRENT USE OF INSULIN: Primary | ICD-10-CM

## 2025-01-28 DIAGNOSIS — E78.5 HYPERLIPIDEMIA LDL GOAL <70: ICD-10-CM

## 2025-01-28 DIAGNOSIS — E11.65 TYPE 2 DIABETES MELLITUS WITH HYPERGLYCEMIA, WITH LONG-TERM CURRENT USE OF INSULIN: Primary | ICD-10-CM

## 2025-01-28 LAB
ALBUMIN SERPL-MCNC: 4.1 G/DL (ref 3.5–5.2)
ALBUMIN UR-MCNC: 1.3 MG/DL
ALBUMIN/GLOB SERPL: 1.5 G/DL
ALP SERPL-CCNC: 141 U/L (ref 39–117)
ALT SERPL W P-5'-P-CCNC: 24 U/L (ref 1–33)
ANION GAP SERPL CALCULATED.3IONS-SCNC: 12.2 MMOL/L (ref 5–15)
AST SERPL-CCNC: 18 U/L (ref 1–32)
BILIRUB SERPL-MCNC: 0.7 MG/DL (ref 0–1.2)
BUN SERPL-MCNC: 15 MG/DL (ref 6–20)
BUN/CREAT SERPL: 20.3 (ref 7–25)
CALCIUM SPEC-SCNC: 9.4 MG/DL (ref 8.6–10.5)
CHLORIDE SERPL-SCNC: 103 MMOL/L (ref 98–107)
CHOLEST SERPL-MCNC: 156 MG/DL (ref 0–200)
CO2 SERPL-SCNC: 23.8 MMOL/L (ref 22–29)
CREAT SERPL-MCNC: 0.74 MG/DL (ref 0.57–1)
CREAT UR-MCNC: 145.5 MG/DL
EGFRCR SERPLBLD CKD-EPI 2021: 94.5 ML/MIN/1.73
EXPIRATION DATE: ABNORMAL
EXPIRATION DATE: ABNORMAL
GLOBULIN UR ELPH-MCNC: 2.8 GM/DL
GLUCOSE BLDC GLUCOMTR-MCNC: 152 MG/DL (ref 70–130)
GLUCOSE SERPL-MCNC: 143 MG/DL (ref 65–99)
HBA1C MFR BLD: 6.4 % (ref 4.5–5.7)
HDLC SERPL-MCNC: 39 MG/DL (ref 40–60)
LDLC SERPL CALC-MCNC: 75 MG/DL (ref 0–100)
LDLC/HDLC SERPL: 1.67 {RATIO}
Lab: ABNORMAL
Lab: ABNORMAL
MICROALBUMIN/CREAT UR: 8.9 MG/G (ref 0–29)
POTASSIUM SERPL-SCNC: 4.3 MMOL/L (ref 3.5–5.2)
PROT SERPL-MCNC: 6.9 G/DL (ref 6–8.5)
SODIUM SERPL-SCNC: 139 MMOL/L (ref 136–145)
TRIGL SERPL-MCNC: 259 MG/DL (ref 0–150)
TSH SERPL DL<=0.05 MIU/L-ACNC: 1.8 UIU/ML (ref 0.27–4.2)
VLDLC SERPL-MCNC: 42 MG/DL (ref 5–40)

## 2025-01-28 PROCEDURE — 80061 LIPID PANEL: CPT | Performed by: INTERNAL MEDICINE

## 2025-01-28 PROCEDURE — 80053 COMPREHEN METABOLIC PANEL: CPT | Performed by: INTERNAL MEDICINE

## 2025-01-28 PROCEDURE — 82043 UR ALBUMIN QUANTITATIVE: CPT | Performed by: INTERNAL MEDICINE

## 2025-01-28 PROCEDURE — 82570 ASSAY OF URINE CREATININE: CPT | Performed by: INTERNAL MEDICINE

## 2025-01-28 PROCEDURE — 84443 ASSAY THYROID STIM HORMONE: CPT | Performed by: INTERNAL MEDICINE

## 2025-01-28 RX ORDER — ACYCLOVIR 400 MG/1
1 TABLET ORAL DAILY
Qty: 1 EACH | Refills: 0 | Status: SHIPPED | OUTPATIENT
Start: 2025-01-28 | End: 2025-02-03 | Stop reason: SDUPTHER

## 2025-01-28 RX ORDER — ACYCLOVIR 400 MG/1
1 TABLET ORAL
Qty: 9 EACH | Refills: 3 | Status: SHIPPED | OUTPATIENT
Start: 2025-01-28 | End: 2025-02-03 | Stop reason: SDUPTHER

## 2025-01-28 RX ORDER — LANCETS 30 GAUGE
1 EACH MISCELLANEOUS 2 TIMES DAILY
Qty: 200 EACH | Refills: 3 | Status: SHIPPED | OUTPATIENT
Start: 2025-01-28

## 2025-01-28 RX ORDER — BLOOD-GLUCOSE METER
1 KIT MISCELLANEOUS 2 TIMES DAILY
Qty: 1 EACH | Refills: 0 | Status: SHIPPED | OUTPATIENT
Start: 2025-01-28

## 2025-01-28 NOTE — PROGRESS NOTES
"Chief Complaint   Patient presents with    Diabetes     Type II          HPI   Estefani Blancas is a 59 y.o. female had concerns including Diabetes (Type II).    She is checking blood sugars 4+ times per day with dexcom CGM. Data reviewed from the last two weeks shows average glucose 170 with variability of 22.6%. In target range 653%, high 31%, very high 4 %, low 0%, very low less than 1%.   Pattern of: Occasional postprandial hyperglycemia    Current medications for diabetes include Jardiance 25 mg daily, Ozempic 2 mg weekly.  Has been out of jardiance for a month.     Weight is trending up. Hasn't been on trulicity in the past. Would like more weight loss. Willing to try mounjaro.     Low-dose mixed insulin was stopped after her last visit in May 2024.  She was also on glipizide twice daily but was discontinued.    A1c 6.4 today.    The following portions of the patient's history were reviewed and updated as appropriate: allergies, current medications, past family history, past medical history, past social history, past surgical history, and problem list.      Review of Systems   Constitutional:  Positive for unexpected weight gain.   Endocrine:        See HPI        Physical Exam  Vitals reviewed.   Constitutional:       Appearance: Normal appearance. She is obese.      Comments: Body mass index is 41.67 kg/m².     Cardiovascular:      Rate and Rhythm: Normal rate.   Pulmonary:      Effort: Pulmonary effort is normal.   Neurological:      General: No focal deficit present.      Mental Status: She is alert. Mental status is at baseline.   Psychiatric:         Mood and Affect: Mood normal.         Behavior: Behavior normal.        /84 (BP Location: Left arm, Patient Position: Sitting, Cuff Size: Adult)   Pulse 68   Ht 177.8 cm (70\")   Wt 132 kg (290 lb 6.4 oz)   SpO2 96%   BMI 41.67 kg/m²      Labs and imaging    A1C:  Lab Results   Component Value Date    HGBA1C 6.4 (A) 01/28/2025    HGBA1C 5.8 (A) " 10/18/2024      Glucose:  Lab Results   Component Value Date    POCGLU 152 (A) 01/28/2025      CMP:  Lab Results   Component Value Date    GLUCOSE 199 (H) 07/31/2023    BUN 19 07/31/2023    CREATININE 0.87 07/31/2023     07/31/2023    K 3.8 07/31/2023     07/31/2023    CALCIUM 9.1 07/31/2023    PROTEINTOT 7.5 07/31/2023    ALBUMIN 4.1 07/31/2023    ALT 16 07/31/2023    AST 16 07/31/2023    ALKPHOS 80 07/31/2023    BILITOT 0.5 07/31/2023    GLOB 3.4 07/31/2023    AGRATIO 1.2 07/31/2023    BCR 21.8 07/31/2023    ANIONGAP 19.0 (H) 07/31/2023    EGFR 78.8 07/31/2023      Lipid Panel:  Lab Results   Component Value Date    CHOL 154 05/03/2023    TRIG 258 (H) 05/03/2023    HDL 34 (L) 05/03/2023    LDL 78 05/03/2023      TSH:  Lab Results   Component Value Date    TSH 1.020 03/06/2023          Assessment and plan  Diagnoses and all orders for this visit:    1. Type 2 diabetes mellitus with hyperglycemia, with long-term current use of insulin (Primary)  Uncontrolled with postprandial hyperglycemia.  A1c up to 6.4. Complicated by neuropathy.    She was taken off metformin during CABG.  Consider resuming in the future.  No longer requiring insulin or glipizide.  Continue Ozempic 2 mg weekly to mounjaro 5 mg weekly. Titrate up as tolerated. She hasn't been on trulicity in the past.  Continue Jardiance 25 mg daily.  Continue CGM and upgrade to 7 if insurance will continue to cover.  Labs due.  Monofilament updated 9/2024.  Ophtho exam up-to-date from 12/16/2024, no retinopathy.  -     POC Glucose, Blood  -     POC Glycosylated Hemoglobin (Hb A1C)  -     empagliflozin (Jardiance) 25 MG tablet tablet; Take 1 tablet by mouth Daily.  Dispense: 90 tablet; Refill: 1  -     Tirzepatide 5 MG/0.5ML solution auto-injector; Inject 5 mg under the skin into the appropriate area as directed Every 7 (Seven) Days.  Dispense: 2 mL; Refill: 1  -     Comprehensive Metabolic Panel  -     Microalbumin / Creatinine Urine Ratio - Urine,  Clean Catch  -     TSH  -     Continuous Glucose Sensor (Dexcom G7 Sensor) misc; Use 1 each Every 10 (Ten) Days.  Dispense: 9 each; Refill: 3  -     Continuous Glucose  (Dexcom G7 ) device; Use 1 each Daily.  Dispense: 1 each; Refill: 0  -     glucose monitor monitoring kit; Use 1 each 2 (Two) Times a Day.  Dispense: 1 each; Refill: 0  -     glucose blood test strip; Check glucose 2 times daily  Dispense: 200 each; Refill: 3  -     Lancets misc; Use 1 each 2 (Two) Times a Day.  Dispense: 200 each; Refill: 3    2. Hyperlipidemia LDL goal <70  On atorvastatin 80 mg daily and fenofibrate.  Check fasting lipids today.  -     Lipid Panel        Return in about 4 months (around 5/28/2025) for next scheduled follow up. The patient was instructed to contact the clinic with any interval questions or concerns.    Electronically signed by: Salena Chamberlain DO   Endocrinologist    Please note that portions of this note were completed with a voice recognition program.

## 2025-01-28 NOTE — PATIENT INSTRUCTIONS
Change ozempic to mounjaro. This can be increased monthly as tolerated. Please call/message for dose increases.

## 2025-01-28 NOTE — TELEPHONE ENCOUNTER
Mounjaro denied by insurance.   requires the following rule(s) be met for approval: A. The member has had at least a 3-month trial and failure [drug did not work], allergy, contraindication [harmful for] (including potential drug-drug interactions with other medications) or intolerance [side effect] to two preferred GLP-1 agonists [drug in the same group]: Byetta, Ozempic, Trulicity, Victoza

## 2025-01-29 DIAGNOSIS — E78.1 HYPERTRIGLYCERIDEMIA: Primary | ICD-10-CM

## 2025-01-29 DIAGNOSIS — I25.118 CORONARY ARTERY DISEASE OF NATIVE ARTERY OF NATIVE HEART WITH STABLE ANGINA PECTORIS: ICD-10-CM

## 2025-01-29 DIAGNOSIS — Z79.4 TYPE 2 DIABETES MELLITUS WITH HYPERGLYCEMIA, WITH LONG-TERM CURRENT USE OF INSULIN: Primary | ICD-10-CM

## 2025-01-29 DIAGNOSIS — E11.65 TYPE 2 DIABETES MELLITUS WITH HYPERGLYCEMIA, WITH LONG-TERM CURRENT USE OF INSULIN: Primary | ICD-10-CM

## 2025-01-29 RX ORDER — ICOSAPENT ETHYL 1 G/1
2 CAPSULE ORAL 2 TIMES DAILY WITH MEALS
Qty: 120 CAPSULE | Refills: 5 | Status: SHIPPED | OUTPATIENT
Start: 2025-01-29

## 2025-01-31 ENCOUNTER — TELEPHONE (OUTPATIENT)
Dept: ENDOCRINOLOGY | Facility: CLINIC | Age: 60
End: 2025-01-31

## 2025-01-31 DIAGNOSIS — F33.1 MODERATE EPISODE OF RECURRENT MAJOR DEPRESSIVE DISORDER: ICD-10-CM

## 2025-01-31 RX ORDER — VENLAFAXINE HYDROCHLORIDE 75 MG/1
75 CAPSULE, EXTENDED RELEASE ORAL DAILY
Qty: 90 CAPSULE | Refills: 0 | Status: SHIPPED | OUTPATIENT
Start: 2025-01-31

## 2025-01-31 NOTE — TELEPHONE ENCOUNTER
Caller: Edward Blancastim Hernandez    Relationship: Self    Best call back number: [phone]     Requested Prescriptions:   Requested Prescriptions     Pending Prescriptions Disp Refills    venlafaxine XR (EFFEXOR-XR) 75 MG 24 hr capsule 90 capsule 0     Sig: Take 1 capsule by mouth Daily.        Pharmacy where request should be sent: [facility] [address] [place], [place] [phone] [place] [phone] FX     Last office visit with prescribing clinician: 12/19/2024   Last telemedicine visit with prescribing clinician: Visit date not found   Next office visit with prescribing clinician: 3/25/2025     Additional details provided by patient: PATIENT HAS CALLED REQUESTING A REFILL ON ABOVE MEDICATION IF PCP IS WANTING HER TO CONTINUE TAKING UNTIL HER NEXT APPOINTMENT.     Does the patient have less than a 3 day supply:  [x] Yes  [] No    Would you like a call back once the refill request has been completed: [] Yes [x] No    If the office needs to give you a call back, can they leave a voicemail: [] Yes [x] No    Scotty Hammonds   01/31/25 10:04 EST

## 2025-01-31 NOTE — TELEPHONE ENCOUNTER
Patient states her insurance has denied the dexcom g7 and that she was informed by Dr. Chamberlain that if insurance did deny this, she would prescribe an insulin pen and a dexcom g7 monitor where she no longer on her long acting insulin. Patient wanted to see if Dr. Chamberlain still wanted to continue with this or if she needed to continue with sticking her finger.

## 2025-02-03 DIAGNOSIS — Z79.4 TYPE 2 DIABETES MELLITUS WITH HYPERGLYCEMIA, WITH LONG-TERM CURRENT USE OF INSULIN: Primary | ICD-10-CM

## 2025-02-03 DIAGNOSIS — E11.65 TYPE 2 DIABETES MELLITUS WITH HYPERGLYCEMIA, WITH LONG-TERM CURRENT USE OF INSULIN: Primary | ICD-10-CM

## 2025-02-03 RX ORDER — PEN NEEDLE, DIABETIC 31 GX5/16"
NEEDLE, DISPOSABLE MISCELLANEOUS
Qty: 200 EACH | Refills: 1 | Status: SHIPPED | OUTPATIENT
Start: 2025-02-03

## 2025-02-03 RX ORDER — ACYCLOVIR 400 MG/1
1 TABLET ORAL DAILY
Qty: 1 EACH | Refills: 0 | Status: SHIPPED | OUTPATIENT
Start: 2025-02-03

## 2025-02-03 RX ORDER — ACYCLOVIR 400 MG/1
1 TABLET ORAL
Qty: 9 EACH | Refills: 3 | Status: SHIPPED | OUTPATIENT
Start: 2025-02-03

## 2025-02-03 RX ORDER — INSULIN ASPART 100 [IU]/ML
INJECTION, SOLUTION INTRAVENOUS; SUBCUTANEOUS
Qty: 15 ML | Refills: 1 | Status: SHIPPED | OUTPATIENT
Start: 2025-02-03

## 2025-03-01 DIAGNOSIS — F33.1 MODERATE EPISODE OF RECURRENT MAJOR DEPRESSIVE DISORDER: ICD-10-CM

## 2025-03-03 RX ORDER — CARIPRAZINE 1.5 MG/1
1.5 CAPSULE, GELATIN COATED ORAL DAILY
Qty: 90 CAPSULE | Refills: 0 | Status: SHIPPED | OUTPATIENT
Start: 2025-03-03

## 2025-03-30 DIAGNOSIS — Z79.4 TYPE 2 DIABETES MELLITUS WITH HYPERGLYCEMIA, WITH LONG-TERM CURRENT USE OF INSULIN: ICD-10-CM

## 2025-03-30 DIAGNOSIS — E11.65 TYPE 2 DIABETES MELLITUS WITH HYPERGLYCEMIA, WITH LONG-TERM CURRENT USE OF INSULIN: ICD-10-CM

## 2025-03-31 RX ORDER — DULAGLUTIDE 3 MG/.5ML
INJECTION, SOLUTION SUBCUTANEOUS
Qty: 6 ML | Refills: 0 | Status: SHIPPED | OUTPATIENT
Start: 2025-03-31

## 2025-03-31 NOTE — TELEPHONE ENCOUNTER
Rx Refill Note  Requested Prescriptions     Pending Prescriptions Disp Refills    Trulicity 3 MG/0.5ML solution auto-injector [Pharmacy Med Name: Trulicity 3 MG/0.5ML Subcutaneous Solution Pen-injector] 4 mL 0     Sig: INJECT 3 MG UNDER THE SKIN INTO THE APPROPRIATE AREA AS DIRECTED EVERY 7 DAYS          Last office visit with prescribing clinician: 1/28/2025     Next office visit with prescribing clinician: 5/28/2025         Asha Ramos MA  03/31/25, 14:55 EDT

## 2025-04-08 DIAGNOSIS — E11.65 TYPE 2 DIABETES MELLITUS WITH HYPERGLYCEMIA, WITH LONG-TERM CURRENT USE OF INSULIN: ICD-10-CM

## 2025-04-08 DIAGNOSIS — Z79.4 TYPE 2 DIABETES MELLITUS WITH HYPERGLYCEMIA, WITH LONG-TERM CURRENT USE OF INSULIN: ICD-10-CM

## 2025-04-08 RX ORDER — INSULIN ASPART 100 [IU]/ML
INJECTION, SOLUTION INTRAVENOUS; SUBCUTANEOUS
Qty: 15 ML | Refills: 1 | Status: SHIPPED | OUTPATIENT
Start: 2025-04-08

## 2025-04-08 NOTE — TELEPHONE ENCOUNTER
Caller: Estefani Blancas    Relationship: Self    Best call back number:      Requested Prescriptions:   Requested Prescriptions     Pending Prescriptions Disp Refills    insulin aspart (NovoLOG FlexPen) 100 UNIT/ML solution pen-injector sc pen 15 mL 1     Sig: Take every 3-4 hrs as needed for high glucose as directed - 1 unit for every 50 points glucose is > 200, MDD 15 units        Pharmacy where request should be sent: [place] [phone] [place] [phone] FX     Last office visit with prescribing clinician: 12/19/2024   Last telemedicine visit with prescribing clinician: Visit date not found   Next office visit with prescribing clinician: Visit date not found     Additional details provided by patient: PATIENT IS OUT OF MEDICATION    Does the patient have less than a 3 day supply:  [x] Yes  [] No      Yue Elaine Rep   04/08/25 10:57 EDT

## 2025-05-12 ENCOUNTER — TELEPHONE (OUTPATIENT)
Dept: INTERNAL MEDICINE | Facility: CLINIC | Age: 60
End: 2025-05-12
Payer: COMMERCIAL

## 2025-05-13 DIAGNOSIS — E11.65 TYPE 2 DIABETES MELLITUS WITH HYPERGLYCEMIA, WITH LONG-TERM CURRENT USE OF INSULIN: ICD-10-CM

## 2025-05-13 DIAGNOSIS — Z79.4 TYPE 2 DIABETES MELLITUS WITH HYPERGLYCEMIA, WITH LONG-TERM CURRENT USE OF INSULIN: ICD-10-CM

## 2025-05-13 RX ORDER — PEN NEEDLE, DIABETIC 31 GX5/16"
NEEDLE, DISPOSABLE MISCELLANEOUS
Qty: 200 EACH | Refills: 0 | Status: SHIPPED | OUTPATIENT
Start: 2025-05-13

## 2025-05-13 NOTE — TELEPHONE ENCOUNTER
----- Message from Amy DUNN sent at 5/9/2025  1:50 PM EDT -----  Attempted to contact patient 3 times. Ok to cancel?  ----- Message -----  From: SYSTEM  Sent: 1/30/2025   1:18 AM EDT  To: Doc Torres [facility]

## 2025-06-05 ENCOUNTER — TELEPHONE (OUTPATIENT)
Dept: INTERNAL MEDICINE | Facility: CLINIC | Age: 60
End: 2025-06-05

## 2025-06-05 NOTE — TELEPHONE ENCOUNTER
Patient states she does not get a medical card (medicaid) or food stamps anymore. Both her and her  are on disability. They do have insurance but the copay is $25 a visit and their funds are running low. So she has not been able to make it in for an appointment. She has one scheduled for 06/27/25. She is wanting to know if you will refill her meds to get her through to her appointment.   She also has some forms for financial help with prescriptions. She is wondering if you would fill those out for her if she brings them in when she gets them?    I advised patient we would first need to see forms before deciding if its something we can fill out. And we may need to wait until her appointment to fill them out.   Please advise

## 2025-06-05 NOTE — TELEPHONE ENCOUNTER
PATIENT HAS CALLED REQUESTING A CALL BACK FROM THE NURSE IN REGARDS TO SOME PAPERWORK THAT IS GOING TO BE COMING IN.     CALL BACK NUMBER IS [phone]

## 2025-06-05 NOTE — TELEPHONE ENCOUNTER
I left a message on the patients voicemail to call our office back, office number provided.     HUB relay:    Need more information..    PATIENT HAS CALLED REQUESTING A CALL BACK FROM THE NURSE IN REGARDS TO SOME PAPERWORK THAT IS GOING TO BE COMING IN.

## 2025-06-05 NOTE — TELEPHONE ENCOUNTER
Patient states she is only taking Glipazide, jardiance, vraylar, and Diltiazem.   The only med she has enough of to get her to her appointment is the Jardiance.  She is also asking for a refill of Venlafaxine because she ran out and has not been taking it and she is crying all the time.     She is asking for these refills:  Glipazide  Vraylar  Diltiazem  Venlafaxine     That should get her until her appointment June 27th

## 2025-06-06 DIAGNOSIS — I10 PRIMARY HYPERTENSION: ICD-10-CM

## 2025-06-06 DIAGNOSIS — Z79.4 TYPE 2 DIABETES MELLITUS WITH DIABETIC AUTONOMIC NEUROPATHY, WITH LONG-TERM CURRENT USE OF INSULIN: Primary | ICD-10-CM

## 2025-06-06 DIAGNOSIS — E11.43 TYPE 2 DIABETES MELLITUS WITH DIABETIC AUTONOMIC NEUROPATHY, WITH LONG-TERM CURRENT USE OF INSULIN: Primary | ICD-10-CM

## 2025-06-06 DIAGNOSIS — F33.1 MODERATE EPISODE OF RECURRENT MAJOR DEPRESSIVE DISORDER: ICD-10-CM

## 2025-06-06 RX ORDER — DILTIAZEM HYDROCHLORIDE 120 MG/1
120 CAPSULE, COATED, EXTENDED RELEASE ORAL DAILY
Qty: 90 CAPSULE | Refills: 1 | Status: SHIPPED | OUTPATIENT
Start: 2025-06-06

## 2025-06-06 RX ORDER — VENLAFAXINE HYDROCHLORIDE 75 MG/1
75 CAPSULE, EXTENDED RELEASE ORAL DAILY
Qty: 90 CAPSULE | Refills: 0 | Status: SHIPPED | OUTPATIENT
Start: 2025-06-06

## 2025-06-06 RX ORDER — GLIPIZIDE 10 MG/1
10 TABLET ORAL
Qty: 60 TABLET | Refills: 0 | Status: SHIPPED | OUTPATIENT
Start: 2025-06-06

## 2025-06-06 NOTE — TELEPHONE ENCOUNTER
She states she used to take glipizide and she had some left over so she has just been taking that to help control her glucose for now.   Glipizide 10mg BID before meals

## 2025-08-15 ENCOUNTER — TELEPHONE (OUTPATIENT)
Dept: ADMINISTRATIVE | Facility: OTHER | Age: 60
End: 2025-08-15

## 2025-08-15 ENCOUNTER — TELEPHONE (OUTPATIENT)
Dept: INTERNAL MEDICINE | Facility: CLINIC | Age: 60
End: 2025-08-15

## (undated) DEVICE — CATH DIAG EXPO M/ PK 5F FL4/FR4 PIG

## (undated) DEVICE — MINI TREK CORONARY DILATATION CATHETER 2.0 MM X 15 MM / RAPID-EXCHANGE: Brand: MINI TREK

## (undated) DEVICE — MODEL BT2000 P/N 700287-012KIT CONTENTS: MANIFOLD WITH SALINE AND CONTRAST PORTS, SALINE TUBING WITH SPIKE AND HAND SYRINGE, TRANSDUCER: Brand: BT2000 AUTOMATED MANIFOLD KIT

## (undated) DEVICE — MODEL AT P65, P/N 701554-001KIT CONTENTS: HAND CONTROLLER, 3-WAY HIGH-PRESSURE STOPCOCK WITH ROTATING END AND PREMIUM HIGH-PRESSURE TUBING: Brand: ANGIOTOUCH® KIT

## (undated) DEVICE — NC TREK CORONARY DILATATION CATHETER 2.5 MM X 8 MM / RAPID-EXCHANGE: Brand: NC TREK

## (undated) DEVICE — GLIDESHEATH SLENDER STAINLESS STEEL KIT: Brand: GLIDESHEATH SLENDER

## (undated) DEVICE — TREK CORONARY DILATATION CATHETER 2.50 MM X 12 MM / RAPID-EXCHANGE: Brand: TREK

## (undated) DEVICE — GUIDE CATHETER: Brand: MACH1™

## (undated) DEVICE — DEV COMPR RADL PRELUDESYNCEZ 30ML 32CM

## (undated) DEVICE — ADULT, W/LG. BACK PAD, RADIOTRANSPARENT ELEMENT AND LEAD WIRE: Brand: DEFIBRILLATION ELECTRODES

## (undated) DEVICE — GW PERIPH GUIDERIGHT STD/EXCHNG/J/TIP SS 0.035IN 5X260CM

## (undated) DEVICE — COPILOT BLEEDBACK CONTROL VALVE: Brand: COPILOT

## (undated) DEVICE — MINI TREK CORONARY DILATATION CATHETER 2.0 MM X 12 MM / RAPID-EXCHANGE: Brand: MINI TREK

## (undated) DEVICE — Device: Brand: ASAHI SION BLUE

## (undated) DEVICE — GW PRESSUREWIRE X WIRELESS FFR 175CM

## (undated) DEVICE — DEV INFL MONARCH 25W

## (undated) DEVICE — CATH DIAG IMPULSE IMT 5F 100CM

## (undated) DEVICE — GUIDELINER CATHETERS ARE INTENDED TO BE USED IN CONJUNCTION WITH GUIDE CATHETERS TO ACCESS DISCRETE REGIONS OF THE CORONARY AND/OR PERIPHERAL VASCULATURE, AND TO FACILITATE PLACEMENT OF INTERVENTIONAL DEVICES.: Brand: GUIDELINER® V3 CATHETER

## (undated) DEVICE — CATH DIAG EXPO .045 AL1 5F 100CM

## (undated) DEVICE — KT MANIFOLD CATHLAB CUST

## (undated) DEVICE — PK CATH CARD 10

## (undated) DEVICE — ST INF PRI SMRTSTE 20DRP 2VLV 24ML 117

## (undated) DEVICE — GW PT 2 MS .014 185CM STR TP

## (undated) DEVICE — CVR PROB ULTRASND/TRANSD W/GEL 7X11IN STRL